# Patient Record
Sex: MALE | Race: WHITE | Employment: OTHER | URBAN - METROPOLITAN AREA
[De-identification: names, ages, dates, MRNs, and addresses within clinical notes are randomized per-mention and may not be internally consistent; named-entity substitution may affect disease eponyms.]

---

## 2019-04-19 ENCOUNTER — HOSPITAL ENCOUNTER (EMERGENCY)
Age: 79
Discharge: HOME | End: 2019-04-19
Attending: EMERGENCY MEDICINE
Payer: MEDICARE

## 2019-04-19 ENCOUNTER — APPOINTMENT (OUTPATIENT)
Dept: CT IMAGING | Age: 79
End: 2019-04-19
Attending: EMERGENCY MEDICINE
Payer: MEDICARE

## 2019-04-19 VITALS
HEART RATE: 80 BPM | RESPIRATION RATE: 18 BRPM | WEIGHT: 145 LBS | DIASTOLIC BLOOD PRESSURE: 63 MMHG | HEIGHT: 66 IN | BODY MASS INDEX: 23.3 KG/M2 | OXYGEN SATURATION: 99 % | SYSTOLIC BLOOD PRESSURE: 126 MMHG | TEMPERATURE: 97.3 F

## 2019-04-19 DIAGNOSIS — M48.061 SPINAL STENOSIS OF LUMBAR REGION WITHOUT NEUROGENIC CLAUDICATION: Primary | ICD-10-CM

## 2019-04-19 DIAGNOSIS — M47.816 SPONDYLOSIS OF LUMBAR REGION WITHOUT MYELOPATHY OR RADICULOPATHY: ICD-10-CM

## 2019-04-19 LAB
ANION GAP BLD CALC-SCNC: 17 MMOL/L (ref 10–20)
APPEARANCE UR: CLEAR
ATRIAL RATE: 52 BPM
BACTERIA URNS QL MICRO: NEGATIVE /HPF
BILIRUB UR QL: NEGATIVE
BUN BLD-MCNC: 18 MG/DL (ref 9–20)
CA-I BLD-MCNC: 1.18 MMOL/L (ref 1.12–1.32)
CALCULATED P AXIS, ECG09: 59 DEGREES
CALCULATED R AXIS, ECG10: 21 DEGREES
CALCULATED T AXIS, ECG11: 48 DEGREES
CHLORIDE BLD-SCNC: 101 MMOL/L (ref 98–107)
CO2 BLD-SCNC: 25 MMOL/L (ref 21–32)
COLOR UR: NORMAL
CREAT BLD-MCNC: 1 MG/DL (ref 0.6–1.3)
DIAGNOSIS, 93000: NORMAL
EPITH CASTS URNS QL MICRO: NORMAL /LPF
GLUCOSE BLD-MCNC: 167 MG/DL (ref 65–100)
GLUCOSE UR STRIP.AUTO-MCNC: NEGATIVE MG/DL
HCT VFR BLD CALC: 43 % (ref 36.6–50.3)
HGB UR QL STRIP: NEGATIVE
KETONES UR QL STRIP.AUTO: NEGATIVE MG/DL
LEUKOCYTE ESTERASE UR QL STRIP.AUTO: NEGATIVE
NITRITE UR QL STRIP.AUTO: NEGATIVE
P-R INTERVAL, ECG05: 182 MS
PH UR STRIP: 6 [PH] (ref 5–8)
POTASSIUM BLD-SCNC: 4 MMOL/L (ref 3.5–5.1)
PROT UR STRIP-MCNC: NEGATIVE MG/DL
Q-T INTERVAL, ECG07: 422 MS
QRS DURATION, ECG06: 102 MS
QTC CALCULATION (BEZET), ECG08: 392 MS
RBC #/AREA URNS HPF: NORMAL /HPF (ref 0–5)
SERVICE CMNT-IMP: ABNORMAL
SODIUM BLD-SCNC: 138 MMOL/L (ref 136–145)
SP GR UR REFRACTOMETRY: 1.02 (ref 1–1.03)
UR CULT HOLD, URHOLD: NORMAL
UROBILINOGEN UR QL STRIP.AUTO: 1 EU/DL (ref 0.2–1)
VENTRICULAR RATE, ECG03: 52 BPM
WBC URNS QL MICRO: NORMAL /HPF (ref 0–4)

## 2019-04-19 PROCEDURE — 74011250636 HC RX REV CODE- 250/636: Performed by: EMERGENCY MEDICINE

## 2019-04-19 PROCEDURE — 96372 THER/PROPH/DIAG INJ SC/IM: CPT

## 2019-04-19 PROCEDURE — 81001 URINALYSIS AUTO W/SCOPE: CPT

## 2019-04-19 PROCEDURE — 74011250637 HC RX REV CODE- 250/637: Performed by: EMERGENCY MEDICINE

## 2019-04-19 PROCEDURE — 80047 BASIC METABLC PNL IONIZED CA: CPT

## 2019-04-19 PROCEDURE — 99284 EMERGENCY DEPT VISIT MOD MDM: CPT

## 2019-04-19 PROCEDURE — 72131 CT LUMBAR SPINE W/O DYE: CPT

## 2019-04-19 PROCEDURE — 51798 US URINE CAPACITY MEASURE: CPT

## 2019-04-19 PROCEDURE — 93005 ELECTROCARDIOGRAM TRACING: CPT

## 2019-04-19 RX ORDER — HYDROCODONE BITARTRATE AND ACETAMINOPHEN 5; 325 MG/1; MG/1
1 TABLET ORAL
Status: COMPLETED | OUTPATIENT
Start: 2019-04-19 | End: 2019-04-19

## 2019-04-19 RX ORDER — DEXAMETHASONE SODIUM PHOSPHATE 4 MG/ML
10 INJECTION, SOLUTION INTRA-ARTICULAR; INTRALESIONAL; INTRAMUSCULAR; INTRAVENOUS; SOFT TISSUE
Status: COMPLETED | OUTPATIENT
Start: 2019-04-19 | End: 2019-04-19

## 2019-04-19 RX ORDER — METHYLPREDNISOLONE 4 MG/1
TABLET ORAL
Qty: 1 DOSE PACK | Refills: 0 | Status: SHIPPED | OUTPATIENT
Start: 2019-04-19 | End: 2019-10-08

## 2019-04-19 RX ORDER — HYDROCODONE BITARTRATE AND ACETAMINOPHEN 5; 325 MG/1; MG/1
1 TABLET ORAL
Qty: 6 TAB | Refills: 0 | Status: SHIPPED | OUTPATIENT
Start: 2019-04-19 | End: 2019-04-22

## 2019-04-19 RX ORDER — NALOXONE HYDROCHLORIDE 4 MG/.1ML
SPRAY NASAL
Qty: 2 EACH | Refills: 0 | Status: SHIPPED | OUTPATIENT
Start: 2019-04-19 | End: 2019-10-08

## 2019-04-19 RX ADMIN — HYDROCODONE BITARTRATE AND ACETAMINOPHEN 1 TABLET: 5; 325 TABLET ORAL at 07:47

## 2019-04-19 RX ADMIN — DEXAMETHASONE SODIUM PHOSPHATE 10 MG: 4 INJECTION, SOLUTION INTRAMUSCULAR; INTRAVENOUS at 07:46

## 2019-04-19 NOTE — ED PROVIDER NOTES
69 y.o. male with past medical history significant for GERD, TIA, Anxiety, and Hypertension who presents from home with chief complaint of back pain. Patient states onset 3-4 days ago of bilateral lower back pain (R>L) that is constant in nature. Patient prior to arrival the back pain was so intense that he became lightheaded and \"almost passed out. \" Patient presents to [facility] ED today with persisting lower back pain (R>L) rated as 10/10 in severity. Patient reports aggravation of back pain with positional movement and palpation. Patient reports accompanying increased urinary frequency recently. Patient endorses taking 5 mg Hydrocodone for back pain with no relief, last dose 0300. Patient reports history of lower back pain which required cortisone shots done by Dr. Russ Miller; however, patient states back pain presented today is different, noting the pain is more severe and \"higher and more to the right. \" Patient states he \"thinks\" he has history of spinal stenosis. Patient denies incontinence. Patient denies recent GLF, injury, or increased daily exercise. Patient denies history of diabetes. Pt denies fever, chills, cough, congestion, shortness of breath, chest pain, abdominal pain, nausea, vomiting, diarrhea, difficulty with urination or dysuria. There are no other acute medical concerns at this time. PCP: Edgar Sauceda MD 
 
Note written by Azam Nicole, as dictated by Abiola Romero MD 7:18 AM 
 
 
The history is provided by the patient and the spouse. Past Medical History:  
Diagnosis Date  Anxiety  GERD (gastroesophageal reflux disease)  HTN (hypertension), benign  TIA (transient ischemic attack) 2000 Past Surgical History:  
Procedure Laterality Date [address]  HX CORONARY ARTERY BYPASS GRAFT Family History:  
Problem Relation Age of Onset  Heart Disease Neg Hx  Stroke Neg Hx Social History Socioeconomic History  Marital status:  Spouse name: Not on file  Number of children: Not on file  Years of education: Not on file  Highest education level: Not on file Occupational History  Not on file Social Needs  Financial resource strain: Not on file  Food insecurity:  
  Worry: Not on file Inability: Not on file  Transportation needs:  
  Medical: Not on file Non-medical: Not on file Tobacco Use  Smoking status: Former Smoker Substance and Sexual Activity  Alcohol use: No  
 Drug use: No  
 Sexual activity: Not on file Lifestyle  Physical activity:  
  Days per week: Not on file Minutes per session: Not on file  Stress: Not on file Relationships  Social connections:  
  Talks on phone: Not on file Gets together: Not on file Attends [employer] service: Not on file Active member of club or organization: Not on file Attends meetings of clubs or organizations: Not on file Relationship status: Not on file  Intimate partner violence:  
  Fear of current or ex partner: Not on file Emotionally abused: Not on file Physically abused: Not on file Forced sexual activity: Not on file Other Topics Concern  Not on file Social History Narrative  Not on file ALLERGIES: Iodinated contrast- oral and iv dye Review of Systems Constitutional: Negative for chills and fever. HENT: Negative for congestion. Respiratory: Negative for cough and shortness of breath. Cardiovascular: Negative for chest pain. Gastrointestinal: Negative for abdominal pain, diarrhea, nausea and vomiting. Genitourinary: Positive for frequency. Negative for difficulty urinating and dysuria. Musculoskeletal: Positive for back pain and gait problem. Neurological: Positive for light-headedness. All other systems reviewed and are negative. Vitals:  
 04/19/19 6252 BP: 126/63 Pulse: 80 Resp: 18  
 Temp: 97.3 °F (36.3 °C) SpO2: 99% Weight: 65.8 kg (145 lb) Height: 5' 6\" (1.676 m) Physical Exam  
Constitutional: He is oriented to person, place, and time. He appears well-developed and well-nourished. HENT:  
Head: Normocephalic and atraumatic. Eyes: Conjunctivae are normal.  
Neck: Normal range of motion. Cardiovascular: Normal rate, regular rhythm, normal heart sounds and intact distal pulses. Pulmonary/Chest: Effort normal and breath sounds normal. No stridor. No respiratory distress. He has no wheezes. He has no rales. Abdominal: Soft. Bowel sounds are normal. He exhibits no distension and no mass. There is no tenderness. There is no rebound and no guarding. Musculoskeletal: Normal range of motion. + midline L spine ttp, no t spine ttp or step off; + ttp right SI joint; 5/5 strength b/l LE; negative slr bilaterally Neurological: He is alert and oriented to person, place, and time. Skin: Skin is warm and dry. Nursing note and vitals reviewed. MDM Number of Diagnoses or Management Options Spinal stenosis of lumbar region without neurogenic claudication:  
Spondylosis of lumbar region without myelopathy or radiculopathy:  
Diagnosis management comments: Sx sound like djd but give age eval for compression fx Doubt urinary in nature but will check urine Try steroids No sx AAA Amount and/or Complexity of Data Reviewed Clinical lab tests: ordered and reviewed Tests in the radiology section of CPT®: ordered and reviewed Obtain history from someone other than the patient: yes (wife) Review and summarize past medical records: yes Patient Progress Patient progress: stable Procedures PROGRESS NOTE: 
8:39 AM Patient road tested and is ambulating ok, but feels lightheaded like he could \"black out\", and is unsure if this is caused by pain. Will check EKG, hemoglobin, and electrolytes. ED EKG interpretation: Rhythm: sinus bradycardia; and regular . Rate (approx.): 50; Axis: normal; P wave: normal; QRS interval: normal ; ST/T wave: non-specific changes EKG documented by Lynda Key MD, scribe, as interpreted by Eduard Michelle MD, ED MD. 
 
PROGRESS NOTE: 
9:06 AM Patient states his back feels better and is no longer feeling dizzy after sitting in the bed. Labs and EKG normal, suspect dizziness was from pain. Provider discussed risks of narcotics and GLF with patient and wife, and reviewed signs of spinal cord compression as reason to return, will follow up with Dr. Dandre Cassidy outpatient. Ambulated with walker with no difficulty PROGRESS NOTE: 
9:11 AM Patient states he cannot stop taking his prescribed Xanax (3x daily). Provider told the patient to not take the pain medication with Xanax due to suppression and drive to breath and is not recommended. Provider suggested Tylenol during the day and taking pain medication at night if he gets sore after ambulating. Provider reviewed how to use Narcan with wife.

## 2019-04-19 NOTE — DISCHARGE INSTRUCTIONS
Patient Education        Lumbar Spinal Stenosis: Care Instructions  Your Care Instructions    Stenosis in the spine is a narrowing of the canal that is around the spinal cord and nerve roots in your back. It can happen as part of aging. Sometimes bone and other tissue grow into this canal and press on the nerves that branch out from the spinal cord. This can cause pain, numbness, and weakness. When it happens in the lower part of your back, it is called lumbar spinal stenosis. It can cause problems in the legs, feet, and rear end (buttocks). You may be able to get relief from the symptoms of spinal stenosis by taking pain medicine. Your doctor may suggest physical therapy and exercises to keep your spine strong and flexible. Some people try steroid shots to reduce swelling. If pain and numbness in your legs are still so bad that you cannot do your normal activities, you may need surgery. Follow-up care is a key part of your treatment and safety. Be sure to make and go to all appointments, and call your doctor if you are having problems. It's also a good idea to know your test results and keep a list of the medicines you take. How can you care for yourself at home? · Take an over-the-counter pain medicine. Nonsteroidal anti-inflammatory drugs (NSAIDs) such as ibuprofen or naproxen seem to work best. But if you can't take NSAIDs, you can try acetaminophen. Be safe with medicines. Read and follow all instructions on the label. · Do not take two or more pain medicines at the same time unless the doctor told you to. Many pain medicines have acetaminophen, which is Tylenol. Too much acetaminophen (Tylenol) can be harmful. · Stay at a healthy weight. Being overweight puts extra strain on your spine. · Change positions often when you sit or stand. This can ease pain. It may also reduce pressure on the spinal cord and its nerves. · Avoid doing things that make your symptoms worse.  Walking downhill and standing for a long time may cause pain. · Stretch and strengthen your back muscles as your doctor or physical therapist recommends. If your doctor says it is okay to do them, these exercises may help. ? Lie on your back with your knees bent. Gently pull one bent knee to your chest. Put that foot back on the floor, and then pull the other knee to your chest.  ? Do pelvic tilts. Lie on your back with your knees bent. Tighten your stomach muscles. Pull your belly button (navel) in and up toward your ribs. You should feel like your back is pressing to the floor and your hips and pelvis are slightly lifting off the floor. Hold for 6 seconds while breathing smoothly. ? Stand with your back flat against a wall. Slowly slide down until your knees are slightly bent. Hold for 10 seconds, then slide back up the wall. · Remove or change anything in your house that may cause you to fall. Keep walkways clear of clutter, electrical cords, and throw rugs. When should you call for help? Call 911 anytime you think you may need emergency care. For example, call if:    · You are unable to move a leg at all.   [facility] your doctor now or seek immediate medical care if:    · You have new or worse symptoms in your legs, belly, or buttocks. Symptoms may include:  ? Numbness or tingling. ? Weakness. ? Pain.     · You lose bladder or bowel control.    Watch closely for changes in your health, and be sure to contact your doctor if:    · You have a fever, lose weight, or don't feel well.     · You are not getting better as expected. Where can you learn more? Go to http://ana-rayne.info/. Lali Chávez in the search box to learn more about \"Lumbar Spinal Stenosis: Care Instructions. \"  Current as of: September 20, 2018  Content Version: 11.9  © 7252-9045 [employer], [employer]. Care instructions adapted under license by [employer] (which disclaims liability or warranty for this information).  If you have questions about a medical condition or this instruction, always ask your healthcare professional. [place] [address] any warranty or liability for your use of this information.

## 2019-04-19 NOTE — ED TRIAGE NOTES
Pt to room for triage, pt unable to get out car, assisted to wheelchair and escorted inside. Pt c/o R sided lower back pain that is radiating up spine, pain ongoing for 3-4 days. States pain is worse laying down night. Denies recent falls/ injurys, CP or SOB. Also c/o n and lightheadedness

## 2019-10-01 ENCOUNTER — HOSPITAL ENCOUNTER (OUTPATIENT)
Dept: MRI IMAGING | Age: 79
Discharge: HOME | End: 2019-10-01
Attending: ORTHOPAEDIC SURGERY
Payer: MEDICARE

## 2019-10-01 DIAGNOSIS — M75.41 IMPINGEMENT SYNDROME OF RIGHT SHOULDER: ICD-10-CM

## 2019-10-01 PROCEDURE — 73221 MRI JOINT UPR EXTREM W/O DYE: CPT

## 2019-10-08 ENCOUNTER — HOSPITAL ENCOUNTER (OUTPATIENT)
Dept: PREADMISSION TESTING | Age: 79
Discharge: HOME | End: 2019-10-08
Payer: MEDICARE

## 2019-10-08 VITALS
DIASTOLIC BLOOD PRESSURE: 72 MMHG | WEIGHT: 150 LBS | HEART RATE: 54 BPM | BODY MASS INDEX: 23.54 KG/M2 | HEIGHT: 67 IN | TEMPERATURE: 97.7 F | SYSTOLIC BLOOD PRESSURE: 125 MMHG

## 2019-10-08 LAB
ATRIAL RATE: 51 BPM
CALCULATED P AXIS, ECG09: 73 DEGREES
CALCULATED R AXIS, ECG10: 18 DEGREES
CALCULATED T AXIS, ECG11: 30 DEGREES
DIAGNOSIS, 93000: NORMAL
HGB BLD-MCNC: 14.8 G/DL (ref 12.1–17)
P-R INTERVAL, ECG05: 176 MS
Q-T INTERVAL, ECG07: 432 MS
QRS DURATION, ECG06: 90 MS
QTC CALCULATION (BEZET), ECG08: 398 MS
VENTRICULAR RATE, ECG03: 51 BPM

## 2019-10-08 PROCEDURE — 36415 COLL VENOUS BLD VENIPUNCTURE: CPT

## 2019-10-08 PROCEDURE — 93005 ELECTROCARDIOGRAM TRACING: CPT

## 2019-10-08 PROCEDURE — 85018 HEMOGLOBIN: CPT

## 2019-10-08 RX ORDER — TAMSULOSIN HYDROCHLORIDE 0.4 MG/1
0.4 CAPSULE ORAL
COMMUNITY

## 2019-10-08 RX ORDER — OMEPRAZOLE 40 MG/1
40 CAPSULE, DELAYED RELEASE ORAL
COMMUNITY

## 2019-10-08 RX ORDER — AMLODIPINE BESYLATE 2.5 MG/1
2.5 TABLET ORAL 2 TIMES DAILY
Status: ON HOLD | COMMUNITY
End: 2022-09-04 | Stop reason: SDUPTHER

## 2019-10-08 RX ORDER — TESTOSTERONE 25 MG/2.5G
25 GEL TRANSDERMAL
Status: ON HOLD | COMMUNITY
End: 2020-01-21

## 2019-10-08 RX ORDER — TRAZODONE HYDROCHLORIDE 100 MG/1
50 TABLET ORAL
COMMUNITY
End: 2020-08-05

## 2019-10-11 NOTE — H&P
Office Visit     10/3/2019  [facility]   Traci Padron MD   Orthopedic Surgery   Nontraumatic incomplete tear of right rotator cuff +2 more   Dx   Right Shoulder - Follow-up   Reason for Visit    Progress notes        PCP: Marcos Huff DO      Problem List      None        Subjective: There is no problem list on file for this patient.     Chief Complaint: Follow-up of the Right Shoulder     HPI: Hollie Cardenas is a 67 y.o. male who returns for follow-up of his right shoulder pain. During his last visit with our office on 9/26/19, he reported continued right shoulder pain. He had dramatic weakness with abduction on strength testing and significant pain. My suspicion was high for a rotator cuff tear. We ordered an MRI of his right shoulder to evaluate for a full-thickness rotator cuff tear. The MRI ordered of his right shoulder showed AC joint degenerative changes with subacromial spurring and a moderate sized high grade partial thickness tear of the supraspinatus. He returns today to discuss the results. He reports his right shoulder pain has increased since his last visit. He notes he is unable to sleep due to the pain. He states his right shoulder ROM is limited due to the pain. He reports trying tylenol for the night pain with very mild relief.       Objective:      There were no vitals filed for this visit. Wt Readings from Last 3 Encounters:   09/26/19 145 lb   05/07/19 153 lb   04/22/19 155 lb      There is no height or weight on file to calculate BMI.     Musculoskeletal : Right Shoulder: He has good range of motion of the neck. No obvious atrophy.  He has dramatically decreased abduction with with significant pain on abduction.  He has dramatic weakness with abduction on strength testing.  Normal IR and ER of the right shoulder with pain on motion.  Negative Spurling sign.  Skin healthy and in tact.  Neurovascularly in tact.            Radiographs:             Mri Shoulder Right Without Contrast ([id number])     Result Date: 10/1/2019  [facility] - [facility] - MRI SHOULDER RT WO CONT Patient: Blaine Hopkins : 1940 Date of Service: 10/1/2019 1:08:26 PM Reason For Exam: Impingement syndrome of right shoulder Ordering Provider: Niharika Juan Physician: Reyes Mobley Signing Date: 10/1/2019 3:21:45 PM EXAM: MRI SHOULDER RT WO CONT INDICATION: Right shoulder pain, impingement COMPARISON: None TECHNIQUE: Axial proton density fat-saturated; oblique coronal T1, T2 fat-saturated, and proton density fat-saturated; and oblique sagittal T2 fat-saturated MRI of the right shoulder . CONTRAST: None. FINDINGS: A.C. joint: There is mild to moderate degeneration of the acromioclavicular joint with subacromial spurring Anterior acromion process type: 2 Bone marrow: Within normal limits. No acute fracture, dislocation, or marrow replacing process. Joint fluid: There is fluid in the subacromial subdeltoid bursa and a small glenohumeral effusion Rotator cuff tendons: There is a moderate-sized high-grade partial-thickness bursal surface tear of the mid and anterior supraspinatus. There is tendinopathy of the remaining supraspinatus. Remaining cuff tendons are intact Biceps tendon: Intact and located within the bicipital groove. Muscles: No edema or atrophy. Glenoid labrum: Intact. Glenohumeral joint capsule: within normal limits. Glenohumeral articular cartilage: Intact. No focal osteochondral lesion. Soft tissue mass: None. IMPRESSION: 1. Moderate-sized high-grade partial-thickness bursal surface tear of the mid and anterior supraspinatus 2. Acromioclavicular degenerative change with subacromial spurring  Signing date/time: 10/1/2019  3:21 PM Signed by: Navin Elizabeth V        Assessment:      1. Nontraumatic incomplete tear of right rotator cuff    2. Impingement syndrome of right shoulder       Plan:      I reviewed the MRI ordered of the right shoulder with the patient.  He has AC joint degenerative changes with subacromial spurring and a moderate sized high grade partial thickness tear of the supraspinatus. I explained to the patient his tear is likely to become a full-thickness tear if left untreated. We discussed treatment options including conservative management vs rotator cuff repair surgery. The patient reports severe right shoulder pain that keeps him awake at night and limits his right shoulder ROM. I think it is reasonable to proceed with surgery. We discussed arthroscopic subacromial chondroplasty followed by mini open rotator cuff repair surgery at length with pt including expected outcomes and post-op recovery as well as risks and complications, specifically DVT, PE, infection, failure of repair, recurrent shoulder dislocations, and nerve injury. After much discussion, pt desires to proceed with surgery. We will schedule surgery at pt's convenience. Due to the severity of his pain today, we provided him with a sling shot shoulder immobilizer which he will wear PRN until surgery. He will bring this to surgery and use this sling post-operatively. Follow-up for scheduled surgery.     No orders of the defined types were placed in this encounter. Return for Scheduled surgery.      Medical documentation was entered by me, [address] [place] [address] for Dr. Poppy Ceballos. 10/3/2019     I, Audrey Paez MD, personally, performed the services described in this documentation, as scribed in my presence, and it is both accurate and complete. Electronically signed by Audrey Paez MD at 10/6/2019 11:47 AM    Revision History                                 Instructions         Return for Scheduled surgery.     After Visit Summary (Automatic SnapShot taken 10/6/2019)   Additional Documentation     SmartForms:     OV REVIEW OF SYSTEMS       Encounter Info:    Billing Info,    History,    Allergies,    Detailed Report       Communications         Letter sent to Tyson Sharma DO   Sent 10/6/2019 Communication Routing History     Recipient Method Sent by Date Sent   Yan Owens DO Fax Damian Petersen MD 10/6/2019   Fax: [phone]  Phone: [phone] Letter from Damian Petersen MD created on 10/6/2019  Reason: Letter to PCP    Recipient     Recipient Method Sent by Date Sent   Yan Owens DO Fax Damian Petersen MD 10/6/2019   Fax: [phone]  Phone: [phone]    Orders Placed      Sling with or without Abduction Pillow ([id number])   Daily Orders   Comment  Expand  Hide   (720h ago, onward)      Last edited by  on  at    Start   Ordered   10/03/19 0000  Sling with or without Abduction Pillow ([id number])      10/03/19 1520      Medication Changes           (Therapy completed)      Medication List    Visit Diagnoses         Nontraumatic incomplete tear of right rotator cuff      Impingement syndrome of right shoulder      Pain management      Problem List    Generated on 10/11/19  3:38 PM Page      Date of Surgery Update:  Rafa Kramer was seen and examined. Past Medical History:   Diagnosis Date    Anxiety     Arthritis     BPH (benign prostatic hyperplasia)     Chronic pain     GERD (gastroesophageal reflux disease)     HTN (hypertension), benign     Rotator cuff tear     RIGHT     TIA (transient ischemic attack) 2000     Prior to Admission Medications   Prescriptions Last Dose Informant Patient Reported? Taking? ALPRAZolam (XANAX) 0.25 mg tablet 10/15/2019 at 0500  Yes Yes   Sig: Take 0.25 mg by mouth two (2) times a day. amLODIPine (NORVASC) 5 mg tablet 10/15/2019 at 0500  Yes Yes   Sig: Take 5 mg by mouth daily (after breakfast). aspirin 81 mg chewable tablet 10/12/2019  Yes No   Sig: Take 81 mg by mouth daily. atorvastatin (LIPITOR) 40 mg tablet 10/14/2019 at Unknown time  Yes Yes   Sig: Take 40 mg by mouth nightly. naproxen sodium (ALEVE PO) 10/11/2019  Yes No   Sig: Take  by mouth two (2) times daily as needed.    omeprazole (PRILOSEC) 40 mg capsule 10/14/2019 at Unknown time  Yes Yes   Sig: Take 40 mg by mouth daily (after breakfast). tamsulosin (FLOMAX) 0.4 mg capsule 10/14/2019 at Unknown time  Yes Yes   Sig: Take 0.4 mg by mouth nightly. testosterone (ANDROGEL) 1 % (25 mg/2.5gram) glpk 10/5/2019  Yes No   Si mg by TransDERmal route. Indications: EVERY 10-15 DAYS   traZODone (DESYREL) 100 mg tablet 10/14/2019 at Unknown time  Yes Yes   Sig: Take 100 mg by mouth nightly. Facility-Administered Medications: None      Allergy to: Iodinated contrast media  Physical Examination: General appearance - alert, well appearing, and in no distress  Mental status - alert, oriented to person, place, and time  Chest - clear to auscultation, no wheezes, rales or rhonchi, symmetric air entry  Heart - normal rate and regular rhythm  Neurological - no focal findings or movement disorder noted  Extremities - intact peripheral pulses, painful/limited ROM right shoulder  Skin - normal coloration and turgor, no rashes, no suspicious skin lesions noted  History and physical has been reviewed. The patient has been examined.  There have been no significant clinical changes since the completion of the originally dated History and Physical.    Signed By: VALARIE Martinez     October 15, 2019 9:13 AM

## 2019-10-14 ENCOUNTER — ANESTHESIA EVENT (OUTPATIENT)
Dept: MEDSURG UNIT | Age: 79
End: 2019-10-14
Payer: MEDICARE

## 2019-10-15 ENCOUNTER — ANESTHESIA (OUTPATIENT)
Dept: MEDSURG UNIT | Age: 79
End: 2019-10-15
Payer: MEDICARE

## 2019-10-15 ENCOUNTER — HOSPITAL ENCOUNTER (OUTPATIENT)
Age: 79
Setting detail: OUTPATIENT SURGERY
Discharge: HOME | End: 2019-10-15
Attending: ORTHOPAEDIC SURGERY | Admitting: ORTHOPAEDIC SURGERY
Payer: MEDICARE

## 2019-10-15 VITALS
OXYGEN SATURATION: 95 % | DIASTOLIC BLOOD PRESSURE: 45 MMHG | SYSTOLIC BLOOD PRESSURE: 104 MMHG | TEMPERATURE: 97.5 F | HEART RATE: 66 BPM | RESPIRATION RATE: 16 BRPM

## 2019-10-15 DIAGNOSIS — M75.121 NONTRAUMATIC COMPLETE TEAR OF RIGHT ROTATOR CUFF: Primary | ICD-10-CM

## 2019-10-15 PROCEDURE — 74011250637 HC RX REV CODE- 250/637: Performed by: ANESTHESIOLOGY

## 2019-10-15 PROCEDURE — 77030016678 HC BUR SHV4 S&N -B: Performed by: ORTHOPAEDIC SURGERY

## 2019-10-15 PROCEDURE — 77030006884 HC BLD SHV INCIS S&N -B: Performed by: ORTHOPAEDIC SURGERY

## 2019-10-15 PROCEDURE — 74011000250 HC RX REV CODE- 250: Performed by: ANESTHESIOLOGY

## 2019-10-15 PROCEDURE — 74011250636 HC RX REV CODE- 250/636: Performed by: NURSE ANESTHETIST, CERTIFIED REGISTERED

## 2019-10-15 PROCEDURE — 77030026438 HC STYL ET INTUB CARD -A: Performed by: NURSE ANESTHETIST, CERTIFIED REGISTERED

## 2019-10-15 PROCEDURE — 74011000250 HC RX REV CODE- 250: Performed by: NURSE ANESTHETIST, CERTIFIED REGISTERED

## 2019-10-15 PROCEDURE — 77030002933 HC SUT MCRYL J&J -A: Performed by: ORTHOPAEDIC SURGERY

## 2019-10-15 PROCEDURE — 77030040361 HC SLV COMPR DVT MDII -B: Performed by: ORTHOPAEDIC SURGERY

## 2019-10-15 PROCEDURE — 77030003601 HC NDL NRV BLK BBMI -A

## 2019-10-15 PROCEDURE — 74011250636 HC RX REV CODE- 250/636: Performed by: ORTHOPAEDIC SURGERY

## 2019-10-15 PROCEDURE — 77030008684 HC TU ET CUF COVD -B: Performed by: NURSE ANESTHETIST, CERTIFIED REGISTERED

## 2019-10-15 PROCEDURE — 77030039266 HC ADH SKN EXOFIN S2SG -A: Performed by: ORTHOPAEDIC SURGERY

## 2019-10-15 PROCEDURE — 76030000003 HC AMB SURG OR TIME 1.5 TO 2: Performed by: ORTHOPAEDIC SURGERY

## 2019-10-15 PROCEDURE — 77030031139 HC SUT VCRL2 J&J -A: Performed by: ORTHOPAEDIC SURGERY

## 2019-10-15 PROCEDURE — C1713 ANCHOR/SCREW BN/BN,TIS/BN: HCPCS | Performed by: ORTHOPAEDIC SURGERY

## 2019-10-15 PROCEDURE — 77030020346 HC PRB ARTHSC CHSL S&N -C: Performed by: ORTHOPAEDIC SURGERY

## 2019-10-15 PROCEDURE — 76060000063 HC AMB SURG ANES 1.5 TO 2 HR: Performed by: ORTHOPAEDIC SURGERY

## 2019-10-15 PROCEDURE — 76210000037 HC AMBSU PH I REC 2 TO 2.5 HR: Performed by: ORTHOPAEDIC SURGERY

## 2019-10-15 PROCEDURE — 76210000046 HC AMBSU PH II REC FIRST 0.5 HR: Performed by: ORTHOPAEDIC SURGERY

## 2019-10-15 PROCEDURE — 74011250636 HC RX REV CODE- 250/636: Performed by: ANESTHESIOLOGY

## 2019-10-15 PROCEDURE — 77030008753 HC TU IRR IN/OUT FLO S&N -B: Performed by: ORTHOPAEDIC SURGERY

## 2019-10-15 PROCEDURE — 77030002919 HC SUT FBRTAPE ARTH -B: Performed by: ORTHOPAEDIC SURGERY

## 2019-10-15 PROCEDURE — 77030002916 HC SUT ETHLN J&J -A: Performed by: ORTHOPAEDIC SURGERY

## 2019-10-15 PROCEDURE — 77030040922 HC BLNKT HYPOTHRM STRY -A

## 2019-10-15 PROCEDURE — 77030018835 HC SOL IRR LR ICUM -A: Performed by: ORTHOPAEDIC SURGERY

## 2019-10-15 DEVICE — ANCHOR SUTURE BIOCOMP 4.75X19.1 MM SWIVELOCK C: Type: IMPLANTABLE DEVICE | Site: SHOULDER | Status: FUNCTIONAL

## 2019-10-15 RX ORDER — PROPOFOL 10 MG/ML
INJECTION, EMULSION INTRAVENOUS AS NEEDED
Status: DISCONTINUED | OUTPATIENT
Start: 2019-10-15 | End: 2019-10-15 | Stop reason: HOSPADM

## 2019-10-15 RX ORDER — CEFAZOLIN SODIUM 1 G/3ML
INJECTION, POWDER, FOR SOLUTION INTRAMUSCULAR; INTRAVENOUS AS NEEDED
Status: DISCONTINUED | OUTPATIENT
Start: 2019-10-15 | End: 2019-10-15 | Stop reason: HOSPADM

## 2019-10-15 RX ORDER — ROPIVACAINE HYDROCHLORIDE 5 MG/ML
30 INJECTION, SOLUTION EPIDURAL; INFILTRATION; PERINEURAL AS NEEDED
Status: DISCONTINUED | OUTPATIENT
Start: 2019-10-15 | End: 2019-10-15 | Stop reason: HOSPADM

## 2019-10-15 RX ORDER — MORPHINE SULFATE 10 MG/ML
2 INJECTION, SOLUTION INTRAMUSCULAR; INTRAVENOUS
Status: DISCONTINUED | OUTPATIENT
Start: 2019-10-15 | End: 2019-10-15 | Stop reason: HOSPADM

## 2019-10-15 RX ORDER — ONDANSETRON 2 MG/ML
4 INJECTION INTRAMUSCULAR; INTRAVENOUS AS NEEDED
Status: DISCONTINUED | OUTPATIENT
Start: 2019-10-15 | End: 2019-10-15 | Stop reason: HOSPADM

## 2019-10-15 RX ORDER — NEOSTIGMINE METHYLSULFATE 1 MG/ML
INJECTION INTRAVENOUS AS NEEDED
Status: DISCONTINUED | OUTPATIENT
Start: 2019-10-15 | End: 2019-10-15 | Stop reason: HOSPADM

## 2019-10-15 RX ORDER — SODIUM CHLORIDE, SODIUM LACTATE, POTASSIUM CHLORIDE, CALCIUM CHLORIDE 600; 310; 30; 20 MG/100ML; MG/100ML; MG/100ML; MG/100ML
1000 INJECTION, SOLUTION INTRAVENOUS CONTINUOUS
Status: DISCONTINUED | OUTPATIENT
Start: 2019-10-15 | End: 2019-10-15 | Stop reason: HOSPADM

## 2019-10-15 RX ORDER — FENTANYL CITRATE 50 UG/ML
50 INJECTION, SOLUTION INTRAMUSCULAR; INTRAVENOUS AS NEEDED
Status: DISCONTINUED | OUTPATIENT
Start: 2019-10-15 | End: 2019-10-15 | Stop reason: HOSPADM

## 2019-10-15 RX ORDER — GLYCOPYRROLATE 0.2 MG/ML
INJECTION INTRAMUSCULAR; INTRAVENOUS AS NEEDED
Status: DISCONTINUED | OUTPATIENT
Start: 2019-10-15 | End: 2019-10-15 | Stop reason: HOSPADM

## 2019-10-15 RX ORDER — ONDANSETRON 2 MG/ML
INJECTION INTRAMUSCULAR; INTRAVENOUS AS NEEDED
Status: DISCONTINUED | OUTPATIENT
Start: 2019-10-15 | End: 2019-10-15 | Stop reason: HOSPADM

## 2019-10-15 RX ORDER — PHENYLEPHRINE HCL IN 0.9% NACL 0.4MG/10ML
SYRINGE (ML) INTRAVENOUS AS NEEDED
Status: DISCONTINUED | OUTPATIENT
Start: 2019-10-15 | End: 2019-10-15 | Stop reason: HOSPADM

## 2019-10-15 RX ORDER — LIDOCAINE HYDROCHLORIDE 10 MG/ML
0.1 INJECTION, SOLUTION EPIDURAL; INFILTRATION; INTRACAUDAL; PERINEURAL AS NEEDED
Status: DISCONTINUED | OUTPATIENT
Start: 2019-10-15 | End: 2019-10-15 | Stop reason: HOSPADM

## 2019-10-15 RX ORDER — SUCCINYLCHOLINE CHLORIDE 20 MG/ML
INJECTION INTRAMUSCULAR; INTRAVENOUS AS NEEDED
Status: DISCONTINUED | OUTPATIENT
Start: 2019-10-15 | End: 2019-10-15 | Stop reason: HOSPADM

## 2019-10-15 RX ORDER — SODIUM CHLORIDE 9 MG/ML
25 INJECTION, SOLUTION INTRAVENOUS CONTINUOUS
Status: DISCONTINUED | OUTPATIENT
Start: 2019-10-15 | End: 2019-10-15 | Stop reason: HOSPADM

## 2019-10-15 RX ORDER — EPHEDRINE SULFATE/0.9% NACL/PF 50 MG/5 ML
SYRINGE (ML) INTRAVENOUS AS NEEDED
Status: DISCONTINUED | OUTPATIENT
Start: 2019-10-15 | End: 2019-10-15 | Stop reason: HOSPADM

## 2019-10-15 RX ORDER — MIDAZOLAM HYDROCHLORIDE 1 MG/ML
0.5 INJECTION, SOLUTION INTRAMUSCULAR; INTRAVENOUS
Status: DISCONTINUED | OUTPATIENT
Start: 2019-10-15 | End: 2019-10-15 | Stop reason: HOSPADM

## 2019-10-15 RX ORDER — DIPHENHYDRAMINE HYDROCHLORIDE 50 MG/ML
12.5 INJECTION, SOLUTION INTRAMUSCULAR; INTRAVENOUS AS NEEDED
Status: DISCONTINUED | OUTPATIENT
Start: 2019-10-15 | End: 2019-10-15 | Stop reason: HOSPADM

## 2019-10-15 RX ORDER — FENTANYL CITRATE 50 UG/ML
INJECTION, SOLUTION INTRAMUSCULAR; INTRAVENOUS AS NEEDED
Status: DISCONTINUED | OUTPATIENT
Start: 2019-10-15 | End: 2019-10-15 | Stop reason: HOSPADM

## 2019-10-15 RX ORDER — HYDROCODONE BITARTRATE AND ACETAMINOPHEN 5; 325 MG/1; MG/1
1 TABLET ORAL AS NEEDED
Status: DISCONTINUED | OUTPATIENT
Start: 2019-10-15 | End: 2019-10-15 | Stop reason: HOSPADM

## 2019-10-15 RX ORDER — HYDROMORPHONE HYDROCHLORIDE 1 MG/ML
0.2 INJECTION, SOLUTION INTRAMUSCULAR; INTRAVENOUS; SUBCUTANEOUS
Status: DISCONTINUED | OUTPATIENT
Start: 2019-10-15 | End: 2019-10-15 | Stop reason: HOSPADM

## 2019-10-15 RX ORDER — FENTANYL CITRATE 50 UG/ML
25 INJECTION, SOLUTION INTRAMUSCULAR; INTRAVENOUS
Status: DISCONTINUED | OUTPATIENT
Start: 2019-10-15 | End: 2019-10-15 | Stop reason: HOSPADM

## 2019-10-15 RX ORDER — DEXAMETHASONE SODIUM PHOSPHATE 4 MG/ML
INJECTION, SOLUTION INTRA-ARTICULAR; INTRALESIONAL; INTRAMUSCULAR; INTRAVENOUS; SOFT TISSUE AS NEEDED
Status: DISCONTINUED | OUTPATIENT
Start: 2019-10-15 | End: 2019-10-15 | Stop reason: HOSPADM

## 2019-10-15 RX ORDER — ACETAMINOPHEN 325 MG/1
650 TABLET ORAL ONCE
Status: COMPLETED | OUTPATIENT
Start: 2019-10-15 | End: 2019-10-15

## 2019-10-15 RX ORDER — MIDAZOLAM HYDROCHLORIDE 1 MG/ML
1 INJECTION, SOLUTION INTRAMUSCULAR; INTRAVENOUS AS NEEDED
Status: COMPLETED | OUTPATIENT
Start: 2019-10-15 | End: 2019-10-15

## 2019-10-15 RX ORDER — ROPIVACAINE HYDROCHLORIDE 5 MG/ML
INJECTION, SOLUTION EPIDURAL; INFILTRATION; PERINEURAL
Status: COMPLETED | OUTPATIENT
Start: 2019-10-15 | End: 2019-10-15

## 2019-10-15 RX ORDER — LIDOCAINE HYDROCHLORIDE 20 MG/ML
INJECTION, SOLUTION EPIDURAL; INFILTRATION; INTRACAUDAL; PERINEURAL AS NEEDED
Status: DISCONTINUED | OUTPATIENT
Start: 2019-10-15 | End: 2019-10-15 | Stop reason: HOSPADM

## 2019-10-15 RX ORDER — ROCURONIUM BROMIDE 10 MG/ML
INJECTION, SOLUTION INTRAVENOUS AS NEEDED
Status: DISCONTINUED | OUTPATIENT
Start: 2019-10-15 | End: 2019-10-15 | Stop reason: HOSPADM

## 2019-10-15 RX ORDER — MIDAZOLAM HYDROCHLORIDE 1 MG/ML
1 INJECTION, SOLUTION INTRAMUSCULAR; INTRAVENOUS AS NEEDED
Status: DISCONTINUED | OUTPATIENT
Start: 2019-10-15 | End: 2019-10-15 | Stop reason: HOSPADM

## 2019-10-15 RX ORDER — GLYCOPYRROLATE 0.2 MG/ML
0.2 INJECTION INTRAMUSCULAR; INTRAVENOUS
Status: DISCONTINUED | OUTPATIENT
Start: 2019-10-15 | End: 2019-10-15 | Stop reason: HOSPADM

## 2019-10-15 RX ORDER — ALBUTEROL SULFATE 0.83 MG/ML
2.5 SOLUTION RESPIRATORY (INHALATION) AS NEEDED
Status: DISCONTINUED | OUTPATIENT
Start: 2019-10-15 | End: 2019-10-15 | Stop reason: HOSPADM

## 2019-10-15 RX ORDER — OXYCODONE AND ACETAMINOPHEN 5; 325 MG/1; MG/1
1 TABLET ORAL
Qty: 40 TAB | Refills: 0 | Status: SHIPPED | OUTPATIENT
Start: 2019-10-15 | End: 2019-10-29

## 2019-10-15 RX ADMIN — LIDOCAINE HYDROCHLORIDE 80 MG: 20 INJECTION, SOLUTION EPIDURAL; INFILTRATION; INTRACAUDAL; PERINEURAL at 09:44

## 2019-10-15 RX ADMIN — Medication 10 MG: at 10:37

## 2019-10-15 RX ADMIN — ROCURONIUM BROMIDE 20 MG: 10 SOLUTION INTRAVENOUS at 09:56

## 2019-10-15 RX ADMIN — MIDAZOLAM 1 MG: 1 INJECTION INTRAMUSCULAR; INTRAVENOUS at 08:51

## 2019-10-15 RX ADMIN — SODIUM CHLORIDE 2.5 MG: 9 INJECTION INTRAMUSCULAR; INTRAVENOUS; SUBCUTANEOUS at 12:29

## 2019-10-15 RX ADMIN — MIDAZOLAM 1 MG: 1 INJECTION INTRAMUSCULAR; INTRAVENOUS at 08:55

## 2019-10-15 RX ADMIN — Medication 120 MCG: at 09:51

## 2019-10-15 RX ADMIN — Medication 20 MG: at 09:54

## 2019-10-15 RX ADMIN — Medication 80 MCG: at 09:57

## 2019-10-15 RX ADMIN — GLYCOPYRROLATE 0.4 MG: 0.2 INJECTION INTRAMUSCULAR; INTRAVENOUS at 10:49

## 2019-10-15 RX ADMIN — ROPIVACAINE HYDROCHLORIDE 30 ML: 5 INJECTION, SOLUTION EPIDURAL; INFILTRATION; PERINEURAL at 08:57

## 2019-10-15 RX ADMIN — SUCCINYLCHOLINE CHLORIDE 120 MG: 20 INJECTION, SOLUTION INTRAMUSCULAR; INTRAVENOUS at 09:44

## 2019-10-15 RX ADMIN — FENTANYL CITRATE 50 MCG: 50 INJECTION INTRAMUSCULAR; INTRAVENOUS at 08:51

## 2019-10-15 RX ADMIN — FENTANYL CITRATE 25 MCG: 50 INJECTION INTRAMUSCULAR; INTRAVENOUS at 11:56

## 2019-10-15 RX ADMIN — DEXAMETHASONE SODIUM PHOSPHATE 4 MG: 4 INJECTION, SOLUTION INTRAMUSCULAR; INTRAVENOUS at 10:00

## 2019-10-15 RX ADMIN — CEFAZOLIN 2 G: 330 INJECTION, POWDER, FOR SOLUTION INTRAMUSCULAR; INTRAVENOUS at 09:53

## 2019-10-15 RX ADMIN — Medication 10 MG: at 09:57

## 2019-10-15 RX ADMIN — MIDAZOLAM 1 MG: 1 INJECTION INTRAMUSCULAR; INTRAVENOUS at 08:52

## 2019-10-15 RX ADMIN — SODIUM CHLORIDE, SODIUM LACTATE, POTASSIUM CHLORIDE, AND CALCIUM CHLORIDE 1000 ML: 600; 310; 30; 20 INJECTION, SOLUTION INTRAVENOUS at 08:35

## 2019-10-15 RX ADMIN — PROPOFOL 150 MG: 10 INJECTION, EMULSION INTRAVENOUS at 09:44

## 2019-10-15 RX ADMIN — Medication 80 MCG: at 09:52

## 2019-10-15 RX ADMIN — MIDAZOLAM 1 MG: 1 INJECTION INTRAMUSCULAR; INTRAVENOUS at 08:54

## 2019-10-15 RX ADMIN — FENTANYL CITRATE 50 MCG: 50 INJECTION, SOLUTION INTRAMUSCULAR; INTRAVENOUS at 09:44

## 2019-10-15 RX ADMIN — FENTANYL CITRATE 25 MCG: 50 INJECTION INTRAMUSCULAR; INTRAVENOUS at 12:11

## 2019-10-15 RX ADMIN — ONDANSETRON 4 MG: 2 INJECTION INTRAMUSCULAR; INTRAVENOUS at 11:58

## 2019-10-15 RX ADMIN — MIDAZOLAM 1 MG: 1 INJECTION INTRAMUSCULAR; INTRAVENOUS at 08:53

## 2019-10-15 RX ADMIN — NEOSTIGMINE METHYLSULFATE 2 MG: 1 INJECTION, SOLUTION INTRAVENOUS at 10:49

## 2019-10-15 RX ADMIN — ONDANSETRON HYDROCHLORIDE 4 MG: 2 INJECTION, SOLUTION INTRAVENOUS at 10:00

## 2019-10-15 RX ADMIN — ACETAMINOPHEN 650 MG: 325 TABLET, FILM COATED ORAL at 08:27

## 2019-10-15 RX ADMIN — ROCURONIUM BROMIDE 10 MG: 10 SOLUTION INTRAVENOUS at 09:44

## 2019-10-15 NOTE — ANESTHESIA PREPROCEDURE EVALUATION
Relevant Problems   No relevant active problems       Anesthetic History   No history of anesthetic complications            Review of Systems / Medical History  Patient summary reviewed, nursing notes reviewed and pertinent labs reviewed    Pulmonary  Within defined limits                 Neuro/Psych       CVA: no residual symptoms       Cardiovascular    Hypertension          CAD and CABG    Exercise tolerance: >4 METS     GI/Hepatic/Renal     GERD: well controlled           Endo/Other        Arthritis     Other Findings              Physical Exam    Airway  Mallampati: II  TM Distance: > 6 cm  Neck ROM: normal range of motion   Mouth opening: Normal     Cardiovascular  Regular rate and rhythm,  S1 and S2 normal,  no murmur, click, rub, or gallop             Dental    Dentition: Implants     Pulmonary  Breath sounds clear to auscultation               Abdominal  GI exam deferred       Other Findings            Anesthetic Plan    ASA: 3  Anesthesia type: general      Post-op pain plan if not by surgeon: peripheral nerve block single    Induction: Intravenous  Anesthetic plan and risks discussed with: Patient

## 2019-10-15 NOTE — ANESTHESIA POSTPROCEDURE EVALUATION
Post-Anesthesia Evaluation and Assessment    Patient: Viviana Casillas MRN: [id number]  SSN: [id number]    YOB: 1940  Age: 67 y.o. Sex: male      I have evaluated the patient and they are stable and ready for discharge from the PACU. Cardiovascular Function/Vital Signs  Visit Vitals  /53   Pulse 81   Temp 36.4 °C (97.5 °F)   Resp 20   SpO2 100%       Patient is status post General anesthesia for Procedure(s):  SHOULDER LEFT ARTHROSCOPYACROMIOPLASTY, MINI OPEN ROTATOR CUFF TEAR REPAIR (CHOICE). Nausea/Vomiting: None    Postoperative hydration reviewed and adequate. Pain:  Pain Scale 1: Numeric (0 - 10) (10/15/19 0758)  Pain Intensity 1: 3 (10/15/19 0758)   Managed    Neurological Status:   Neuro (WDL): Within Defined Limits (10/15/19 0806)   At baseline    Mental Status, Level of Consciousness: Alert and  oriented to person, place, and time    Pulmonary Status:   O2 Device: CO2 nasal cannula (10/15/19 1122)   Adequate oxygenation and airway patent    Complications related to anesthesia: None    Post-anesthesia assessment completed. No concerns    Signed By: Kan Valle MD     October 15, 2019              Procedure(s):  SHOULDER LEFT ARTHROSCOPYACROMIOPLASTY, MINI OPEN ROTATOR CUFF TEAR REPAIR (CHOICE). general    <BSHSIANPOST>    Vitals Value Taken Time   /53 10/15/2019 12:00 PM   Temp 36.4 °C (97.5 °F) 10/15/2019 11:22 AM   Pulse 70 10/15/2019 12:11 PM   Resp 12 10/15/2019 12:11 PM   SpO2 100 % 10/15/2019 12:11 PM   Vitals shown include unvalidated device data.

## 2019-10-15 NOTE — ROUTINE PROCESS
Patient: Dorine Essex MRN: [id number]  SSN: [id number] YOB: 1940  Age: 67 y.o. Sex: male Patient is status post Procedure(s): SHOULDER LEFT ARTHROSCOPYACROMIOPLASTY, MINI OPEN ROTATOR CUFF TEAR REPAIR (CHOICE). Surgeon(s) and Role: Vasiliy Ballesteros MD - Primary Local/Dose/Irrigation:  SEE MAR Peripheral IV 10/15/19 Left Hand (Active) Site Assessment Clean, dry, & intact 10/15/2019  8:39 AM  
Phlebitis Assessment 0 10/15/2019  8:39 AM  
Infiltration Assessment 0 10/15/2019  8:39 AM  
Dressing Status Clean, dry, & intact 10/15/2019  8:39 AM  
Dressing Type Tape;Transparent 10/15/2019  8:39 AM  
Hub Color/Line Status Blue; Infusing 10/15/2019  8:39 AM  
Alcohol Cap Used Yes 10/15/2019  8:39 AM  
        
Airway - Endotracheal Tube 10/15/19 Oral (Active) Dressing/Packing:  Wound Shoulder Right-Dressing Type: 4 x 4;ABD pad; Topical skin adhesive/glue;Special tape (comment); Other (Comment)(exofin, hypafix tape) (10/15/19 0900) Splint/Cast: Wound Shoulder Right-Splint Type/Material: Shoulder Immobilizer] Other:

## 2019-10-15 NOTE — OP NOTES
[address]  OPERATIVE REPORT    Name:  Jose Martínez  MR#:  [id number]  :  1940  ACCOUNT #:  [de-identified]  DATE OF SERVICE:  10/15/2019    PREOPERATIVE DIAGNOSIS:  Right shoulder rotator cuff tear. POSTOPERATIVE DIAGNOSIS:  Right shoulder rotator cuff tear. PROCEDURES PERFORMED:  Right shoulder arthroscopic acromioplasty, mini-open rotator cuff tear repair. SURGEON:  Vee Calderón MD    ASSISTANT:  VALARIE Hodges    ANESTHESIA:  Regional plus general.    COMPLICATIONS:  None. SPECIMENS REMOVED:  None. IMPLANTS:  Arthrex anchors x2. ESTIMATED BLOOD LOSS:  Minimal.    INDICATIONS:  This is a 78-year-old gentleman with chronic progressive severe right shoulder pain not responsive to conservative management with an MRI scan showing near complete tear of his supraspinatus with only approximately 10% remaining fibers attached, who was felt to be a candidate for surgical intervention due to the severity of his pain and the near complete tear. OPERATION:  The patient was seen in the preoperative area and underwent placement of regional anesthesia. He was taken to the operating room and underwent general inhalational anesthesia. He was placed in the lateral position with the right shoulder up. The right shoulder and arm were prepped and draped in the usual fashion. Ten pounds of longitudinal traction was applied. Posterior portal was established in the glenohumeral joint. The joint was thoroughly evaluated and the biceps tendon was intact. There was some mild fraying proximally but nothing warranting intervention. Labrum was intact. No significant chondromalacia was noted. Severe fraying on the undersurface of the rotator cuff was noted. I was unable to identify a full-thickness tear. I removed all portals and saline solution. Direct lateral portal was established in the subacromial space.   Severe wear on the undersurface of the acromion and coracoacromial ligament was noted. Essentially a full-thickness tear was noted of the attachment of the supraspinatus. The incisor blade was used to debride the avulsion site down to bleeding subchondral bone. There did appear to be a few fibers remaining attached and these were debrided. There was mild retraction. The electrocautery device was used to clear the soft tissue from the undersurface of the acromion and release the coracoacromial ligament. The helical cutter was used to proceed with a stepwise acromioplasty. After an adequate acromioplasty, I then removed all portals and saline solution. Lateral portal was extended for a mini-open approach. Deltoid fibers were split longitudinally. Self-retaining retractor was placed. As I said, there were a few remaining fibers and these were transected using a 15 blade. The rotator cuff tendon was easily mobilized out laterally. A medial anchor was placed with FiberWire tape and FiberWire suture attached. The FiberWire tape and sutures were passed in a sequential fashion through the medial edge of the supraspinatus tendon. FiberWire tape was used to advance the cuff out laterally to a lateral anchor with a nice solid repair. FiberWire sutures were then tied down thereby reestablishing the footprint. All remaining suture was removed. The wounds were extensively irrigated with saline solution. The deltoid fascia was repaired using 0 Vicryl in interrupted figure-of-eight fashion. Skin edges were approximated using 3-0 Monocryl in a running subcuticular fashion followed by Dermabond. Sterile dressing was applied. The patient was awakened, extubated, and transferred to the recovery room in stable condition. The physician assistant was critical throughout the procedure in assisting with retraction, manipulation of the arm, and suture management as well as wound closure.       Floyd Gallo MD      [id number]  D:  10/15/2019 11:01  T: 10/15/2019 15:32  JOB #:  [id number]

## 2019-10-15 NOTE — ANESTHESIA PROCEDURE NOTES
Peripheral Block    Start time: 10/15/2019 8:49 AM  End time: 10/15/2019 8:57 AM  Performed by: Shannan Anguiano MD  Authorized by: Shannan Anguiano MD       Pre-procedure: Indications: procedure for pain    Preanesthetic Checklist: patient identified, risks and benefits discussed, site marked, timeout performed, anesthesia consent given and patient being monitored      Block Type:   Block Type:   Interscalene  Laterality:  Right  Monitoring:  Heart rate, frequent vital sign checks, continuous pulse ox, oxygen, responsive to questions and standard ASA monitoring  Injection Technique:  Single shot  Procedures: nerve stimulator    Patient Position: supine  Location:  Interscalene  Needle Type:  Stimuplex  Needle Gauge:  22 G  Needle Localization:  Nerve stimulator    Assessment:  Number of attempts:  1  Injection Assessment:  Incremental injection every 5 mL, negative aspiration for CSF, no paresthesia, no intravascular symptoms and negative aspiration for blood  Patient tolerance:  Patient tolerated the procedure well with no immediate complications

## 2019-10-15 NOTE — BRIEF OP NOTE
BRIEF OPERATIVE NOTE    Date of Procedure: 10/15/2019   Preoperative Diagnosis: ROTATOR CUFF TEAR OF THE RIGHT SHOULDER  Postoperative Diagnosis: ROTATOR CUFF TEAR OF THE RIGHT SHOULDER    Procedure(s):  SHOULDER LEFT ARTHROSCOPYACROMIOPLASTY, MINI OPEN ROTATOR CUFF TEAR REPAIR (CHOICE)  Surgeon(s) and Role:     * Joe Lopez MD - Primary         Surgical Assistant: Carlene Juares, [facility]    Surgical Staff:  Circ-1: Dez Parada RN  Circ-2: Germania Caldwell RN  Physician Assistant: VALARIE Cortez  Scrub RN-1: [place], [place], RN  Event Time In Time Out   Incision Start 1010    Incision Close       Anesthesia: General   Estimated Blood Loss: minimal  Specimens: * No specimens in log *   Findings: full thickness tear supraspinatous with mild retraction  Complications: none  Implants:   Implant Name Type Inv.  Item Serial No.  Lot No. LRB No. Used Action   ANCHOR BIOCOMP 4.75X19.1MM -- [facility] C-VENT - SN/A [place] ANCHOR BIOCOMP 4.75X19.1MM -- [employer] C-VENT N/A [employer] [id number] Right 2 Implanted

## 2019-10-15 NOTE — DISCHARGE INSTRUCTIONS
[address]    Upper Extremity Surgery  Discharge Instructions  EARLENE Jimenez MD    Please take the time to review the following instructions before you leave the hospital/surgery center and use them as guidelines during your recovery from surgery. If you have any questions, you may contact my office at [phone]. Wound Care/Dressing Changes    You may change your dressing as needed beginning on the 3rd day after surgery. When the dressing is removed, you may wash the area, including the sutures or staples, with soap and water. If the wound is still draining at that point, you may cover the incision with gauze and an ACE wrap. It is not necessary to apply antibiotic ointment to the incision. Sutures or staples will be removed at Dr. Milo Feliz office during your post-operative visit. Showering/Bathing    You may shower only. Your dressing may be removed for showering. You may get your incisions wet in the shower. Do not vigorously scrub the area where your incisions are. Apply a clean, dry dressing after gently drying the area of your incisions. Don't take a tub bath, get into a swimming pool or Jacuzzi until the incisions are completely healed. That is, don't soak your incisions under water. Exercises:    Keep your arm in the sling at all times except when showering and changing your clothes. Keep your arm at your side. Don't move it away from your body. Ice/Elevation:    Continue ice consistently for the next 48 hours as needed for pain and swelling. You should ice your shoulder at least three times each day for one week in cycles of ice on for twenty minutes, ice off for twenty minutes. Repeat for a total of two hours each time. Diet:    You may advance to your regular diet as tolerated. Increase your clear liquid intake for the next 2 to 3 days.     Medication:    You will be given a prescription pain medicine when you are discharged from the hospital/surgery center. Take the medication on an as-needed basis according to the directions on the prescription bottle. Possible side effects of this medication include dizziness, headache, nausea, vomitting, constipation, and urinary retention. If you experience any of these side effects, call the office so that we may assist you in relieving them. Stop the use of pain medications if you develop excessive itching, a rash, shortness of breath, or difficulty swallowing. If these symptoms are severe or are not relieved by stopping the medication, you should seek immediate medical attention. Refills of pain medication are authorized during office hours ONLY (8am-5pm, M-F). You may resume the medications you were taking prior to surgery. Pain medication may potentiate the effects of any anti-depressant medication you are taking. If you have any questions about possible interactions between your regular medications and the pain medication, you should consult your medical doctor who prescribes your regular medications. You may use Ibuprofen 600mg three to four times a day for 2-3 days after surgery. This can be used along with your pain medication. Begin taking one 81mg Aspirin each day for 2 weeks, begin on the day of surgery. Follow-up with Dr. Lennox Cousins in 2 weeks. ______________________________________________________________________    Anesthesia Discharge Instructions    After general anesthesia or intervenous sedation, for 24 hours or while taking prescription Narcotics:  · Limit your activities  · Do not drive or operate hazardous machinery  · If you have not urinated within 8 hours after discharge, please contact your surgeon on call.   · Do not make important personal or business decisions  · Do not drink alcoholic beverages    Report the following to your surgeon:  · Excessive pain, swelling, redness or odor of or around the surgical area  · Temperature over 100.5 degrees  · Nausea and vomiting lasting longer than 4 hours or if unable to take medication  · Any signs of decreased circulation or nerve impairment to extremity:  Change in color, persistent numbness, tingling, coldness or increased pain.   · Any questions

## 2020-01-21 ENCOUNTER — APPOINTMENT (OUTPATIENT)
Dept: VASCULAR SURGERY | Age: 80
DRG: 312 | End: 2020-01-21
Attending: INTERNAL MEDICINE
Payer: MEDICARE

## 2020-01-21 ENCOUNTER — HOSPITAL ENCOUNTER (INPATIENT)
Age: 80
LOS: 3 days | Discharge: HOME | DRG: 312 | End: 2020-01-24
Attending: EMERGENCY MEDICINE | Admitting: INTERNAL MEDICINE
Payer: MEDICARE

## 2020-01-21 ENCOUNTER — APPOINTMENT (OUTPATIENT)
Dept: CT IMAGING | Age: 80
DRG: 312 | End: 2020-01-21
Attending: EMERGENCY MEDICINE
Payer: MEDICARE

## 2020-01-21 ENCOUNTER — APPOINTMENT (OUTPATIENT)
Dept: NON INVASIVE DIAGNOSTICS | Age: 80
DRG: 312 | End: 2020-01-21
Attending: INTERNAL MEDICINE
Payer: MEDICARE

## 2020-01-21 ENCOUNTER — APPOINTMENT (OUTPATIENT)
Dept: GENERAL RADIOLOGY | Age: 80
DRG: 312 | End: 2020-01-21
Attending: EMERGENCY MEDICINE
Payer: MEDICARE

## 2020-01-21 DIAGNOSIS — R56.9 OBSERVED SEIZURE-LIKE ACTIVITY (HCC): ICD-10-CM

## 2020-01-21 DIAGNOSIS — R56.9 FIRST TIME SEIZURE (HCC): ICD-10-CM

## 2020-01-21 DIAGNOSIS — R55 SYNCOPE AND COLLAPSE: Primary | ICD-10-CM

## 2020-01-21 DIAGNOSIS — S09.90XA INJURY OF HEAD, INITIAL ENCOUNTER: ICD-10-CM

## 2020-01-21 DIAGNOSIS — J18.9 PNEUMONIA OF RIGHT UPPER LOBE DUE TO INFECTIOUS ORGANISM: ICD-10-CM

## 2020-01-21 PROBLEM — R79.89 POSITIVE D DIMER: Status: ACTIVE | Noted: 2020-01-21

## 2020-01-21 PROBLEM — N40.0 BPH (BENIGN PROSTATIC HYPERPLASIA): Status: ACTIVE | Noted: 2020-01-21

## 2020-01-21 PROBLEM — F41.9 ANXIETY: Status: ACTIVE | Noted: 2020-01-21

## 2020-01-21 LAB
ALBUMIN SERPL-MCNC: 3.7 G/DL (ref 3.5–5)
ALBUMIN/GLOB SERPL: 1.5 {RATIO} (ref 1.1–2.2)
ALP SERPL-CCNC: 73 U/L (ref 45–117)
ALT SERPL-CCNC: 23 U/L (ref 12–78)
ANION GAP SERPL CALC-SCNC: 6 MMOL/L (ref 5–15)
APTT PPP: 27.3 SEC (ref 22.1–32)
AST SERPL-CCNC: 15 U/L (ref 15–37)
ATRIAL RATE: 80 BPM
AV VELOCITY RATIO: 0.7
B PERT DNA SPEC QL NAA+PROBE: NOT DETECTED
BASOPHILS # BLD: 0 K/UL (ref 0–0.1)
BASOPHILS NFR BLD: 0 % (ref 0–1)
BILIRUB SERPL-MCNC: 0.4 MG/DL (ref 0.2–1)
BNP SERPL-MCNC: 70 PG/ML
BORDETELLA PARAPERTUSSIS PCR, BORPAR: NOT DETECTED
BUN SERPL-MCNC: 18 MG/DL (ref 6–20)
BUN/CREAT SERPL: 18 (ref 12–20)
C PNEUM DNA SPEC QL NAA+PROBE: NOT DETECTED
CALCIUM SERPL-MCNC: 8.4 MG/DL (ref 8.5–10.1)
CALCULATED P AXIS, ECG09: 37 DEGREES
CALCULATED R AXIS, ECG10: 27 DEGREES
CALCULATED T AXIS, ECG11: 31 DEGREES
CHLORIDE SERPL-SCNC: 108 MMOL/L (ref 97–108)
CO2 SERPL-SCNC: 26 MMOL/L (ref 21–32)
COMMENT, HOLDF: NORMAL
CREAT SERPL-MCNC: 0.99 MG/DL (ref 0.7–1.3)
D DIMER PPP FEU-MCNC: 2.34 MG/L FEU (ref 0–0.65)
DIAGNOSIS, 93000: NORMAL
DIFFERENTIAL METHOD BLD: ABNORMAL
ECHO AO ROOT DIAM: 3.64 CM
ECHO AV AREA PEAK VELOCITY: 2.3 CM2
ECHO AV PEAK GRADIENT: 16 MMHG
ECHO AV PEAK VELOCITY: 199.76 CM/S
ECHO LA MAJOR AXIS: 3.67 CM
ECHO LA TO AORTIC ROOT RATIO: 1.01
ECHO LA VOL 2C: 46.43 ML (ref 18–58)
ECHO LA VOL 4C: 41.2 ML (ref 18–58)
ECHO LA VOL BP: 56.51 ML (ref 18–58)
ECHO LA VOL/BSA BIPLANE: 32.04 ML/M2 (ref 16–28)
ECHO LA VOLUME INDEX A2C: 26.32 ML/M2 (ref 16–28)
ECHO LA VOLUME INDEX A4C: 23.36 ML/M2 (ref 16–28)
ECHO LV INTERNAL DIMENSION DIASTOLIC: 5.35 CM (ref 4.2–5.9)
ECHO LV INTERNAL DIMENSION SYSTOLIC: 3.49 CM
ECHO LV IVSD: 0.84 CM (ref 0.6–1)
ECHO LV MASS 2D: 177.1 G (ref 88–224)
ECHO LV MASS INDEX 2D: 100.4 G/M2 (ref 49–115)
ECHO LV POSTERIOR WALL DIASTOLIC: 0.76 CM (ref 0.6–1)
ECHO LVOT DIAM: 2.03 CM
ECHO LVOT PEAK GRADIENT: 7.9 MMHG
ECHO LVOT PEAK VELOCITY: 140.28 CM/S
ECHO MV A VELOCITY: 115.42 CM/S
ECHO MV E DECELERATION TIME (DT): 209 MS
ECHO MV E VELOCITY: 90.5 CM/S
ECHO MV E/A RATIO: 0.78
ECHO MV REGURGITANT PEAK GRADIENT: 28.7 MMHG
ECHO MV REGURGITANT PEAK VELOCITY: 267.65 CM/S
ECHO PV MAX VELOCITY: 100.53 CM/S
ECHO PV PEAK GRADIENT: 4 MMHG
ECHO RV INTERNAL DIMENSION: 3.99 CM
ECHO TV REGURGITANT MAX VELOCITY: 254.06 CM/S
ECHO TV REGURGITANT PEAK GRADIENT: 25.8 MMHG
EOSINOPHIL # BLD: 0.1 K/UL (ref 0–0.4)
EOSINOPHIL NFR BLD: 1 % (ref 0–7)
ERYTHROCYTE [DISTWIDTH] IN BLOOD BY AUTOMATED COUNT: 13.6 % (ref 11.5–14.5)
FLUAV H1 2009 PAND RNA SPEC QL NAA+PROBE: NOT DETECTED
FLUAV H1 RNA SPEC QL NAA+PROBE: NOT DETECTED
FLUAV H3 RNA SPEC QL NAA+PROBE: NOT DETECTED
FLUAV SUBTYP SPEC NAA+PROBE: NOT DETECTED
FLUBV RNA SPEC QL NAA+PROBE: DETECTED
GLOBULIN SER CALC-MCNC: 2.5 G/DL (ref 2–4)
GLUCOSE SERPL-MCNC: 129 MG/DL (ref 65–100)
HADV DNA SPEC QL NAA+PROBE: NOT DETECTED
HCOV 229E RNA SPEC QL NAA+PROBE: NOT DETECTED
HCOV HKU1 RNA SPEC QL NAA+PROBE: NOT DETECTED
HCOV NL63 RNA SPEC QL NAA+PROBE: NOT DETECTED
HCOV OC43 RNA SPEC QL NAA+PROBE: NOT DETECTED
HCT VFR BLD AUTO: 41.1 % (ref 36.6–50.3)
HGB BLD-MCNC: 14 G/DL (ref 12.1–17)
HMPV RNA SPEC QL NAA+PROBE: NOT DETECTED
HPIV1 RNA SPEC QL NAA+PROBE: NOT DETECTED
HPIV2 RNA SPEC QL NAA+PROBE: NOT DETECTED
HPIV3 RNA SPEC QL NAA+PROBE: NOT DETECTED
HPIV4 RNA SPEC QL NAA+PROBE: NOT DETECTED
IMM GRANULOCYTES # BLD AUTO: 0 K/UL (ref 0–0.04)
IMM GRANULOCYTES NFR BLD AUTO: 0 % (ref 0–0.5)
INR PPP: 1.1 (ref 0.9–1.1)
LVFS 2D: 34.78 %
LYMPHOCYTES # BLD: 0.6 K/UL (ref 0.8–3.5)
LYMPHOCYTES NFR BLD: 8 % (ref 12–49)
M PNEUMO DNA SPEC QL NAA+PROBE: NOT DETECTED
MAGNESIUM SERPL-MCNC: 1.7 MG/DL (ref 1.6–2.4)
MCH RBC QN AUTO: 30.8 PG (ref 26–34)
MCHC RBC AUTO-ENTMCNC: 34.1 G/DL (ref 30–36.5)
MCV RBC AUTO: 90.5 FL (ref 80–99)
MONOCYTES # BLD: 0.6 K/UL (ref 0–1)
MONOCYTES NFR BLD: 8 % (ref 5–13)
MV DEC SLOPE: 4.33
NEUTS SEG # BLD: 6.1 K/UL (ref 1.8–8)
NEUTS SEG NFR BLD: 83 % (ref 32–75)
NRBC # BLD: 0 K/UL (ref 0–0.01)
NRBC BLD-RTO: 0 PER 100 WBC
P-R INTERVAL, ECG05: 170 MS
PHOSPHATE SERPL-MCNC: 1.9 MG/DL (ref 2.6–4.7)
PLATELET # BLD AUTO: 153 K/UL (ref 150–400)
PMV BLD AUTO: 9.2 FL (ref 8.9–12.9)
POTASSIUM SERPL-SCNC: 3.6 MMOL/L (ref 3.5–5.1)
PROT SERPL-MCNC: 6.2 G/DL (ref 6.4–8.2)
PROTHROMBIN TIME: 10.8 SEC (ref 9–11.1)
PV END DIASTOLIC VELOCITY: 0.8 MMHG
Q-T INTERVAL, ECG07: 368 MS
QRS DURATION, ECG06: 90 MS
QTC CALCULATION (BEZET), ECG08: 424 MS
RBC # BLD AUTO: 4.54 M/UL (ref 4.1–5.7)
RBC MORPH BLD: ABNORMAL
RSV RNA SPEC QL NAA+PROBE: NOT DETECTED
RV+EV RNA SPEC QL NAA+PROBE: NOT DETECTED
SAMPLES BEING HELD,HOLD: NORMAL
SODIUM SERPL-SCNC: 140 MMOL/L (ref 136–145)
THERAPEUTIC RANGE,PTTT: NORMAL SECS (ref 58–77)
TROPONIN I SERPL-MCNC: <0.05 NG/ML
VENTRICULAR RATE, ECG03: 80 BPM
WBC # BLD AUTO: 7.4 K/UL (ref 4.1–11.1)

## 2020-01-21 PROCEDURE — 84100 ASSAY OF PHOSPHORUS: CPT

## 2020-01-21 PROCEDURE — 74011250637 HC RX REV CODE- 250/637: Performed by: INTERNAL MEDICINE

## 2020-01-21 PROCEDURE — 97116 GAIT TRAINING THERAPY: CPT

## 2020-01-21 PROCEDURE — 74011250636 HC RX REV CODE- 250/636: Performed by: EMERGENCY MEDICINE

## 2020-01-21 PROCEDURE — 85379 FIBRIN DEGRADATION QUANT: CPT

## 2020-01-21 PROCEDURE — C8929 TTE W OR WO FOL WCON,DOPPLER: HCPCS

## 2020-01-21 PROCEDURE — 87449 NOS EACH ORGANISM AG IA: CPT

## 2020-01-21 PROCEDURE — 70450 CT HEAD/BRAIN W/O DYE: CPT

## 2020-01-21 PROCEDURE — 74011000250 HC RX REV CODE- 250: Performed by: INTERNAL MEDICINE

## 2020-01-21 PROCEDURE — 80053 COMPREHEN METABOLIC PANEL: CPT

## 2020-01-21 PROCEDURE — 85025 COMPLETE CBC W/AUTO DIFF WBC: CPT

## 2020-01-21 PROCEDURE — 83880 ASSAY OF NATRIURETIC PEPTIDE: CPT

## 2020-01-21 PROCEDURE — 84484 ASSAY OF TROPONIN QUANT: CPT

## 2020-01-21 PROCEDURE — 94762 N-INVAS EAR/PLS OXIMTRY CONT: CPT

## 2020-01-21 PROCEDURE — 0100U RESPIRATORY PANEL,PCR,NASOPHARYNGEAL: CPT

## 2020-01-21 PROCEDURE — 95816 EEG AWAKE AND DROWSY: CPT | Performed by: INTERNAL MEDICINE

## 2020-01-21 PROCEDURE — 85610 PROTHROMBIN TIME: CPT

## 2020-01-21 PROCEDURE — 74011000258 HC RX REV CODE- 258: Performed by: INTERNAL MEDICINE

## 2020-01-21 PROCEDURE — 74011636320 HC RX REV CODE- 636/320: Performed by: RADIOLOGY

## 2020-01-21 PROCEDURE — 71275 CT ANGIOGRAPHY CHEST: CPT

## 2020-01-21 PROCEDURE — 87899 AGENT NOS ASSAY W/OPTIC: CPT

## 2020-01-21 PROCEDURE — 93005 ELECTROCARDIOGRAM TRACING: CPT

## 2020-01-21 PROCEDURE — 93970 EXTREMITY STUDY: CPT

## 2020-01-21 PROCEDURE — 71045 X-RAY EXAM CHEST 1 VIEW: CPT

## 2020-01-21 PROCEDURE — 96375 TX/PRO/DX INJ NEW DRUG ADDON: CPT

## 2020-01-21 PROCEDURE — 83735 ASSAY OF MAGNESIUM: CPT

## 2020-01-21 PROCEDURE — 74011250636 HC RX REV CODE- 250/636: Performed by: INTERNAL MEDICINE

## 2020-01-21 PROCEDURE — 36415 COLL VENOUS BLD VENIPUNCTURE: CPT

## 2020-01-21 PROCEDURE — 65660000000 HC RM CCU STEPDOWN

## 2020-01-21 PROCEDURE — 99285 EMERGENCY DEPT VISIT HI MDM: CPT

## 2020-01-21 PROCEDURE — 74011250637 HC RX REV CODE- 250/637: Performed by: NURSE PRACTITIONER

## 2020-01-21 PROCEDURE — 86738 MYCOPLASMA ANTIBODY: CPT

## 2020-01-21 PROCEDURE — 85730 THROMBOPLASTIN TIME PARTIAL: CPT

## 2020-01-21 PROCEDURE — 96374 THER/PROPH/DIAG INJ IV PUSH: CPT

## 2020-01-21 PROCEDURE — 97161 PT EVAL LOW COMPLEX 20 MIN: CPT

## 2020-01-21 RX ORDER — ALPRAZOLAM 0.25 MG/1
0.25 TABLET ORAL 3 TIMES DAILY
Status: DISCONTINUED | OUTPATIENT
Start: 2020-01-21 | End: 2020-01-24 | Stop reason: HOSPADM

## 2020-01-21 RX ORDER — TESTOSTERONE CYPIONATE 200 MG/ML
200 INJECTION INTRAMUSCULAR
COMMUNITY

## 2020-01-21 RX ORDER — DIPHENHYDRAMINE HYDROCHLORIDE 50 MG/ML
25 INJECTION, SOLUTION INTRAMUSCULAR; INTRAVENOUS
Status: COMPLETED | OUTPATIENT
Start: 2020-01-21 | End: 2020-01-21

## 2020-01-21 RX ORDER — GUAIFENESIN 100 MG/5ML
81 LIQUID (ML) ORAL DAILY
Status: DISCONTINUED | OUTPATIENT
Start: 2020-01-22 | End: 2020-01-24 | Stop reason: HOSPADM

## 2020-01-21 RX ORDER — OSELTAMIVIR PHOSPHATE 75 MG/1
75 CAPSULE ORAL EVERY 12 HOURS
Status: DISCONTINUED | OUTPATIENT
Start: 2020-01-21 | End: 2020-01-24 | Stop reason: HOSPADM

## 2020-01-21 RX ORDER — TRAZODONE HYDROCHLORIDE 50 MG/1
50 TABLET ORAL
Status: DISCONTINUED | OUTPATIENT
Start: 2020-01-21 | End: 2020-01-24 | Stop reason: HOSPADM

## 2020-01-21 RX ORDER — ATORVASTATIN CALCIUM 20 MG/1
40 TABLET, FILM COATED ORAL
Status: DISCONTINUED | OUTPATIENT
Start: 2020-01-21 | End: 2020-01-24 | Stop reason: HOSPADM

## 2020-01-21 RX ORDER — METRONIDAZOLE 500 MG/100ML
500 INJECTION, SOLUTION INTRAVENOUS EVERY 12 HOURS
Status: DISCONTINUED | OUTPATIENT
Start: 2020-01-21 | End: 2020-01-24 | Stop reason: HOSPADM

## 2020-01-21 RX ORDER — SODIUM CHLORIDE 9 MG/ML
75 INJECTION, SOLUTION INTRAVENOUS CONTINUOUS
Status: DISCONTINUED | OUTPATIENT
Start: 2020-01-21 | End: 2020-01-24 | Stop reason: HOSPADM

## 2020-01-21 RX ORDER — ENOXAPARIN SODIUM 100 MG/ML
40 INJECTION SUBCUTANEOUS EVERY 24 HOURS
Status: DISCONTINUED | OUTPATIENT
Start: 2020-01-21 | End: 2020-01-24 | Stop reason: HOSPADM

## 2020-01-21 RX ORDER — PANTOPRAZOLE SODIUM 40 MG/1
40 TABLET, DELAYED RELEASE ORAL
Status: DISCONTINUED | OUTPATIENT
Start: 2020-01-21 | End: 2020-01-24 | Stop reason: HOSPADM

## 2020-01-21 RX ORDER — AMLODIPINE BESYLATE 5 MG/1
5 TABLET ORAL
Status: DISCONTINUED | OUTPATIENT
Start: 2020-01-22 | End: 2020-01-24 | Stop reason: HOSPADM

## 2020-01-21 RX ORDER — SODIUM CHLORIDE 0.9 % (FLUSH) 0.9 %
5-40 SYRINGE (ML) INJECTION EVERY 8 HOURS
Status: DISCONTINUED | OUTPATIENT
Start: 2020-01-21 | End: 2020-01-24 | Stop reason: HOSPADM

## 2020-01-21 RX ORDER — PANTOPRAZOLE SODIUM 40 MG/1
40 TABLET, DELAYED RELEASE ORAL
Status: DISCONTINUED | OUTPATIENT
Start: 2020-01-22 | End: 2020-01-21

## 2020-01-21 RX ORDER — TAMSULOSIN HYDROCHLORIDE 0.4 MG/1
0.4 CAPSULE ORAL
Status: DISCONTINUED | OUTPATIENT
Start: 2020-01-21 | End: 2020-01-24 | Stop reason: HOSPADM

## 2020-01-21 RX ORDER — SODIUM CHLORIDE 0.9 % (FLUSH) 0.9 %
5-40 SYRINGE (ML) INJECTION AS NEEDED
Status: DISCONTINUED | OUTPATIENT
Start: 2020-01-21 | End: 2020-01-24 | Stop reason: HOSPADM

## 2020-01-21 RX ADMIN — DIPHENHYDRAMINE HYDROCHLORIDE 25 MG: 50 INJECTION, SOLUTION INTRAMUSCULAR; INTRAVENOUS at 06:41

## 2020-01-21 RX ADMIN — TRAZODONE HYDROCHLORIDE 50 MG: 50 TABLET ORAL at 21:51

## 2020-01-21 RX ADMIN — SODIUM CHLORIDE 75 ML/HR: 900 INJECTION, SOLUTION INTRAVENOUS at 10:58

## 2020-01-21 RX ADMIN — METRONIDAZOLE 500 MG: 500 INJECTION, SOLUTION INTRAVENOUS at 14:07

## 2020-01-21 RX ADMIN — ATORVASTATIN CALCIUM 40 MG: 20 TABLET, FILM COATED ORAL at 21:51

## 2020-01-21 RX ADMIN — TAMSULOSIN HYDROCHLORIDE 0.4 MG: 0.4 CAPSULE ORAL at 21:51

## 2020-01-21 RX ADMIN — METHYLPREDNISOLONE SODIUM SUCCINATE 125 MG: 125 INJECTION, POWDER, FOR SOLUTION INTRAMUSCULAR; INTRAVENOUS at 06:41

## 2020-01-21 RX ADMIN — CEFTRIAXONE SODIUM 1 G: 1 INJECTION, POWDER, FOR SOLUTION INTRAVENOUS at 11:14

## 2020-01-21 RX ADMIN — IOPAMIDOL 80 ML: 755 INJECTION, SOLUTION INTRAVENOUS at 07:58

## 2020-01-21 RX ADMIN — AZITHROMYCIN MONOHYDRATE 500 MG: 500 INJECTION, POWDER, LYOPHILIZED, FOR SOLUTION INTRAVENOUS at 12:06

## 2020-01-21 RX ADMIN — Medication 10 ML: at 11:19

## 2020-01-21 RX ADMIN — SODIUM CHLORIDE 1000 ML: 9 INJECTION, SOLUTION INTRAVENOUS at 06:41

## 2020-01-21 RX ADMIN — PANTOPRAZOLE SODIUM 40 MG: 40 TABLET, DELAYED RELEASE ORAL at 17:34

## 2020-01-21 RX ADMIN — SODIUM PHOSPHATE, MONOBASIC, MONOHYDRATE AND SODIUM PHOSPHATE, DIBASIC, ANHYDROUS: 276; 142 INJECTION, SOLUTION INTRAVENOUS at 10:59

## 2020-01-21 RX ADMIN — Medication 10 ML: at 14:00

## 2020-01-21 RX ADMIN — PERFLUTREN 2 ML: 6.52 INJECTION, SUSPENSION INTRAVENOUS at 10:04

## 2020-01-21 RX ADMIN — ENOXAPARIN SODIUM 40 MG: 40 INJECTION SUBCUTANEOUS at 11:18

## 2020-01-21 RX ADMIN — Medication 10 ML: at 21:52

## 2020-01-21 RX ADMIN — ALPRAZOLAM 0.25 MG: 0.25 TABLET ORAL at 21:51

## 2020-01-21 RX ADMIN — ALPRAZOLAM 0.25 MG: 0.25 TABLET ORAL at 16:15

## 2020-01-21 RX ADMIN — OSELTAMIVIR PHOSPHATE 75 MG: 75 CAPSULE ORAL at 14:06

## 2020-01-21 RX ADMIN — METRONIDAZOLE 500 MG: 500 INJECTION, SOLUTION INTRAVENOUS at 21:51

## 2020-01-21 RX ADMIN — OSELTAMIVIR PHOSPHATE 75 MG: 75 CAPSULE ORAL at 21:51

## 2020-01-21 NOTE — PROGRESS NOTES
Automatic dose adjustment per [facility] P&T protocol for Metronidazole (Flagyl) for this 67 y.o. for indication of: Pneumonia, concern for aspiration. Current regimen: Metronidazole 500 mg Q8 hrs  Recommended regimen: Metronidazole 500 mg Q12 hours    Intervention:  1.  Pharmacy will automatically change the dose of metronidazole to 500 mg every 12 hours for all indications except for the following:   a. C. difficile infection   b. CNS infections   c. Non-anaerobic infections (giardiasis, trichomonas, H pylori, etc)     Thank you,  Stefani Lyn, [address] , ZIYADD Contact information: [phone]

## 2020-01-21 NOTE — PROGRESS NOTES
Admission Medication Reconciliation:     Information obtained from:    Patient  RxQuery data available¹:  YES    Comments/Recommendations:   Updated PTA medication list  Verified preferred pharmacy  Reviewed patient's allergies     ¹RxQuery pharmacy benefit data reflects medications filled and processed through the patient's insurance, however   this data does NOT capture whether the medication was picked up or is currently being taken by the patient. Prior to Admission Medications   Prescriptions Last Dose Informant Taking? ALPRAZolam (XANAX) 0.25 mg tablet 2020 at Unknown time Self Yes   Sig: Take 0.25 mg by mouth three (3) times daily. amLODIPine (NORVASC) 5 mg tablet 2020 at Unknown time Self Yes   Sig: Take 5 mg by mouth daily (after breakfast). aspirin 81 mg chewable tablet 2020 at Unknown time Self Yes   Sig: Take 81 mg by mouth daily. atorvastatin (LIPITOR) 40 mg tablet 2020 at Unknown time Self Yes   Sig: Take 40 mg by mouth nightly. naproxen sodium (ALEVE PO) 2020 at Unknown time Self Yes   Sig: Take 1 Tab by mouth two (2) times a day. Indications: back pain, recent rotator cuff surgery   omeprazole (PRILOSEC) 40 mg capsule 2020 at Unknown time Self Yes   Sig: Take 40 mg by mouth daily (after breakfast). tamsulosin (FLOMAX) 0.4 mg capsule 2020 at Unknown time Self Yes   Sig: Take 0.4 mg by mouth nightly. testosterone cypionate (DEPOTESTOTERONE CYPIONATE) 200 mg/mL injection 1/15/2020 Self Yes   Si mg by IntraMUSCular route See Admin Instructions. Every 10 days   traZODone (DESYREL) 100 mg tablet 2020 at Unknown time Self Yes   Sig: Take 50 mg by mouth nightly. Facility-Administered Medications: None         Please contact the main inpatient pharmacy with any questions or concerns at [phone] and we will direct you to the clinical pharmacist covering this patient's care while in-house.    Shalini Norris, PharmD, BCPS

## 2020-01-21 NOTE — H&P
[facility]  [address] [facility] [address]  [phone]    Admission History and Physical      NAME:  Warrick Phalen   :   1940   MRN:  [id number]     PCP:  Holger Salas MD     Date/Time:  2020         Subjective:     CHIEF COMPLAINT: passed out, flulike symptoms    HISTORY OF PRESENT ILLNESS:     Mr. Flakito Grey is a 67 y.o.  male who is admitted with syncope. Mr. Flakito Grey past medical history CAD status post CABG, hypertension, GERD, BPH, anxiety presented to ER he had a syncope this morning. Patient has not feeling well the last couple of days with flulike symptoms and yesterday she spent most of his time in bed. He felt feverish. yesterday yesterday night patient woke up with feeling of cold. Around 4 AM, he woke up to go to the bathroom and felt dizzy and passed out. According to his wife, he lost consciousness for a very short period of time. Later on he managed to go to bed by himself. He has been coughing recently and has been feverish. Past Medical History:   Diagnosis Date    Anxiety     Arthritis     BPH (benign prostatic hyperplasia)     Chronic pain     GERD (gastroesophageal reflux disease)     HTN (hypertension), benign     Rotator cuff tear     RIGHT     TIA (transient ischemic attack)         Past Surgical History:   Procedure Laterality Date    CARDIAC SURG PROCEDURE UNLIST      HX ADENOIDECTOMY      HX APPENDECTOMY      HX CORONARY ARTERY BYPASS GRAFT      HX GI      COLONOSCOPY    HX POLYPECTOMY      HX TONSILLECTOMY      HX VASECTOMY         Social History     Tobacco Use    Smoking status: Former Smoker     Packs/day: 1.00     Years: 20.00     Pack years: 20.00     Last attempt to quit:      Years since quittin.0    Smokeless tobacco: Never Used   Substance Use Topics    Alcohol use:  Yes     Alcohol/week: 7.0 standard drinks     Types: 7 Glasses of wine per week        Family History   Problem Relation Age of Onset    Cancer Mother         COLON    Cancer Father         BRAIN    Heart Disease Brother     Stroke Neg Hx     Anesth Problems Neg Hx         Allergies   Allergen Reactions    Iodinated Contrast Media Not Reported This Time        Prior to Admission medications    Medication Sig Start Date End Date Taking? Authorizing Provider   amLODIPine (NORVASC) 5 mg tablet Take 5 mg by mouth daily (after breakfast). Provider, Historical   tamsulosin (FLOMAX) 0.4 mg capsule Take 0.4 mg by mouth nightly. Provider, Historical   traZODone (DESYREL) 100 mg tablet Take 100 mg by mouth nightly. Provider, Historical   naproxen sodium (ALEVE PO) Take  by mouth two (2) times daily as needed. Provider, Historical   testosterone (ANDROGEL) 1 % (25 mg/2.5gram) glpk 25 mg by TransDERmal route. Indications: EVERY 10-15 DAYS    Provider, Historical   omeprazole (PRILOSEC) 40 mg capsule Take 40 mg by mouth daily (after breakfast). Provider, Historical   ALPRAZolam (XANAX) 0.25 mg tablet Take 0.25 mg by mouth two (2) times a day. Other, MD Stefani   aspirin 81 mg chewable tablet Take 81 mg by mouth daily. Provider, Historical   atorvastatin (LIPITOR) 40 mg tablet Take 40 mg by mouth nightly.     Provider, Historical         Review of Systems:  (bold if positive, if negative)    Gen:  Eyes:  ENT:  CVS:  Pulm:  GI:    :    MS:  Skin:  Psych:  Endo:    Hem:  Renal:    Neuro:            Objective:      VITALS:    Vital signs reviewed; most recent are:    Visit Vitals  /42   Pulse 68   Temp 98.7 °F (37.1 °C)   Resp 20   Ht 5' 7\" (1.702 m)   Wt 65.8 kg (145 lb)   SpO2 93%   BMI 22.71 kg/m²     SpO2 Readings from Last 6 Encounters:   01/21/20 93%   10/15/19 95%   04/19/19 99%   02/17/14 93%        No intake or output data in the 24 hours ending 01/21/20 0820         Exam:     Physical Exam:    Gen:  Well-developed, well-nourished, in no acute distress  HEENT: Pink conjunctivae, PERRL, hearing intact to voice, moist mucous membranes  Neck:  Supple, without masses, thyroid non-tender  Resp:  No accessory muscle use, clear breath sounds without wheezes rales or rhonchi  Card:  No murmurs, normal S1, S2 without thrills, bruits or peripheral edema  Abd:  Soft, non-tender, non-distended, normoactive bowel sounds are present, no palpable organomegaly and no detectable hernias  Lymph:  No cervical or inguinal adenopathy  Musc:  No cyanosis or clubbing  Skin:  No rashes or ulcers, skin turgor is good  Neuro:  Cranial nerves are grossly intact, no focal motor weakness, follows commands appropriately  Psych:  Good insight, oriented to person, place and time, alert       Labs:    Recent Labs     01/21/20  0601   WBC 7.4   HGB 14.0   HCT 41.1        Recent Labs     01/21/20  0601      K 3.6      CO2 26   *   BUN 18   CREA 0.99   CA 8.4*   MG 1.7   PHOS 1.9*   ALB 3.7   TBILI 0.4   SGOT 15   ALT 23     Lab Results   Component Value Date/Time    Glucose (POC) 167 (H) 04/19/2019 08:54 AM    Glucose (POC) 125 (H) 02/15/2014 07:36 PM     No results for input(s): PH, PCO2, PO2, HCO3, FIO2 in the last 72 hours. Recent Labs     01/21/20  0601   INR 1.1       Telemetry reviewed:   normal sinus rhythm       Assessment/Plan:    1. Syncope (1/21/2020) vs SZD. Patient felt dizzy before his syncopal episode. admit to telemetry. According to his wife his eyes rolled and had foaming in his mouth. when he gained consciousness, he became confused. Will check orthostatic vital signs, echocardiogram IV fluids. Monitor clinically. Consult neurology. CT of the head was unremarkable. EKG is unremarkable. Check respiratory viral panel as patient has been having flulike symptoms recently     2. HTN (hypertension), benign (2/15/2014) continue home medications. 3.  Esophageal reflux (2/15/2014) continue PPI. 4.  CAD s/p CABG/left hyperlipidemia (2/15/2014).   Continue aspirin and statin    5. BPH (benign prostatic hyperplasia) (1/21/2020). Continue Flomax    6. Anxiety (1/21/2020). Continue home medications    7. Positive D dimer (1/21/2020). Checking CTA of the chest.     addendum:   CTA of the chest: shows RT side pneumonia vs alveolitis. Possible mild thickening of the upper thoracic esophagus. ? Aspiration.  Started on ABx and consulted GI    Previous medical records reviewed     Risk of deterioration: high      Total time spent with patient: 79 [address] [place] [address] discussed with: Patient, Family, Nursing Staff and >50% of time spent in counseling and coordination of care    Discussed:  Care Plan    Prophylaxis:  Lovenox    Probable Disposition:  Home w/Family           ___________________________________________________    Attending Physician: Shekhar Rabago MD

## 2020-01-21 NOTE — ED NOTES
Bedside and Verbal shift change report given to Saadia Ayala (oncoming nurse) by Mehdi Soler (offgoing nurse). Report included the following information SBAR, ED Summary and MAR.

## 2020-01-21 NOTE — PROGRESS NOTES
Reason for Admission:   Syncope, elevated D Dimer. Hx CAD with CABG, HTN, GERD, BPH. RRAT Score:    5                 Plan for utilizing home health:  None at this time, no prior use of home health services, No DME. Current Advanced Directive/Advance Care Plan: not on file, next of kin is wife Myke Price  [phone]. Transition of Care Plan:         Chart reviewed, demographics verified. CM role and follow up discussed. Patient lives with his wife. He is independent and drives. Patient lives in a one/ story home with 4 steps to enter home. Patient has prescription drug coverage, uses [facility]  pharmacy in [place]. PLAN:  1. Monitor patient status and recommendations. 2. Await further evaluation and response to treatment. 3. Patient is likely home with family assist.  4. CM to monitor for discharge needs.     Care Management Interventions  PCP Verified by CM: Yes(Dr. Lili Conner)  Palliative Care Criteria Met (RRAT>21 & CHF Dx)?: No  Transition of Care Consult (CM Consult): Discharge Planning  Current Support Network: Lives with Spouse  Confirm Follow Up Transport: Family  Discharge Location  Discharge Placement: Home with family assistance      Brandon Hodges, RN, MSN, Care manager

## 2020-01-21 NOTE — PROGRESS NOTES
Problem: Mobility Impaired (Adult and Pediatric)  Goal: *Acute Goals and Plan of Care (Insert Text)  Description  FUNCTIONAL STATUS PRIOR TO ADMISSION: Patient was independent and active without use of DME.    HOME SUPPORT PRIOR TO ADMISSION: The patient lived with wife but did not require assist.    Physical Therapy Goals  Initiated 1/21/2020     1. Patient will transfer from bed to chair and chair to bed with independence using the least restrictive device within 7 day(s). 2.  Patient will perform sit to stand with independence within 7 day(s). 3.  Patient will ambulate with independence for 250 feet with the least restrictive device within 7 day(s). 4.  Patient will ascend/descend 2 stairs with 1 handrail(s) with supervision/set-up within 7 day(s). Outcome: Progressing Towards Goal     PHYSICAL THERAPY EVALUATION  Patient: Laurence Gonzales (24 y.o. male)  Date: 1/21/2020  Primary Diagnosis: Syncope [R55]  Syncope [R55]        Precautions: Fall         ASSESSMENT  Based on the objective data described below, the patient presents with generalized fatigue and dizziness related to illness. He is mobilizing near his functional baseline but has decreased activity tolerance. He reports that he was independent and very active prior to admission, running on the treadmill several times a week for 30 minutes. He lives with his wife in a single level home with 2 steps to enter and plans to return there at discharge. Overall, pt is mobilizing well and PT should not hold up discharge, however, should pt remain hospitalized, acute PT will continue to follow for 1-2 additional visits to fully assess functional tolerance as distance ambulation was limited today but droplet/contact precautions. Current Level of Function Impacting Discharge (mobility/balance): Supervision    Functional Outcome Measure:   The patient scored 80/100 on the Barthel Index outcome measure which is indicative of slightly decreased independence with ADLs. Other factors to consider for discharge: None     Patient will benefit from skilled therapy intervention to address the above noted impairments. PLAN :  Recommendations and Planned Interventions: transfer training, gait training, and therapeutic activities      Frequency/Duration: Patient will be followed by physical therapy:  5 times a week to address goals. Recommendation for discharge: (in order for the patient to meet his/her long term goals)  No skilled physical therapy/ follow up rehabilitation needs identified at this time. This discharge recommendation:  Has been made in collaboration with the attending provider and/or case management    IF patient discharges home will need the following DME: none         SUBJECTIVE:   Patient stated I'm just dizzy and tired but I'm like 40% better than when I got here.     OBJECTIVE DATA SUMMARY:   HISTORY:    Past Medical History:   Diagnosis Date    Anxiety     Arthritis     BPH (benign prostatic hyperplasia)     Chronic pain     GERD (gastroesophageal reflux disease)     HTN (hypertension), benign     Rotator cuff tear     RIGHT     TIA (transient ischemic attack) 2000     Past Surgical History:   Procedure Laterality Date    CARDIAC SURG PROCEDURE UNLIST  1988    HX ADENOIDECTOMY  1955    HX APPENDECTOMY  1955    HX CORONARY ARTERY BYPASS GRAFT  1986    HX GI      COLONOSCOPY    HX POLYPECTOMY      HX TONSILLECTOMY  1955    HX VASECTOMY  1990       Personal factors and/or comorbidities impacting plan of care:     Home Situation  Home Environment: Private residence  # Steps to Enter: 2  Rails to Enter: Yes  One/Two Story Residence: One story  Living Alone: No  Support Systems: Spouse/Significant Other/Partner  Patient Expects to be Discharged to[de-identified] Private residence  Current DME Used/Available at Home: None    EXAMINATION/PRESENTATION/DECISION MAKING:   Critical Behavior:  Neurologic State: Alert  Orientation Level: Oriented X4 Hearing: Auditory  Auditory Impairment: Hard of hearing, bilateral  Skin:  Defer to RN notes  Edema: Defer to RN notes  Range Of Motion:   WNL                       Strength: WNL                   Functional Mobility:  Bed Mobility:  Rolling: Independent  Supine to Sit: Independent  Sit to Supine: Independent  Scooting: Independent  Transfers:  Sit to Stand: Supervision  Stand to Sit: Supervision  Stand Pivot Transfers: Supervision     Bed to Chair: Supervision              Balance:   Sitting: Intact  Standing: Intact  Ambulation/Gait Training:              Gait Description (WDL): Within defined limits- ambulated 50ft within room. No gait deviations or LOB                Functional Measure:  Barthel Index:    Bathin  Bladder: 10  Bowels: 10  Groomin  Dressing: 10  Feeding: 10  Mobility: 10  Stairs: 5  Toilet Use: 5  Transfer (Bed to Chair and Back): 10  Total: 80/100       The Barthel ADL Index: Guidelines  1. The index should be used as a record of what a patient does, not as a record of what a patient could do. 2. The main aim is to establish degree of independence from any help, physical or verbal, however minor and for whatever reason. 3. The need for supervision renders the patient not independent. 4. A patient's performance should be established using the best available evidence. Asking the patient, friends/relatives and nurses are the usual sources, but direct observation and common sense are also important. However direct testing is not needed. 5. Usually the patient's performance over the preceding 24-48 hours is important, but occasionally longer periods will be relevant. 6. Middle categories imply that the patient supplies over 50 per cent of the effort. 7. Use of aids to be independent is allowed. Yaakov Tompkins., Barthel, D.W. (3435). Functional evaluation: the Barthel Index. [facility] (14)2. Antoinette Sheets maci JOSE Eller, Anita Bueno., Epifanio Avery., Danni, [address] [place] [address] (). Measuring the change indisability after inpatient rehabilitation; comparison of the responsiveness of the Barthel Index and Functional Caspian Measure. Journal of Neurology, Neurosurgery, and Psychiatry, 66(4), 141-282. ROLY Herrera, NATALIIA Tran, & Gurinder Swan M.A. (2004.) Assessment of post-stroke quality of life in cost-effectiveness studies: The usefulness of the Barthel Index and the EuroQoL-5D. Quality of Life Research, 15, 115-99           Physical Therapy Evaluation Charge Determination   History Examination Presentation Decision-Making   LOW Complexity : Zero comorbidities / personal factors that will impact the outcome / POC LOW Complexity : 1-2 Standardized tests and measures addressing body structure, function, activity limitation and / or participation in recreation  LOW Complexity : Stable, uncomplicated  LOW Complexity : FOTO score of       Based on the above components, the patient evaluation is determined to be of the following complexity level: LOW     Pain Rating:  No pain reported    Activity Tolerance:   Good  Please refer to the flowsheet for vital signs taken during this treatment. _ orthostatic BP taken. All WNL    After treatment patient left in no apparent distress:   Sitting in chair and Call bell within reach    COMMUNICATION/EDUCATION:   The patients plan of care was discussed with: Registered Nurse. Fall prevention education was provided and the patient/caregiver indicated understanding., Patient/family have participated as able in goal setting and plan of care. , and Patient/family agree to work toward stated goals and plan of care.     Thank you for this referral.  Eloy Darby PT, DPT   Time Calculation: 35 mins

## 2020-01-21 NOTE — ED NOTES
TRANSFER - OUT REPORT:    Verbal report given to Ramirez(name) on Mere Sweet  being transferred to [facility](unit) for routine progression of care       Report consisted of patients Situation, Background, Assessment and   Recommendations(SBAR). Information from the following report(s) SBAR, MAR, Recent Results and Cardiac Rhythm NSR was reviewed with the receiving nurse. Lines:   Peripheral IV 01/21/20 Left Hand (Active)   Site Assessment Clean, dry, & intact 1/21/2020  6:01 AM   Phlebitis Assessment 0 1/21/2020  6:01 AM   Dressing Status Clean, dry, & intact 1/21/2020  6:01 AM   Dressing Type Transparent 1/21/2020  6:01 AM   Hub Color/Line Status Blue 1/21/2020  6:01 AM       Peripheral IV 01/21/20 Left Antecubital (Active)   Site Assessment Clean, dry, & intact 1/21/2020  6:03 AM   Phlebitis Assessment 0 1/21/2020  6:03 AM   Infiltration Assessment 0 1/21/2020  6:03 AM   Dressing Status Clean, dry, & intact 1/21/2020  6:03 AM   Dressing Type Transparent 1/21/2020  6:03 AM   Hub Color/Line Status Pink 1/21/2020  6:03 AM   Action Taken Catheter retaped 1/21/2020  6:03 AM        Opportunity for questions and clarification was provided.       Patient transported with:   Registered Nurse

## 2020-01-21 NOTE — ED PROVIDER NOTES
The patient is a 78-year-old male with a past medical history significant for anxiety, arthritis, BPH, chronic pain, GERD, hypertension, rotator cuff surgery, TIA, status post MI with CABG, T&A, appendectomy, vasectomy who presents to the ED by EMS for evaluation after having a syncopal episode. The patient states that on the way to the bathroom tonight he apparently passed out and was found on the floor where he laid for several hours per his wife with and called 911. Upon arrival, the patient was not awake alert and responsive with a GCS of 15. He was brought to the ER for further evaluation. The patient is complaining of headache and right shoulder pain which is chronic for him in view of recent rotator cuff surgery. He denies any neck pain, back pain, chest pain or shortness of breath, nausea, vomiting, abdominal pain, diarrhea, constipation, dysuria, dizziness, extremity weakness or numbness, prior history of syncope.             Past Medical History:   Diagnosis Date    Anxiety     Arthritis     BPH (benign prostatic hyperplasia)     Chronic pain     GERD (gastroesophageal reflux disease)     HTN (hypertension), benign     Rotator cuff tear     RIGHT     TIA (transient ischemic attack) 2000       Past Surgical History:   Procedure Laterality Date    CARDIAC SURG PROCEDURE UNLIST  1988    HX ADENOIDECTOMY  1955    HX APPENDECTOMY  1955    HX CORONARY ARTERY BYPASS GRAFT  1986    HX GI      COLONOSCOPY    HX POLYPECTOMY      HX TONSILLECTOMY  1955    HX VASECTOMY  1990         Family History:   Problem Relation Age of Onset    Cancer Mother         COLON    Cancer Father         BRAIN    Heart Disease Brother     Stroke Neg Hx     Anesth Problems Neg Hx        Social History     Socioeconomic History    Marital status:      Spouse name: Not on file    Number of children: Not on file    Years of education: Not on file    Highest education level: Not on file   Occupational History    Not on file   Social Needs    Financial resource strain: Not on file    Food insecurity:     Worry: Not on file     Inability: Not on file    Transportation needs:     Medical: Not on file     Non-medical: Not on file   Tobacco Use    Smoking status: Former Smoker     Packs/day: 1.00     Years: 20.00     Pack years: 20.00     Last attempt to quit:      Years since quittin.0    Smokeless tobacco: Never Used   Substance and Sexual Activity    Alcohol use: Yes     Alcohol/week: 7.0 standard drinks     Types: 7 Glasses of wine per week    Drug use: No    Sexual activity: Not on file   Lifestyle    Physical activity:     Days per week: Not on file     Minutes per session: Not on file    Stress: Not on file   Relationships    Social connections:     Talks on phone: Not on file     Gets together: Not on file     Attends Oriental orthodox service: Not on file     Active member of club or organization: Not on file     Attends meetings of clubs or organizations: Not on file     Relationship status: Not on file    Intimate partner violence:     Fear of current or ex partner: Not on file     Emotionally abused: Not on file     Physically abused: Not on file     Forced sexual activity: Not on file   Other Topics Concern    Not on file   Social History Narrative    Not on file         ALLERGIES: Iodinated contrast media    Review of Systems   All other systems reviewed and are negative. Vitals:    20 0557   BP: 158/67   Pulse: 81   Resp: 16   Temp: 98.7 °F (37.1 °C)   SpO2: 95%   Weight: 65.8 kg (145 lb)   Height: 5' 7\" (1.702 m)            Physical Exam  Vitals signs and nursing note reviewed. CONSTITUTIONAL: Well-appearing; well-nourished; in no apparent distress  HEAD: Normocephalic; atraumatic  EYES: PERRL; EOM intact; conjunctiva and sclera are clear bilaterally.   ENT: No rhinorrhea; normal pharynx with no tonsillar hypertrophy; mucous membranes pink/moist, no erythema, no exudate. NECK: Supple; non-tender; no cervical lymphadenopathy  CARD: Normal S1, S2; no murmurs, rubs, or gallops. Regular rate and rhythm. RESP: Normal respiratory effort; breath sounds clear and equal bilaterally; no wheezes, rhonchi, or rales. ABD: Normal bowel sounds; non-distended; non-tender; no palpable organomegaly, no masses, no bruits. Back Exam: Normal inspection; no vertebral point tenderness, no CVA tenderness. Normal range of motion. EXT: Normal ROM in all four extremities; non-tender to palpation; no swelling or deformity; distal pulses are normal, no edema. SKIN: Warm; dry; no rash. NEURO:Alert and oriented x 3, coherent, LILA-XII grossly intact, sensory and motor are non-focal.        MDM  Number of Diagnoses or Management Options  Diagnosis management comments: Assessment: 15-year-old male who presents to the ED for evaluation for syncope and head injury rule out cardiac dysrhythmia, ICH, CVA, electrolyte abnormality, VTE and orthostasis. The patient appears hemodynamically stable at this time. Plan: Lab/EKG/chest x-ray/CT scan of the head/IV fluid/orthostatic/serial exam/ Monitor and Reevaluate.          Amount and/or Complexity of Data Reviewed  Clinical lab tests: ordered and reviewed  Tests in the radiology section of CPT®: ordered and reviewed  Tests in the medicine section of CPT®: reviewed and ordered  Discussion of test results with the performing providers: yes  Decide to obtain previous medical records or to obtain history from someone other than the patient: yes  Obtain history from someone other than the patient: yes  Review and summarize past medical records: yes  Discuss the patient with other providers: yes  Independent visualization of images, tracings, or specimens: yes    Risk of Complications, Morbidity, and/or Mortality  Presenting problems: moderate  Diagnostic procedures: moderate  Management options: moderate    Patient Progress  Patient progress: stable Procedures      ED EKG interpretation:  Rhythm: normal sinus rhythm; and regular . Rate (approx.): 80; Axis: normal; P wave: normal; QRS interval: normal ; ST/T wave: normal; in  Lead: Diffusely; Other findings: borderline ekg. This EKG was interpreted by Dano Clements MD,ED Provider. 06:00 AM    XRAY INTERPRETATION (ED MD)  Chest Xray  No acute process seen. Normal heart size. No bony abnormalities. No infiltrate. Aramis Alvarado MD 6:08 AM    PROGRESS NOTE:  Pt has been reexamined by Aramis Alvarado MD all available results have been reviewed with pt and any available family. Pt understands sx, dx, and tx in ED. Care plan has been outlined and questions have been answered. Pt and any available family understands and agrees to need for admission to hospital for further tx not available in ED. Pt is ready for admission. Written by Dano Clements MD,  7:06 AM    Hospitalist Perfect Serve for Admission  7:06 AM    ED Room Number: ED42/96  Patient Name and age:  Bobby Patel 67 y.o.  male  Working Diagnosis:   1. Syncope and collapse    2. Injury of head, initial encounter      Readmission: no  Isolation Requirements:  no  Recommended Level of Care:  telemetry  Code Status:  Full Code  Department:[facility] ED - [phone]  Other:      CONSULT NOTE:  Aramis Alvarado MD spoke with Dr. Jeff Gutierrez of the adult hospitalist team. Discussed patient's presentation, history, physical assessment, and available diagnostic results. He will evaluate, write orders and admit the patient to the hospital. 7:07 AM    .     .

## 2020-01-21 NOTE — CONSULTS
[address] [place] [address]. [address] [place] [address] NP  [phone]                    GASTROENTEROLOGY CONSULTATION NOTE              NAME:  Sameera Sears   :   1940   MRN:   [id number]       Referring Physician:   Dr. Yolanda Lopez Date:   2020 4:21 PM    Chief Complaint:    Esophageal thickening     History of Present Illness:    Patient is a 67 y.o. who we have been asked to see in consultation for the above complaint. The patient presented from home after a syncopal episode last night. His wife reports she found him on the floor and noticed he had vomited. He had a chest ct scan done which shows esophageal thickening. He endorses history of GERD and has been taking Ranitidine at home along with Tums PRN. He denies melena, dysphagia. Denies any nausea and vomiting prior to his syncopal event and has had none since. He endorses occasional diarrhea and weight loss. He reports he is due for a screening colonoscopy. PMH:  Past Medical History:   Diagnosis Date    Anxiety     Arthritis     BPH (benign prostatic hyperplasia)     Chronic pain     GERD (gastroesophageal reflux disease)     HTN (hypertension), benign     Rotator cuff tear     RIGHT     TIA (transient ischemic attack)        PSH:  Past Surgical History:   Procedure Laterality Date    CARDIAC SURG PROCEDURE UNLIST      HX ADENOIDECTOMY      HX APPENDECTOMY      HX CORONARY ARTERY BYPASS GRAFT      HX GI      COLONOSCOPY    HX POLYPECTOMY      HX TONSILLECTOMY      HX VASECTOMY         Allergies: Allergies   Allergen Reactions    Iodinated Contrast Media Shortness of Breath     Lung aspiration  Short of breath       Home Medications:  Prior to Admission Medications   Prescriptions Last Dose Informant Patient Reported? Taking? ALPRAZolam (XANAX) 0.25 mg tablet 2020 at Unknown time Self Yes Yes   Sig: Take 0.25 mg by mouth three (3) times daily.    amLODIPine (NORVASC) 5 mg tablet 2020 at Unknown time Self Yes Yes   Sig: Take 5 mg by mouth daily (after breakfast). aspirin 81 mg chewable tablet 2020 at Unknown time Self Yes Yes   Sig: Take 81 mg by mouth daily. atorvastatin (LIPITOR) 40 mg tablet 2020 at Unknown time Self Yes Yes   Sig: Take 40 mg by mouth nightly. naproxen sodium (ALEVE PO) 2020 at Unknown time Self Yes Yes   Sig: Take 1 Tab by mouth two (2) times a day. Indications: back pain, recent rotator cuff surgery   omeprazole (PRILOSEC) 40 mg capsule 2020 at Unknown time Self Yes Yes   Sig: Take 40 mg by mouth daily (after breakfast). tamsulosin (FLOMAX) 0.4 mg capsule 2020 at Unknown time Self Yes Yes   Sig: Take 0.4 mg by mouth nightly. testosterone cypionate (DEPOTESTOTERONE CYPIONATE) 200 mg/mL injection 1/15/2020 Self Yes Yes   Si mg by IntraMUSCular route See Admin Instructions. Every 10 days   traZODone (DESYREL) 100 mg tablet 2020 at Unknown time Self Yes Yes   Sig: Take 50 mg by mouth nightly.       Facility-Administered Medications: None       Hospital Medications:  Current Facility-Administered Medications   Medication Dose Route Frequency    sodium chloride (NS) flush 5-40 mL  5-40 mL IntraVENous Q8H    sodium chloride (NS) flush 5-40 mL  5-40 mL IntraVENous PRN    0.9% sodium chloride infusion  75 mL/hr IntraVENous CONTINUOUS    enoxaparin (LOVENOX) injection 40 mg  40 mg SubCUTAneous Q24H    cefTRIAXone (ROCEPHIN) 1 g in 0.9% sodium chloride (MBP/ADV) 50 mL ADV  1 g IntraVENous Q24H    azithromycin (ZITHROMAX) 500 mg in 0.9% sodium chloride (MBP/ADV) 250 mL  500 mg IntraVENous Q24H    metroNIDAZOLE (FLAGYL) IVPB premix 500 mg  500 mg IntraVENous Q12H    ALPRAZolam (XANAX) tablet 0.25 mg  0.25 mg Oral TID    [START ON 2020] amLODIPine (NORVASC) tablet 5 mg  5 mg Oral DAILY AFTER BREAKFAST    [START ON 2020] aspirin chewable tablet 81 mg  81 mg Oral DAILY    atorvastatin (LIPITOR) tablet 40 mg 40 mg Oral QHS    [START ON 2020] pantoprazole (PROTONIX) tablet 40 mg  40 mg Oral ACB    tamsulosin (FLOMAX) capsule 0.4 mg  0.4 mg Oral QHS    traZODone (DESYREL) tablet 50 mg  50 mg Oral QHS    oseltamivir (TAMIFLU) capsule 75 mg  75 mg Oral Q12H       Social History:  Social History     Tobacco Use    Smoking status: Former Smoker     Packs/day: 1.00     Years: 20.00     Pack years: 20.00     Last attempt to quit:      Years since quittin.0    Smokeless tobacco: Never Used   Substance Use Topics    Alcohol use:  Yes     Alcohol/week: 7.0 standard drinks     Types: 7 Glasses of wine per week       Family History:  Family History   Problem Relation Age of Onset    Cancer Mother         COLON    Cancer Father         BRAIN    Heart Disease Brother     Stroke Neg Hx     Anesth Problems Neg Hx        Review of Systems:  Constitutional: negative fever, negative chills, negative weight loss  Eyes:   negative visual changes  ENT:   negative sore throat, tongue or lip swelling  Respiratory:  negative cough, negative dyspnea  Cards:  negative for chest pain, palpitations, lower extremity edema  GI:   See HPI  :  negative for frequency, dysuria  Integument:  negative for rash and pruritus  Heme:  negative for easy bruising and gum/nose bleeding  Musculoskel: negative for myalgias,  back pain and muscle weakness  Neuro: negative for headaches, dizziness, vertigo  Psych:  negative for feelings of anxiety, depression     Objective:     Patient Vitals for the past 8 hrs:   BP Temp Pulse Resp SpO2 Height Weight   20 1600 131/71 97.9 °F (36.6 °C) 66 16 95 %     20 1235 115/67         20 1232 116/66         20 1230 117/67         20 1054 102/67         20 1053 123/71         20 1052 128/70         20 1044 146/64 98 °F (36.7 °C) 66 18 96 %     20 1004 146/64     5' 7\" (1.702 m) 65.8 kg (145 lb)   20 Liv 12     01/21 0701 - 01/21 1900  In: -   Out: 960 [Urine:960]  No intake/output data recorded. EXAM:     NEURO-alert, oriented x3, affect appropriate   HEENT-Head: Normocephalic, no lesions, without obvious abnormality. LUNGS-clear to auscultation bilaterally    COR-regular rate and rhythym     ABD- soft, non-tender. Bowel sounds normal. No masses,  no organomegaly     EXT-no edema    Skin - No rash     Data Review     Recent Labs     01/21/20  0601   WBC 7.4   HGB 14.0   HCT 41.1        Recent Labs     01/21/20 0601      K 3.6      CO2 26   BUN 18   CREA 0.99   *   PHOS 1.9*   CA 8.4*     Recent Labs     01/21/20 0601   SGOT 15   AP 73   TP 6.2*   ALB 3.7   GLOB 2.5     Recent Labs     01/21/20  0601   INR 1.1   PTP 10.8   APTT 27.3       Patient Active Problem List   Diagnosis Code    Chest pain, unspecified R07.9    Dizziness R42    Generalized weakness R53.1    HTN (hypertension), benign I10    Esophageal reflux K21.9    Hyperlipidemia E78.5    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 M75.121    Syncope R55    BPH (benign prostatic hyperplasia) N40.0    Anxiety F41.9    Positive D dimer R79.89       Assessment and Plan:  Esophageal thickening:  Concerning for esophagitis. He is currently having no alarm symptoms  - BID PPI  - Avoid NSAIDs  - Supportive care  - Diet as tolerated. - Monitor Labs    Will see again on request.  Please have him follow up in the office after his discharge for EGD and screening colonoscopy. Please call with any questions or concerns. Thanks for allowing me to participate in the care of this patient.   Signed By: Maty Andre NP     1/21/2020  4:21 PM

## 2020-01-21 NOTE — PROGRESS NOTES
TRANSFER - IN REPORT:    Verbal report received from Adriane(name) on [employer]  being received from ED(unit) for routine progression of care      Report consisted of patients Situation, Background, Assessment and   Recommendations(SBAR). Information from the following report(s) SBAR, Kardex, ED Summary, Intake/Output, MAR, Recent Results and Cardiac Rhythm NSR was reviewed with the receiving nurse. Opportunity for questions and clarification was provided. Assessment completed upon patients arrival to unit and care assumed. 0850 Pt arrived to the unit. 4161 Placed on droplet and conttact precautions. 1257 Positive for Infulenca B. Discontinue contact precautions. 1640 Pt was taken to Vascular studies. As patient got up off the bed with staff at his side, patient stated that he felt little dizzy. Patient took 10 steps towards the wheel chair with staff assisting him. Patient then became unresponsive for approx. 4-6 seconds. Pt did not loose balance as staff was standing next to him, holding. Pt stated that he did not know what happened. Pt walked back to the bed with staff assistance. Pt was then taken to Vascular studies on a strecher. Dr Gina Peters notified. 46 Pt returned from Vascular studies. Bedside shift change report given to Brandy Yu (oncoming nurse) by Antonio Guo (offgoing nurse). Report included the following information SBAR, Kardex, ED Summary, Intake/Output, MAR, Recent Results, Med Rec Status and Cardiac Rhythm NSR.

## 2020-01-21 NOTE — ED TRIAGE NOTES
As per EMS, pt sick since yesterday. Woke up at 0330 to go to the bathroom and passed out. Reports LOC and falling on the right side, primarily shoulder where he had recent surgery. Does not recall hitting his head. Nausea and vomiting.

## 2020-01-22 ENCOUNTER — APPOINTMENT (OUTPATIENT)
Dept: VASCULAR SURGERY | Age: 80
DRG: 312 | End: 2020-01-22
Attending: NURSE PRACTITIONER
Payer: MEDICARE

## 2020-01-22 ENCOUNTER — HOSPITAL ENCOUNTER (OUTPATIENT)
Dept: MRI IMAGING | Age: 80
Discharge: HOME | DRG: 312 | End: 2020-01-22
Attending: NURSE PRACTITIONER
Payer: MEDICARE

## 2020-01-22 LAB
ANION GAP SERPL CALC-SCNC: 5 MMOL/L (ref 5–15)
BUN SERPL-MCNC: 18 MG/DL (ref 6–20)
BUN/CREAT SERPL: 21 (ref 12–20)
CALCIUM SERPL-MCNC: 8.5 MG/DL (ref 8.5–10.1)
CHLORIDE SERPL-SCNC: 108 MMOL/L (ref 97–108)
CO2 SERPL-SCNC: 26 MMOL/L (ref 21–32)
CREAT SERPL-MCNC: 0.86 MG/DL (ref 0.7–1.3)
ERYTHROCYTE [DISTWIDTH] IN BLOOD BY AUTOMATED COUNT: 13.5 % (ref 11.5–14.5)
EST. AVERAGE GLUCOSE BLD GHB EST-MCNC: 114 MG/DL
GLUCOSE SERPL-MCNC: 126 MG/DL (ref 65–100)
HBA1C MFR BLD: 5.6 % (ref 4–5.6)
HCT VFR BLD AUTO: 39.8 % (ref 36.6–50.3)
HGB BLD-MCNC: 13.5 G/DL (ref 12.1–17)
M PNEUMO IGG SER IA-ACNC: 109 U/ML (ref 0–99)
M PNEUMO IGM SER IA-ACNC: <770 U/ML (ref 0–769)
MCH RBC QN AUTO: 30.4 PG (ref 26–34)
MCHC RBC AUTO-ENTMCNC: 33.9 G/DL (ref 30–36.5)
MCV RBC AUTO: 89.6 FL (ref 80–99)
NRBC # BLD: 0 K/UL (ref 0–0.01)
NRBC BLD-RTO: 0 PER 100 WBC
PHOSPHATE SERPL-MCNC: 3.4 MG/DL (ref 2.6–4.7)
PLATELET # BLD AUTO: 177 K/UL (ref 150–400)
PMV BLD AUTO: 9.6 FL (ref 8.9–12.9)
POTASSIUM SERPL-SCNC: 3.9 MMOL/L (ref 3.5–5.1)
RBC # BLD AUTO: 4.44 M/UL (ref 4.1–5.7)
SODIUM SERPL-SCNC: 139 MMOL/L (ref 136–145)
WBC # BLD AUTO: 7.1 K/UL (ref 4.1–11.1)

## 2020-01-22 PROCEDURE — 74011250636 HC RX REV CODE- 250/636: Performed by: INTERNAL MEDICINE

## 2020-01-22 PROCEDURE — 84100 ASSAY OF PHOSPHORUS: CPT

## 2020-01-22 PROCEDURE — 97116 GAIT TRAINING THERAPY: CPT

## 2020-01-22 PROCEDURE — 70553 MRI BRAIN STEM W/O & W/DYE: CPT

## 2020-01-22 PROCEDURE — 74011250637 HC RX REV CODE- 250/637: Performed by: NURSE PRACTITIONER

## 2020-01-22 PROCEDURE — A9575 INJ GADOTERATE MEGLUMI 0.1ML: HCPCS | Performed by: RADIOLOGY

## 2020-01-22 PROCEDURE — 65660000000 HC RM CCU STEPDOWN

## 2020-01-22 PROCEDURE — 36415 COLL VENOUS BLD VENIPUNCTURE: CPT

## 2020-01-22 PROCEDURE — 80048 BASIC METABOLIC PNL TOTAL CA: CPT

## 2020-01-22 PROCEDURE — 74011250636 HC RX REV CODE- 250/636: Performed by: RADIOLOGY

## 2020-01-22 PROCEDURE — 74011250637 HC RX REV CODE- 250/637: Performed by: INTERNAL MEDICINE

## 2020-01-22 PROCEDURE — 85027 COMPLETE CBC AUTOMATED: CPT

## 2020-01-22 PROCEDURE — 83036 HEMOGLOBIN GLYCOSYLATED A1C: CPT

## 2020-01-22 PROCEDURE — 74011000258 HC RX REV CODE- 258: Performed by: INTERNAL MEDICINE

## 2020-01-22 PROCEDURE — 93880 EXTRACRANIAL BILAT STUDY: CPT

## 2020-01-22 RX ORDER — GADOTERATE MEGLUMINE 376.9 MG/ML
13 INJECTION INTRAVENOUS
Status: COMPLETED | OUTPATIENT
Start: 2020-01-22 | End: 2020-01-22

## 2020-01-22 RX ORDER — CODEINE PHOSPHATE AND GUAIFENESIN 10; 100 MG/5ML; MG/5ML
10 SOLUTION ORAL
Status: DISCONTINUED | OUTPATIENT
Start: 2020-01-22 | End: 2020-01-24 | Stop reason: HOSPADM

## 2020-01-22 RX ADMIN — GADOTERATE MEGLUMINE 13 ML: 376.9 INJECTION INTRAVENOUS at 19:24

## 2020-01-22 RX ADMIN — METRONIDAZOLE 500 MG: 500 INJECTION, SOLUTION INTRAVENOUS at 09:03

## 2020-01-22 RX ADMIN — CEFTRIAXONE SODIUM 1 G: 1 INJECTION, POWDER, FOR SOLUTION INTRAVENOUS at 10:48

## 2020-01-22 RX ADMIN — AZITHROMYCIN MONOHYDRATE 500 MG: 500 INJECTION, POWDER, LYOPHILIZED, FOR SOLUTION INTRAVENOUS at 13:27

## 2020-01-22 RX ADMIN — GUAIFENESIN AND CODEINE PHOSPHATE 10 ML: 100; 10 SOLUTION ORAL at 21:58

## 2020-01-22 RX ADMIN — ALPRAZOLAM 0.25 MG: 0.25 TABLET ORAL at 16:37

## 2020-01-22 RX ADMIN — ALPRAZOLAM 0.25 MG: 0.25 TABLET ORAL at 21:53

## 2020-01-22 RX ADMIN — METRONIDAZOLE 500 MG: 500 INJECTION, SOLUTION INTRAVENOUS at 21:53

## 2020-01-22 RX ADMIN — PANTOPRAZOLE SODIUM 40 MG: 40 TABLET, DELAYED RELEASE ORAL at 16:37

## 2020-01-22 RX ADMIN — ALPRAZOLAM 0.25 MG: 0.25 TABLET ORAL at 09:02

## 2020-01-22 RX ADMIN — OSELTAMIVIR PHOSPHATE 75 MG: 75 CAPSULE ORAL at 21:53

## 2020-01-22 RX ADMIN — ENOXAPARIN SODIUM 40 MG: 40 INJECTION SUBCUTANEOUS at 10:48

## 2020-01-22 RX ADMIN — SODIUM CHLORIDE 75 ML/HR: 900 INJECTION, SOLUTION INTRAVENOUS at 09:43

## 2020-01-22 RX ADMIN — Medication 10 ML: at 06:35

## 2020-01-22 RX ADMIN — Medication 10 ML: at 13:27

## 2020-01-22 RX ADMIN — OSELTAMIVIR PHOSPHATE 75 MG: 75 CAPSULE ORAL at 09:02

## 2020-01-22 RX ADMIN — ASPIRIN 81 MG 81 MG: 81 TABLET ORAL at 09:02

## 2020-01-22 RX ADMIN — TRAZODONE HYDROCHLORIDE 50 MG: 50 TABLET ORAL at 21:53

## 2020-01-22 RX ADMIN — PANTOPRAZOLE SODIUM 40 MG: 40 TABLET, DELAYED RELEASE ORAL at 06:30

## 2020-01-22 RX ADMIN — TAMSULOSIN HYDROCHLORIDE 0.4 MG: 0.4 CAPSULE ORAL at 21:53

## 2020-01-22 RX ADMIN — ATORVASTATIN CALCIUM 40 MG: 20 TABLET, FILM COATED ORAL at 21:53

## 2020-01-22 RX ADMIN — GUAIFENESIN AND CODEINE PHOSPHATE 10 ML: 100; 10 SOLUTION ORAL at 16:37

## 2020-01-22 RX ADMIN — AMLODIPINE BESYLATE 5 MG: 5 TABLET ORAL at 09:02

## 2020-01-22 NOTE — CONSULTS
MICHA SECOURS: [id number] [address] Neurology  [address]  [phone]      Name:   Mariella Zoroastrian record #: [id number]  Admission Date: 1/21/2020     Who Consulted: Manny Maxwell    Reason for Consult:  Questionable seizure    HISTORY OF PRESENT ILLNESS:     This is a 67 y.o. male who is admitted for sycope. Mr. Luiz Rojo presented to the ED after an episode in which he was on the way to the bathroom he and passed out. He was found on the floor by his wife several hours later. Upon arrival, the patient was awake alert and responsive with a GCS of 15. The Neurology Service is asked to evaluate for seizure versus stroke. Neuro-imaging:     CT Head: No acute intracranial process    Carotid Doppler:     EKG: normal sinus rhythm. Care Plan discussed with:  Patient x   Family    RN    Care Manager    Consultant/Specialist:         Thank you for allowing the Neurology Service the pleasure of participating in the care of your patient. This patient will be discussed with my collaborating care team physician,  Dr. Carol Cesar, and he may have further recommendations regarding this patient's care      [facility], ACNP-BC  ====================      Attending Attestation:       NEUROLOGY ATTENDING ADDENDUM:    January 22, 2020    Pt personally seen and examined. Chart reviewed. Agree with advanced NP's history, exam and  A/P with changes/additions. Discussed with patient. He says he was feeling sick for a few days, with cough. He had gotten up around 4 AM to get something to eat. Was walking to kitchen, suddenly started feeling very dizzy. Turned around and went back to bedroom. Turned on lights to bedroom, then doesn't remember what happened next. Wife awoke when he turned on lights, and told him later that he passed out, falling backwards, and she saw him have copious foaming at mouth, eyes rolled back in head, and body stiffening/ trembling.   He says she estimated he was unresponsive for 1 minute. He was lethargic for about 15 minutes and then back to himself by the time he was in the ER here. No prior hx of seizure and no FHx seizure that he's aware of. Has been found to have right sided pneumonia vs alveolitis while here, and then found to have CommAcqPneumonia and Influenza B. Pt says during his admission he was going for a test and stood up quickly from bed and was going to a wheelchair and he passed out close to the wheelchair, someone caught him and put him in chair. No observed convulsion. Orthostatics checked early this AM, around 3 AM     Supine: HR 54 /60    Sitting: HR 53 / 73  Standing: HR 63, / 68    Carotid dopplers: no significant stenosis. MRI Brain (PRELIM report): no emergent process. To my view, no evidence of recent stroke, mild T2 white matter abnormalities consistent with small vessel ischemic changes. EEG: essentially normal awake EEG (mild excess beta activity suggestive of medication effect: xanax or desirel). HR generally running 49-57 over the past 24 hrs       Patient Vitals for the past 4 hrs:   Temp Pulse Resp BP SpO2   01/22/20 1947 97.9 °F (36.6 °C) (!) 59 16 127/71 95 %         Exam   General: awake, alert, conversant. Oriented to person,place, situation. CN: EOMI, Face symmetric, Facial sensation intact bilaterally, Hearing grossly normal, Language normal (no aphasia, no dysarthria), Tongue Midline, Shrug symmetric  Motor: 5/5 right upper and lower extremity, 5/5 left lower ext, 4/5 left upper ext (prior stroke)  Sensory: intact LT in all exts  Cerebellar: no resting tremors  DTRs: 1+ biceps and patellars  Gait: normal    Impression/ Plan    67 y.o. male with Community Acquired Pneumonia / + for Infuenza B  Episode of syncope a/w foaming at mouth, eyes rolled back in head and body stiffening. Had second episode of syncope while here. HR running low over last 24 hours.   EEG doesn't show any seizure discharges and MRI Brain doesn't show any evidence of recent stroke. D/w patient that the description of the first event is consistent with seizure (possibly symptomatic from the Pneumonia), while the second event doesn't (possibly due to bradycardia). Since no abnormality on MRI Brain and EEG, and no prior lifetime seizure, not going to be starting patient on anti-seizure medication at this time. D/w him that if her were to have another seizure-like event, he should be started on anti-seizure medication at that time. Defer to Hospitalist on whether Cardiology Consult is indicated for these episodes of syncope. D/w patient no driving x 6 months after last episode of syncope or seizure, per DMV regulation. He expressed understanding. He can f/u with me in Neurology Clinic 2 weeks after discharge and if no further episodes, I will complete DMV forms to send in and they may decide to let him return to driving earlier. Will sign off. Call back if any questions. Thank you for the consultation. Signed By: Seymour Alvarez MD     January 22, 2020             Assessment/Plan:     1. Syncope r/o Stroke:    · Continue home ASA 81 mg  · Neurochecks:  Every 4 hours  · Blood Sugar Goal:  140-180  · BP Goal: Less than 180/105 for 24 hours  · Telemetry for at least 24 hours  · Routine EEG    Stroke work up  · A1C:  5.6  · LDL:    · TTE:  Estimated left ventricular ejection fraction is 45 - 50%. · MRI:  · Carotid vascular imaging:  Findings are consistent with 0-49% stenosis of the right internal carotid and  0-49% stenosis of the left internal carotid    Risk factors for stroke include:  Hx of stroke, HTN, CAD, HLD,  physical inactivity  · Discussed with patient    · Discussed signs/symptoms of stroke and when to call 911    2. Mobility:   · Has not been OOB. · PT/OT to eval for rehab   ·          Review of Systems: 10 point ROS was performed. Pertinent positives listed in HPI. Negative ROS is as follows. Pt denies: angina, palpitations, paresthesias, weakness, vision loss, slurred speech, aphasia, confusion, fever, chills,  headache, diplopia, back pain, neck pain, prior episodes of vertigo, hallucinations, new medications or dosage changes. Allergies: Allergies   Allergen Reactions    Iodinated Contrast Media Shortness of Breath     Lung aspiration  Short of breath       Outpatient Meds  No current facility-administered medications on file prior to encounter. Current Outpatient Medications on File Prior to Encounter   Medication Sig Dispense Refill    testosterone cypionate (DEPOTESTOTERONE CYPIONATE) 200 mg/mL injection 200 mg by IntraMUSCular route See Admin Instructions. Every 10 days      amLODIPine (NORVASC) 5 mg tablet Take 5 mg by mouth daily (after breakfast).  tamsulosin (FLOMAX) 0.4 mg capsule Take 0.4 mg by mouth nightly.  traZODone (DESYREL) 100 mg tablet Take 50 mg by mouth nightly.  naproxen sodium (ALEVE PO) Take 1 Tab by mouth two (2) times a day. Indications: back pain, recent rotator cuff surgery      omeprazole (PRILOSEC) 40 mg capsule Take 40 mg by mouth daily (after breakfast).  ALPRAZolam (XANAX) 0.25 mg tablet Take 0.25 mg by mouth three (3) times daily.  aspirin 81 mg chewable tablet Take 81 mg by mouth daily.  atorvastatin (LIPITOR) 40 mg tablet Take 40 mg by mouth nightly.          Inpatient Meds    Current Facility-Administered Medications   Medication Dose Route Frequency Provider Last Rate Last Dose    guaiFENesin-codeine (ROBITUSSIN AC) 100-10 mg/5 mL solution 10 mL  10 mL Oral Q4H PRN Aguila Ruiz MD   10 mL at 01/22/20 1637    sodium chloride (NS) flush 5-40 mL  5-40 mL IntraVENous Q8H Aguila Ruiz MD   10 mL at 01/22/20 1327    sodium chloride (NS) flush 5-40 mL  5-40 mL IntraVENous PRN Aguila Ruiz MD        0.9% sodium chloride infusion  75 mL/hr IntraVENous CONTINUOUS Eduardo Reyes MD 75 mL/hr at 01/22/20 0943 75 mL/hr at 01/22/20 0943    enoxaparin (LOVENOX) injection 40 mg  40 mg SubCUTAneous Q24H Aguila Ruiz MD   40 mg at 01/22/20 1048    cefTRIAXone (ROCEPHIN) 1 g in 0.9% sodium chloride (MBP/ADV) 50 mL ADV  1 g IntraVENous Q24H Aguila Ruiz  mL/hr at 01/22/20 1048 1 g at 01/22/20 1048    azithromycin (ZITHROMAX) 500 mg in 0.9% sodium chloride (MBP/ADV) 250 mL  500 mg IntraVENous Q24H Aguila Ruiz  mL/hr at 01/22/20 1327 500 mg at 01/22/20 1327    metroNIDAZOLE (FLAGYL) IVPB premix 500 mg  500 mg IntraVENous Q12H Aguila Ruiz  mL/hr at 01/22/20 0903 500 mg at 01/22/20 6267    ALPRAZolam (XANAX) tablet 0.25 mg  0.25 mg Oral TID Aguila Dolan MD   0.25 mg at 01/22/20 1637    amLODIPine (NORVASC) tablet 5 mg  5 mg Oral DAILY AFTER Aguila Alicea MD   5 mg at 01/22/20 0902    aspirin chewable tablet 81 mg  81 mg Oral DAILY Aguila Ruiz MD   81 mg at 01/22/20 0902    atorvastatin (LIPITOR) tablet 40 mg  40 mg Oral QHS Aguila Ruiz MD   40 mg at 01/21/20 2151    tamsulosin (FLOMAX) capsule 0.4 mg  0.4 mg Oral QHS Aguila Ruiz MD   0.4 mg at 01/21/20 2151    traZODone (DESYREL) tablet 50 mg  50 mg Oral QHS Aguila Ruiz MD   50 mg at 01/21/20 2151    oseltamivir (TAMIFLU) capsule 75 mg  75 mg Oral Q12H Rhoda Ruiz MD   75 mg at 01/22/20 0902    pantoprazole (PROTONIX) tablet 40 mg  40 mg Oral ACB&D Audra Keenan NP   40 mg at 01/22/20 1637           Past Medical History:   Diagnosis Date    Anxiety     Arthritis     BPH (benign prostatic hyperplasia)     Chronic pain     GERD (gastroesophageal reflux disease)     HTN (hypertension), benign     Rotator cuff tear     RIGHT     TIA (transient ischemic attack) 2000       Past Surgical History:   Procedure Laterality Date    CARDIAC SURG PROCEDURE UNLIST  1988    HX ADENOIDECTOMY  1955    HX [address]    HX GI COLONOSCOPY    HX POLYPECTOMY      HX TONSILLECTOMY  1955    HX VASECTOMY  1990       family history includes Cancer in his father and mother; Heart Disease in his brother. reports that he quit smoking about 31 years ago. He has a 20.00 pack-year smoking history. He has never used smokeless tobacco. He reports current alcohol use of about 7.0 standard drinks of alcohol per week. He reports that he does not use drugs.            Lab Results (last 24 hrs)  Recent Results (from the past 24 hour(s))   METABOLIC PANEL, BASIC    Collection Time: 01/22/20  3:11 AM   Result Value Ref Range    Sodium 139 136 - 145 mmol/L    Potassium 3.9 3.5 - 5.1 mmol/L    Chloride 108 97 - 108 mmol/L    CO2 26 21 - 32 mmol/L    Anion gap 5 5 - 15 mmol/L    Glucose 126 (H) 65 - 100 mg/dL    BUN 18 6 - 20 MG/DL    Creatinine 0.86 0.70 - 1.30 MG/DL    BUN/Creatinine ratio 21 (H) 12 - 20      GFR est AA >60 >60 ml/min/1.73m2    GFR est non-AA >60 >60 ml/min/1.73m2    Calcium 8.5 8.5 - 10.1 MG/DL   CBC W/O DIFF    Collection Time: 01/22/20  3:11 AM   Result Value Ref Range    WBC 7.1 4.1 - 11.1 K/uL    RBC 4.44 4.10 - 5.70 M/uL    HGB 13.5 12.1 - 17.0 g/dL    HCT 39.8 36.6 - 50.3 %    MCV 89.6 80.0 - 99.0 FL    MCH 30.4 26.0 - 34.0 PG    MCHC 33.9 30.0 - 36.5 g/dL    RDW 13.5 11.5 - 14.5 %    PLATELET 796 726 - 760 K/uL    MPV 9.6 8.9 - 12.9 FL    NRBC 0.0 0  WBC    ABSOLUTE NRBC 0.00 0.00 - 0.01 K/uL   PHOSPHORUS    Collection Time: 01/22/20  3:11 AM   Result Value Ref Range    Phosphorus 3.4 2.6 - 4.7 MG/DL   HEMOGLOBIN A1C WITH EAG    Collection Time: 01/22/20  3:11 AM   Result Value Ref Range    Hemoglobin A1c 5.6 4.0 - 5.6 %    Est. average glucose 114 mg/dL   DUPLEX CAROTID BILATERAL    Collection Time: 01/22/20 12:14 PM   Result Value Ref Range    Right subclavian sys 186.4 cm/s    RIGHT SUBCLAVIAN ARTERY D 10.86 cm/s    Right cca dist sys 104.3 cm/s    Right CCA dist ulloa 10.7 cm/s    Right CCA prox sys 194.3 cm/s    Right CCA prox ulloa 6.0 cm/s    Right eca sys 80.1 cm/s    RIGHT EXTERNAL CAROTID ARTERY D 5.81 cm/s    Right ICA dist sys 47.6 cm/s    Right ICA dist ulloa 10.7 cm/s    Right ICA mid sys 78.2 cm/s    Right ICA mid ulloa 18.0 cm/s    Right ICA prox sys 47.5 cm/s    Right ICA prox ulloa 12.4 cm/s    Right vertebral sys 36.5 cm/s    RIGHT VERTEBRAL ARTERY D 5.26 cm/s    Right ICA/CCA sys 0.8     Left subclavian sys 146.3 cm/s    LEFT SUBCLAVIAN ARTERY D 0.00 cm/s    Left CCA dist sys 59.8 cm/s    Left CCA dist ulloa 11.0 cm/s    Left CCA prox sys 80.8 cm/s    Left CCA prox ulloa 11.9 cm/s    Left ECA sys 86.4 cm/s    LEFT EXTERNAL CAROTID ARTERY D 7.25 cm/s    Left ICA dist sys 41.9 cm/s    Left ICA dist ulloa 12.1 cm/s    Left ICA mid sys 54.3 cm/s    Left ICA mid ulloa 15.0 cm/s    Left ICA prox sys 52.4 cm/s    Left ICA prox ulloa 10.4 cm/s    Left vertebral sys 33.1 cm/s    LEFT VERTEBRAL ARTERY D 6.39 cm/s    Left ICA/CCA sys 0.91

## 2020-01-22 NOTE — PROCEDURES
Name:   Laurence Gonzales  YOB: 1940  MRN:   [id number]           Procedure:   EEG     Location:   [place] [address]  Date of Recordin20  Date of Interpretation:    20    Interpreting physician: Ramu Noyola MD  Requesting provider:   Hernandez Marquez MD    Indication: 67 y.o. male with complaints of syncope    Current medications: has a current medication list which includes the following prescription(s): testosterone cypionate, amlodipine, tamsulosin, trazodone, naproxen sodium, omeprazole, alprazolam, aspirin, and atorvastatin, and the following Facility-Administered Medications: guaifenesin-codeine, sodium chloride, sodium chloride, 0.9% sodium chloride, enoxaparin, cefTRIAXone (ROCEPHIN) 1 g in 0.9% sodium chloride (MBP/ADV) 50 mL ADV, azithromycin (ZITHROMAX) 500 mg in 0.9% sodium chloride (MBP/ADV) 250 mL, metronidazole, alprazolam, amlodipine, aspirin, atorvastatin, tamsulosin, trazodone, oseltamivir, and pantoprazole. Technical:   Digital EEG recorded in 10-20 international placement system, multiple montages    Interpretation:   The patient is described as awake from the outset of the recording. There is the background is symmetric low amplitude, with excessive beta activity suggestive of medication effect (potentially due to Xanax or Desyrel). Photic stimulation resulted in a symmetric driving response. Posterior dominant rhythm is hard to discern as there is significant overriding beta activity and there is no clear. Where the patient is resting eyes closed. The PDR is estimated to be 10 to 12 Hz bilaterally. There is no evidence of sleep or drowsiness during the recording. There is no focal area of slowing or epileptiform discharge. Single lead EKG is unremarkable. Impression: This is a essentially normal awake EEG recording. There is excessive beta activity which is suggestive of medication effect (see above interpretation).   There were no epileptiform discharges or focal areas of slowing. Clinical correlation is necessary.         Hamida Richardson MD  [facility]

## 2020-01-22 NOTE — PROGRESS NOTES
Bedside and Verbal shift change report given to Janessa (oncoming nurse) by Dana Potter (offgoing nurse). Report included the following information SBAR, Kardex and Cardiac Rhythm sinus leonidas. 5120 orthostatics completed on patient, 2 minutes left between each reading. Patient did not indicate any light headedness or dizziness this AM.     1140 patient off floor for carotid duplex, will give antibiotics when patient returns. 1300 patient ambulating in room with steady gait. [phone] patient off floor for MRI, tele notified. Bedside and Verbal shift change report given to Dana Potter (oncoming nurse) by Марина Martinez (offgoing nurse). Report included the following information SBAR, Kardex and Cardiac Rhythm sinus leonidas.

## 2020-01-22 NOTE — PROGRESS NOTES
Care Management follow up    Patient admitted for pneumonia, FluB, syncope. Hx CAD with CABG, HTN, GERD, BPH. RRAT score 7    Current status  Patient remains on IV Rocephin and Zithromax. Carotid duplex study today. CM will follow. Transition of Care Plan  1. Continue to monitor patient response to treatment and recommendations. 2. Likely home with family assist.  3. CM to follow.     Barbara Rodriguez RN, MSN/Care manager

## 2020-01-22 NOTE — ROUTINE PROCESS
1938 
Patient and wife stated the patient is seeing floaters. Stated he seen them before and went to the eye doctor. They dilated his eyes and had not had a issue with them until his fall. Their daughter in law is a neurologist and suggested he have a CTA and EEG. Advised those test have already been ordered. 9962 Bedside and Verbal shift change report given to Janessa (oncoming nurse) by Debbi Concepcion (offgoing nurse). Report included the following information SBAR, Kardex, ED Summary, Procedure Summary, Intake/Output, MAR, Recent Results and Cardiac Rhythm sinus leonidas.

## 2020-01-22 NOTE — PROGRESS NOTES
Problem: Mobility Impaired (Adult and Pediatric)  Goal: *Acute Goals and Plan of Care (Insert Text)  Description  FUNCTIONAL STATUS PRIOR TO ADMISSION: Patient was independent and active without use of DME.    HOME SUPPORT PRIOR TO ADMISSION: The patient lived with wife but did not require assist.    Physical Therapy Goals  Initiated 1/21/2020     1. Patient will transfer from bed to chair and chair to bed with independence using the least restrictive device within 7 day(s). 2.  Patient will perform sit to stand with independence within 7 day(s). 3.  Patient will ambulate with independence for 250 feet with the least restrictive device within 7 day(s). 4.  Patient will ascend/descend 2 stairs with 1 handrail(s) with supervision/set-up within 7 day(s). Outcome: Progressing Towards Goal  Note:   PHYSICAL THERAPY TREATMENT  Patient: Hussain Pride (69 y.o. male)  Date: 1/22/2020  Diagnosis: Syncope [R55]  Syncope [R55]   <principal problem not specified>       Precautions:    Chart, physical therapy assessment, plan of care and goals were reviewed. ASSESSMENT  Patient continues with skilled PT services and is progressing towards goals. Pt received up ad ena in BR washing hair in sink. Denies dizziness with change of head position. Gait training, within room, with supervision, no LOB with turns    Current Level of Function Impacting Discharge (mobility/balance): SBA/Supervision    Other factors to consider for discharge:          PLAN :  Patient continues to benefit from skilled intervention to address the above impairments. Continue treatment per established plan of care. to address goals. Recommendation for discharge: (in order for the patient to meet his/her long term goals)  No skilled physical therapy/ follow up rehabilitation needs identified at this time.     This discharge recommendation:  Has been made in collaboration with the attending provider and/or case management    IF patient discharges home will need the following DME: none       SUBJECTIVE:   Patient stated i am much better, I don't think I need you.     OBJECTIVE DATA SUMMARY:   Critical Behavior:  Neurologic State: Alert  Orientation Level: Oriented X4        Functional Mobility Training:  Bed Mobility:     Supine to Sit: Independent  Sit to Supine: Independent  Scooting: Independent        Transfers:  Sit to Stand: Modified independent  Stand to Sit: Modified independent                             Balance:  Sitting: Intact  Standing: Intact; Without support  Ambulation/Gait Training:  Distance (ft): 40 Feet (ft)     Ambulation - Level of Assistance: Supervision                                               Stairs: Therapeutic Exercises:     Pain Rating:  Denies pain    Activity Tolerance:   Good  Please refer to the flowsheet for vital signs taken during this treatment.     After treatment patient left in no apparent distress:   Supine in bed and Call bell within reach    COMMUNICATION/COLLABORATION:   The patients plan of care was discussed with: Registered Nurse    Broderick Bray   Time Calculation: 20 mins

## 2020-01-22 NOTE — PROGRESS NOTES
Nutrition Assessment:    RECOMMENDATIONS/INTERVENTION(S):   1. Continue with Regular diet order to encourage PO intakes. 2. Will order chocolate Ensure Enlive daily for pt to try and increase kcal intakes. Will continue to monitor PO intakes of meals/ONS, weight for trends, labs, GI.    ASSESSMENT:   1/22: 66 yo male admitted for syncope. RD assessment for low BMI. PMhx: CAD, CABG, HTN, GERD. Underweight per BMI per advanced age. Pt off floor at time of visit. Wife in room, states pt has lost 5lbs recently  (unsure of time frame) - this indicates 3% loss which is not significant. Unable to visually assess pt. Regular diet ordered. Pt's wife states pt has been eating about his normal intakes as at home. She indicates his appetite has been fair at home and since admission. Intakes documented as 50% meals. Pt has never tried ONS before, wife thinks pt would be willing to try Ensure Enlive. Noted to have esophageal thickening - GI consulted. Wife denies pt having any difficulty swallowing at home. Labs reviewed. Meds: statin. NaCl ordered at 75ml/hr. Skin intact. Diet Order: Regular  % Eaten:    Patient Vitals for the past 72 hrs:   % Diet Eaten   01/21/20 1700 50 %   01/21/20 1300 50 %   01/21/20 1004 (P) 50 %       Pertinent Medications: [x] Reviewed    Labs: [x] Reviewed    Anthropometrics: Height: 5' 7\" (170.2 cm) Weight: 65.8 kg (145 lb)    IBW (%IBW):   ( ) UBW (%UBW):   (  %)      BMI: Body mass index is 22.71 kg/m². This BMI is indicative of:   [x] Underweight  - per age  [] Normal    [] Overweight    []  Obesity    []  Extreme Obesity (BMI>40)  Estimated Nutrition Needs (Based on): 1986 Kcals/day(REE 1336 x AF 1.3 + 250) , 66 g(66-79gm (1-1.2gm/kg/d)) Protein  Carbohydrate:  At Least 130 g/day  Fluids: 1986mL/day (1 ml/kcal)    Last BM: 1/21   [x]Active     []Hyperactive  []Hypoactive       [] Absent   BS  Skin:    [x] Intact   [] Incision  [] Breakdown   [] DTI   [] Tears/Excoriation/Abrasion  []Edema [] Other:    Wt Readings from Last 30 Encounters:   01/21/20 65.8 kg (145 lb)   10/08/19 68 kg (150 lb)   04/19/19 65.8 kg (145 lb)   02/15/14 72.6 kg (160 lb)      NUTRITION DIAGNOSES:   Problem:  Unintended weight loss     Etiology: related to decreased ability to consume sufficient energy to maintain weight     Signs/Symptoms: as evidenced by recent 5lb weight loss per pt's wife      NUTRITION INTERVENTIONS:  Meals/Snacks: General/healthful diet   Supplements: Commercial supplement              GOAL:   Consume > 50% all meals + ONS to promote weight stability/gain of 0.5lb within next 3-5 days    Cultural, Orthodox, or Ethnic Dietary Needs: None     EDUCATION & DISCHARGE NEEDS:    [x] None Identified   [] Identified and Education Provided/Documented   [] Identified and Pt declined/was not appropriate      [x] Interdisciplinary Care Plan Reviewed/Documented    [x] Discharge Needs:    General healthy diet with ONS daily   [] No Nutrition Related Discharge Needs    NUTRITION RISK:   Pt Is At Nutrition Risk  [x]     No Nutrition Risk Identified  []       PT SEEN FOR:    []  MD Consult: []Calorie Count      []Diabetic Diet Education        []Diet Education     []Electrolyte Management     []General Nutrition Management and Supplements     []Management of Tube Feeding     []TPN Recommendations    []  RN Referral:  []MST score >=2     []Enteral/Parenteral Nutrition PTA     []Pregnant: Gestational DM or Multigestation                 [] Pressure Ulcer    [x]  Low BMI      []  Length of Stay       [] Dysphagia Diet         [] Ventilator  []  Follow-up     Previous Recommendations:   [] Implemented          [] Not Implemented          [x] Not Applicable    Previous Goal:   [] Met              [] Progressing Towards Goal              [] Not Progressing Towards Goal   [x] Not Applicable            Gardner Riedel, [address]  Pager [phone]  Phone [phone]

## 2020-01-22 NOTE — PROGRESS NOTES
Physical Therapy    Pt off floor for carotid duplex will attempt later this afternoon    Liane Childs

## 2020-01-22 NOTE — PROGRESS NOTES
Rick Chavira [facility]  [address] [facility], [address] [place] [address]  [phone]      Medical Progress Note      NAME: Warrick Phalen   :  1940  MRM:  [id number]    Date/Time: 2020  8:31 AM       Assessment and Plan:   1. Syncope (2020) vs SZD. Patient felt dizzy before his syncopal episode. According to his wife his eyes rolled and had foaming in his mouth. when he gained consciousness, he became confused. Had another episode her yesterday when he was about to transfer to wheelchair. Will check orthostatic vital signs. Echocardiogram reviewed. Monitor clinically. check EEG and consult neurology. CT of the head was unremarkable. EKG is unremarkable. 2.  CAP/ influenzae B. CTA of the chest: Right lower lobe pneumonia versus alveolitis. Continue ABx, pneumonia workup, check sputum for gram stain. On tamiflu. Check BC     3. HTN (hypertension), benign (2/15/2014) continue home medications.      4.  Esophageal reflux (2/15/2014) continue PPI.      5. CAD s/p CABG/left hyperlipidemia (2/15/2014). Continue aspirin and statin     6. BPH (benign prostatic hyperplasia) (2020). Continue Flomax     7. Anxiety (2020). Continue home medications     8. Positive D dimer (2020). Checking CTA of the chest is without PE     9.  Esophageal thickening on CT scan. Evaluated by GI and recommended outpatient FU. Subjective:     Chief Complaint[de-identified] Patient was seen and examined as a follow up for syncope. Chart was reviewed. c/o severe cough     ROS:  (bold if positive, if negative)    Tolerating PT  Tolerating Diet        Objective:     Last 24hrs VS reviewed since prior progress note.  Most recent are:    Visit Vitals  /69 (BP 1 Location: Left arm, BP Patient Position: At rest)   Pulse (!) 51   Temp 97.8 °F (36.6 °C)   Resp 16   Ht 5' 7\" (1.702 m)   Wt 65.8 kg (145 lb)   SpO2 94%   BMI 22.71 kg/m²     SpO2 Readings from Last 6 Encounters: 01/22/20 94%   10/15/19 95%   04/19/19 99%   02/17/14 93%            Intake/Output Summary (Last 24 hours) at 1/22/2020 0831  Last data filed at 1/22/2020 0631  Gross per 24 hour   Intake 1558.75 ml   Output 1360 ml   Net 198.75 ml        Physical Exam:    Gen:  Well-developed, well-nourished, in no acute distress  HEENT:  Pink conjunctivae, PERRL, hearing intact to voice, moist mucous membranes  Neck:  Supple, without masses, thyroid non-tender  Resp:  No accessory muscle use, clear breath sounds without wheezes rales or rhonchi  Card:  No murmurs, normal S1, S2 without thrills, bruits or peripheral edema  Abd:  Soft, non-tender, non-distended, normoactive bowel sounds are present, no palpable organomegaly and no detectable hernias  Lymph:  No cervical or inguinal adenopathy  Musc:  No cyanosis or clubbing  Skin:  No rashes or ulcers, skin turgor is good  Neuro:  Cranial nerves are grossly intact, no focal motor weakness, follows commands appropriately  Psych:  Good insight, oriented to person, place and time, alert  __________________________________________________________________  Medications Reviewed: (see below)  Medications:     Current Facility-Administered Medications   Medication Dose Route Frequency    sodium chloride (NS) flush 5-40 mL  5-40 mL IntraVENous Q8H    sodium chloride (NS) flush 5-40 mL  5-40 mL IntraVENous PRN    0.9% sodium chloride infusion  75 mL/hr IntraVENous CONTINUOUS    enoxaparin (LOVENOX) injection 40 mg  40 mg SubCUTAneous Q24H    cefTRIAXone (ROCEPHIN) 1 g in 0.9% sodium chloride (MBP/ADV) 50 mL ADV  1 g IntraVENous Q24H    azithromycin (ZITHROMAX) 500 mg in 0.9% sodium chloride (MBP/ADV) 250 mL  500 mg IntraVENous Q24H    metroNIDAZOLE (FLAGYL) IVPB premix 500 mg  500 mg IntraVENous Q12H    ALPRAZolam (XANAX) tablet 0.25 mg  0.25 mg Oral TID    amLODIPine (NORVASC) tablet 5 mg  5 mg Oral DAILY AFTER BREAKFAST    aspirin chewable tablet 81 mg  81 mg Oral DAILY    atorvastatin (LIPITOR) tablet 40 mg  40 mg Oral QHS    tamsulosin (FLOMAX) capsule 0.4 mg  0.4 mg Oral QHS    traZODone (DESYREL) tablet 50 mg  50 mg Oral QHS    oseltamivir (TAMIFLU) capsule 75 mg  75 mg Oral Q12H    pantoprazole (PROTONIX) tablet 40 mg  40 mg Oral ACB&D        Lab Data Reviewed: (see below)  Lab Review:     Recent Labs     01/22/20  0311 01/21/20  0601   WBC 7.1 7.4   HGB 13.5 14.0   HCT 39.8 41.1    153     Recent Labs     01/22/20  0311 01/21/20  0601    140   K 3.9 3.6    108   CO2 26 26   * 129*   BUN 18 18   CREA 0.86 0.99   CA 8.5 8.4*   MG  --  1.7   PHOS 3.4 1.9*   ALB  --  3.7   TBILI  --  0.4   SGOT  --  15   ALT  --  23   INR  --  1.1     Lab Results   Component Value Date/Time    Glucose (POC) 167 (H) 04/19/2019 08:54 AM    Glucose (POC) 125 (H) 02/15/2014 07:36 PM     No results for input(s): PH, PCO2, PO2, HCO3, FIO2 in the last 72 hours.   Recent Labs     01/21/20  0601   INR 1.1     All Micro Results     Procedure Component Value Units Date/Time    RESPIRATORY PANEL,PCR,NASOPHARYNGEAL [[id number]]  (Abnormal) Collected:  01/21/20 0829    Order Status:  Completed Specimen:  Nasopharyngeal Updated:  01/21/20 1241     Adenovirus NOT DETECTED        Coronavirus 229E NOT DETECTED        Coronavirus HKU1 NOT DETECTED        Coronavirus CVNL63 NOT DETECTED        Coronavirus OC43 NOT DETECTED        Metapneumovirus NOT DETECTED        Rhinovirus and Enterovirus NOT DETECTED        Influenza A NOT DETECTED        Influenza A, subtype H1 NOT DETECTED        Influenza A, subtype H3 NOT DETECTED        INFLUENZA A H1N1 PCR NOT DETECTED        Influenza B DETECTED        Parainfluenza 1 NOT DETECTED        Parainfluenza 2 NOT DETECTED        Parainfluenza 3 NOT DETECTED        Parainfluenza virus 4 NOT DETECTED        RSV by PCR NOT DETECTED        B. parapertussis, PCR NOT DETECTED        Bordetella pertussis - PCR NOT DETECTED        Chlamydophila pneumoniae DNA, QL, PCR NOT DETECTED        Mycoplasma pneumoniae DNA, QL, PCR NOT DETECTED       LEGIONELLA PNEUMOPHILA AG, URINE [[id number]] Collected:  01/21/20 1213    Order Status:  Completed Specimen:  Urine Updated:  01/21/20 1225    KENNETH Richardson, UR/CSF [[id number]] Collected:  01/21/20 1213    Order Status:  Completed Updated:  01/21/20 1225    INFLUENZA A & B AG (RAPID TEST) [[id number]]     Order Status:  Canceled Specimen:  Nasopharyngeal           I have reviewed notes of prior 24hr. Other pertinent lab:      Total time spent with patient: [place] [address] discussed with: Patient, Nursing Staff and >50% of time spent in counseling and coordination of care    Discussed:  Care Plan    Prophylaxis:  Lovenox    Disposition:  Home w/Family           ___________________________________________________    Attending Physician: Rajeev Lofton MD

## 2020-01-23 LAB
CHOLEST SERPL-MCNC: 112 MG/DL
COMMENT, HOLDF: NORMAL
FLUID CULTURE, SPNG2: NORMAL
HDLC SERPL-MCNC: 64 MG/DL
HDLC SERPL: 1.8 {RATIO} (ref 0–5)
L PNEUMO1 AG UR QL IA: NEGATIVE
LDLC SERPL CALC-MCNC: 37.8 MG/DL (ref 0–100)
LIPID PROFILE,FLP: NORMAL
ORGANISM ID, SPNG3: NORMAL
PLEASE NOTE, SPNG4: NORMAL
S PNEUM AG SPEC QL LA: NEGATIVE
SAMPLES BEING HELD,HOLD: NORMAL
SPECIMEN SOURCE: NORMAL
SPECIMEN SOURCE: NORMAL
SPECIMEN, SPNG1: NORMAL
TRIGL SERPL-MCNC: 51 MG/DL (ref ?–150)
VLDLC SERPL CALC-MCNC: 10.2 MG/DL

## 2020-01-23 PROCEDURE — 36415 COLL VENOUS BLD VENIPUNCTURE: CPT

## 2020-01-23 PROCEDURE — 90935 HEMODIALYSIS ONE EVALUATION: CPT

## 2020-01-23 PROCEDURE — 74011000258 HC RX REV CODE- 258: Performed by: INTERNAL MEDICINE

## 2020-01-23 PROCEDURE — 74011250637 HC RX REV CODE- 250/637: Performed by: INTERNAL MEDICINE

## 2020-01-23 PROCEDURE — 74011250636 HC RX REV CODE- 250/636: Performed by: INTERNAL MEDICINE

## 2020-01-23 PROCEDURE — 80061 LIPID PANEL: CPT

## 2020-01-23 PROCEDURE — 74011250637 HC RX REV CODE- 250/637: Performed by: NURSE PRACTITIONER

## 2020-01-23 PROCEDURE — 97116 GAIT TRAINING THERAPY: CPT

## 2020-01-23 PROCEDURE — 65660000000 HC RM CCU STEPDOWN

## 2020-01-23 RX ORDER — BENZONATATE 100 MG/1
100 CAPSULE ORAL
Status: DISCONTINUED | OUTPATIENT
Start: 2020-01-23 | End: 2020-01-24 | Stop reason: HOSPADM

## 2020-01-23 RX ADMIN — SODIUM CHLORIDE 75 ML/HR: 900 INJECTION, SOLUTION INTRAVENOUS at 13:16

## 2020-01-23 RX ADMIN — GUAIFENESIN AND CODEINE PHOSPHATE 10 ML: 100; 10 SOLUTION ORAL at 19:39

## 2020-01-23 RX ADMIN — ENOXAPARIN SODIUM 40 MG: 40 INJECTION SUBCUTANEOUS at 09:28

## 2020-01-23 RX ADMIN — Medication 10 ML: at 05:28

## 2020-01-23 RX ADMIN — ALPRAZOLAM 0.25 MG: 0.25 TABLET ORAL at 08:08

## 2020-01-23 RX ADMIN — Medication 10 ML: at 14:00

## 2020-01-23 RX ADMIN — OSELTAMIVIR PHOSPHATE 75 MG: 75 CAPSULE ORAL at 08:08

## 2020-01-23 RX ADMIN — GUAIFENESIN AND CODEINE PHOSPHATE 10 ML: 100; 10 SOLUTION ORAL at 06:04

## 2020-01-23 RX ADMIN — GUAIFENESIN AND CODEINE PHOSPHATE 10 ML: 100; 10 SOLUTION ORAL at 11:35

## 2020-01-23 RX ADMIN — CEFTRIAXONE SODIUM 1 G: 1 INJECTION, POWDER, FOR SOLUTION INTRAVENOUS at 11:35

## 2020-01-23 RX ADMIN — GUAIFENESIN AND CODEINE PHOSPHATE 10 ML: 100; 10 SOLUTION ORAL at 14:47

## 2020-01-23 RX ADMIN — ALPRAZOLAM 0.25 MG: 0.25 TABLET ORAL at 21:00

## 2020-01-23 RX ADMIN — OSELTAMIVIR PHOSPHATE 75 MG: 75 CAPSULE ORAL at 20:24

## 2020-01-23 RX ADMIN — BENZONATATE 100 MG: 100 CAPSULE ORAL at 09:28

## 2020-01-23 RX ADMIN — AMLODIPINE BESYLATE 5 MG: 5 TABLET ORAL at 08:08

## 2020-01-23 RX ADMIN — ATORVASTATIN CALCIUM 40 MG: 20 TABLET, FILM COATED ORAL at 21:00

## 2020-01-23 RX ADMIN — TAMSULOSIN HYDROCHLORIDE 0.4 MG: 0.4 CAPSULE ORAL at 20:24

## 2020-01-23 RX ADMIN — Medication 10 ML: at 21:01

## 2020-01-23 RX ADMIN — METRONIDAZOLE 500 MG: 500 INJECTION, SOLUTION INTRAVENOUS at 20:25

## 2020-01-23 RX ADMIN — TRAZODONE HYDROCHLORIDE 50 MG: 50 TABLET ORAL at 21:00

## 2020-01-23 RX ADMIN — AZITHROMYCIN MONOHYDRATE 500 MG: 500 INJECTION, POWDER, LYOPHILIZED, FOR SOLUTION INTRAVENOUS at 13:16

## 2020-01-23 RX ADMIN — PANTOPRAZOLE SODIUM 40 MG: 40 TABLET, DELAYED RELEASE ORAL at 17:39

## 2020-01-23 RX ADMIN — ALPRAZOLAM 0.25 MG: 0.25 TABLET ORAL at 17:39

## 2020-01-23 RX ADMIN — BENZONATATE 100 MG: 100 CAPSULE ORAL at 17:52

## 2020-01-23 RX ADMIN — PANTOPRAZOLE SODIUM 40 MG: 40 TABLET, DELAYED RELEASE ORAL at 05:30

## 2020-01-23 RX ADMIN — ASPIRIN 81 MG 81 MG: 81 TABLET ORAL at 08:08

## 2020-01-23 RX ADMIN — METRONIDAZOLE 500 MG: 500 INJECTION, SOLUTION INTRAVENOUS at 08:09

## 2020-01-23 NOTE — PROGRESS NOTES
CM follow up:    Patient remains on IV antibiotics. EEG noted to be WNL. PT evaluation - needs identified. CM will continue to follow.     Michelle Burns RN, MSN/Care manager

## 2020-01-23 NOTE — PROGRESS NOTES
Bedside and Verbal shift change report given to Janessa (oncoming nurse) by David Killian (offgoing nurse). Report included the following information SBAR, Kardex and Cardiac Rhythm NSR/sinus leonidas. 0740 patient coughing and experiencing pain in chest when attempting to breath deeply. Lungs clear on auscultation with slight rhonchi. Pulse ox of 100 but patient SOB. Will place patient on 2L NC for comfort and speak to Dr. Rene Melissa about ordering tessalon pearls for patient. Will reassess. 0900 patients WOB is much better with 2L NC on, will leave oxygen on temporarily, coughing is already starting to subside. Dr. Rene Melissa is aware patient has 2L NC on and ordered tessalon pearls for patient. 1100 patient breathing normally now, no issues, just states \"I just feel weaker than yesterday, yesterday I had a good day and today I just dont feel as well\"    1440 patient threw up \"medium amount\" per patient, RN did not see, patient flushed down toilet. Will continue to monitor patient. Patient given ginger ale and saltines for now, will also give patient more cough syrup and notify Dr. Rene Melissa. 1700 patient feeling better, VS stable, given gingerale. Bedside and Verbal shift change report given to Dolores(oncoming nurse) by James Tomas (offgoing nurse). Report included the following information SBAR, Kardex and Cardiac Rhythm sinus leonidas.

## 2020-01-23 NOTE — PROGRESS NOTES
Problem: Mobility Impaired (Adult and Pediatric)  Goal: *Acute Goals and Plan of Care (Insert Text)  Description  FUNCTIONAL STATUS PRIOR TO ADMISSION: Patient was independent and active without use of DME.    HOME SUPPORT PRIOR TO ADMISSION: The patient lived with wife but did not require assist.    Physical Therapy Goals  Initiated 1/21/2020     1. Patient will transfer from bed to chair and chair to bed with independence using the least restrictive device within 7 day(s). 2.  Patient will perform sit to stand with independence within 7 day(s). 3.  Patient will ambulate with independence for 250 feet with the least restrictive device within 7 day(s). 4.  Patient will ascend/descend 2 stairs with 1 handrail(s) with supervision/set-up within 7 day(s). Outcome: Progressing Towards Goal  Note:   PHYSICAL THERAPY TREATMENT  Patient: Lenora Rawls (17 y.o. male)  Date: 1/23/2020  Diagnosis: Syncope [R55]  Syncope [R55]   <principal problem not specified>       Precautions:    Chart, physical therapy assessment, plan of care and goals were reviewed. ASSESSMENT  Patient continues with skilled PT services and progressing towards goals. Not feeling as good today. Independent with mobility tasks. Reports intermittent \"waves\" of dizziness during session, with activity , requiring standing rest break and use of sanjuana bar for steadying /69 and in sitting with BLE elevated, both episode spontaneously resolved within 30seconds. RN notified. Current Level of Function Impacting Discharge (mobility/balance): CGA/SBA    Other factors to consider for discharge: none         PLAN :  Patient continues to benefit from skilled intervention to address the above impairments. Continue treatment per established plan of care. to address goals.     Recommendation for discharge: (in order for the patient to meet his/her long term goals)  No skilled physical therapy/ follow up rehabilitation needs identified at this time.    This discharge recommendation:  Has been made in collaboration with the attending provider and/or case management    IF patient discharges home will need the following DME: none       SUBJECTIVE:   Patient stated don't feel as good today.     OBJECTIVE DATA SUMMARY:   Critical Behavior:  Neurologic State: Alert  Orientation Level: Oriented X4        Functional Mobility Training:  Bed Mobility:     Supine to Sit: Independent     Scooting: Independent        Transfers:  Sit to Stand: Modified independent  Stand to Sit: Modified independent                             Balance:  Sitting: Intact  Standing: Intact  Ambulation/Gait Training:  Distance (ft): 150 Feet (ft)  Assistive Device: Gait belt  Ambulation - Level of Assistance: Stand-by assistance        Gait Abnormalities: Altered arm swing;Path deviations        Base of Support: Narrowed     Speed/Darlene: Fluctuations                       Stairs: Therapeutic Exercises:     Pain Rating:  Denies pain    Activity Tolerance:   Good  Please refer to the flowsheet for vital signs taken during this treatment.     After treatment patient left in no apparent distress:   Sitting in chair, Call bell within reach, and Caregiver / family present    COMMUNICATION/COLLABORATION:   The patients plan of care was discussed with: Registered Nurse Bhavin Gonzalez   Time Calculation: 25 mins

## 2020-01-23 NOTE — PROGRESS NOTES
Rick Chavira [facility] [address]  [address] [facility], [place], [id number] [place] [address]  [phone]      Medical Progress Note      NAME: Kumar Clark   :  1940  MRM:  [id number]    Date/Time: 2020  8:31 AM       Assessment and Plan:   1. Syncope (2020) vs SZD. Patient felt dizzy before his syncopal episode. According to his wife his eyes rolled and had foaming in his mouth. when he gained consciousness, he became confused. Had another episode her yesterday when he was about to transfer to wheelchair. No orthostatic hypotension. Echocardiogram reviewed. Monitor clinically. check EEG and consult neurology. CT of the head was unremarkable. EKG is unremarkable. 2.  CAP/ influenzae B. CTA of the chest: Right lower lobe pneumonia versus alveolitis. Continue ABx, pneumonia workup, check sputum for gram stain. On tamiflu. Check BC     3. HTN (hypertension), benign (2/15/2014) continue home medications.      4.  Esophageal reflux (2/15/2014) continue PPI.      5. CAD s/p CABG/left hyperlipidemia (2/15/2014). Continue aspirin and statin     6. BPH (benign prostatic hyperplasia) (2020). Continue Flomax     7. Anxiety (2020). Continue home medications     8. Positive D dimer (2020). Checking CTA of the chest is without PE and BL leg doppler is negative.      9.  Esophageal thickening on CT scan. Evaluated by GI and recommended outpatient FU. 10.  Bradycardia. Consult cardiology          Subjective:     Chief Complaint[de-identified] Patient was seen and examined as a follow up for syncope. Chart was reviewed. c/o severe cough     ROS:  (bold if positive, if negative)    Tolerating PT  Tolerating Diet        Objective:     Last 24hrs VS reviewed since prior progress note.  Most recent are:    Visit Vitals  /76 (BP 1 Location: Right arm, BP Patient Position: At rest)   Pulse (!) 53   Temp 97.8 °F (36.6 °C)   Resp 18   Ht 5' 7\" (1.702 m)   Wt 65.8 kg (145 lb)   SpO2 97% BMI 22.71 kg/m²     SpO2 Readings from Last 6 Encounters:   01/23/20 97%   10/15/19 95%   04/19/19 99%   02/17/14 93%            Intake/Output Summary (Last 24 hours) at 1/23/2020 0810  Last data filed at 1/23/2020 3113  Gross per 24 hour   Intake 2007.5 ml   Output 950 ml   Net 1057.5 ml        Physical Exam:    Gen:  Well-developed, well-nourished, in no acute distress  HEENT:  Pink conjunctivae, PERRL, hearing intact to voice, moist mucous membranes  Neck:  Supple, without masses, thyroid non-tender  Resp:  No accessory muscle use, clear breath sounds without wheezes rales or rhonchi  Card:  No murmurs, normal S1, S2 without thrills, bruits or peripheral edema  Abd:  Soft, non-tender, non-distended, normoactive bowel sounds are present, no palpable organomegaly and no detectable hernias  Lymph:  No cervical or inguinal adenopathy  Musc:  No cyanosis or clubbing  Skin:  No rashes or ulcers, skin turgor is good  Neuro:  Cranial nerves are grossly intact, no focal motor weakness, follows commands appropriately  Psych:  Good insight, oriented to person, place and time, alert  __________________________________________________________________  Medications Reviewed: (see below)  Medications:     Current Facility-Administered Medications   Medication Dose Route Frequency    benzonatate (TESSALON) capsule 100 mg  100 mg Oral TID PRN    guaiFENesin-codeine (ROBITUSSIN AC) 100-10 mg/5 mL solution 10 mL  10 mL Oral Q4H PRN    sodium chloride (NS) flush 5-40 mL  5-40 mL IntraVENous Q8H    sodium chloride (NS) flush 5-40 mL  5-40 mL IntraVENous PRN    0.9% sodium chloride infusion  75 mL/hr IntraVENous CONTINUOUS    enoxaparin (LOVENOX) injection 40 mg  40 mg SubCUTAneous Q24H    cefTRIAXone (ROCEPHIN) 1 g in 0.9% sodium chloride (MBP/ADV) 50 mL ADV  1 g IntraVENous Q24H    azithromycin (ZITHROMAX) 500 mg in 0.9% sodium chloride (MBP/ADV) 250 mL  500 mg IntraVENous Q24H    metroNIDAZOLE (FLAGYL) IVPB premix 500 mg 500 mg IntraVENous Q12H    ALPRAZolam (XANAX) tablet 0.25 mg  0.25 mg Oral TID    amLODIPine (NORVASC) tablet 5 mg  5 mg Oral DAILY AFTER BREAKFAST    aspirin chewable tablet 81 mg  81 mg Oral DAILY    atorvastatin (LIPITOR) tablet 40 mg  40 mg Oral QHS    tamsulosin (FLOMAX) capsule 0.4 mg  0.4 mg Oral QHS    traZODone (DESYREL) tablet 50 mg  50 mg Oral QHS    oseltamivir (TAMIFLU) capsule 75 mg  75 mg Oral Q12H    pantoprazole (PROTONIX) tablet 40 mg  40 mg Oral ACB&D        Lab Data Reviewed: (see below)  Lab Review:     Recent Labs     01/22/20  0311 01/21/20  0601   WBC 7.1 7.4   HGB 13.5 14.0   HCT 39.8 41.1    153     Recent Labs     01/22/20  0311 01/21/20  0601    140   K 3.9 3.6    108   CO2 26 26   * 129*   BUN 18 18   CREA 0.86 0.99   CA 8.5 8.4*   MG  --  1.7   PHOS 3.4 1.9*   ALB  --  3.7   TBILI  --  0.4   SGOT  --  15   ALT  --  23   INR  --  1.1     Lab Results   Component Value Date/Time    Glucose (POC) 167 (H) 04/19/2019 08:54 AM    Glucose (POC) 125 (H) 02/15/2014 07:36 PM     No results for input(s): PH, PCO2, PO2, HCO3, FIO2 in the last 72 hours.   Recent Labs     01/21/20  0601   INR 1.1     All Micro Results     Procedure Component Value Units Date/Time    RESPIRATORY PANEL,PCR,NASOPHARYNGEAL [[id number]]  (Abnormal) Collected:  01/21/20 0829    Order Status:  Completed Specimen:  Nasopharyngeal Updated:  01/21/20 1241     Adenovirus NOT DETECTED        Coronavirus 229E NOT DETECTED        Coronavirus HKU1 NOT DETECTED        Coronavirus CVNL63 NOT DETECTED        Coronavirus OC43 NOT DETECTED        Metapneumovirus NOT DETECTED        Rhinovirus and Enterovirus NOT DETECTED        Influenza A NOT DETECTED        Influenza A, subtype H1 NOT DETECTED        Influenza A, subtype H3 NOT DETECTED        INFLUENZA A H1N1 PCR NOT DETECTED        Influenza B DETECTED        Parainfluenza 1 NOT DETECTED        Parainfluenza 2 NOT DETECTED        Parainfluenza 3 NOT DETECTED        Parainfluenza virus 4 NOT DETECTED        RSV by PCR NOT DETECTED        B. parapertussis, PCR NOT DETECTED        Bordetella pertussis - PCR NOT DETECTED        Chlamydophila pneumoniae DNA, QL, PCR NOT DETECTED        Mycoplasma pneumoniae DNA, QL, PCR NOT DETECTED       LEGIONELLA PNEUMOPHILA AG, URINE [[id number]] Collected:  01/21/20 1213    Order Status:  Completed Specimen:  Urine Updated:  01/21/20 1225    KENNETH Wade, UR/CSF [[id number]] Collected:  01/21/20 1213    Order Status:  Completed Updated:  01/21/20 1225    INFLUENZA A & B AG (RAPID TEST) [[id number]]     Order Status:  Canceled Specimen:  Nasopharyngeal           I have reviewed notes of prior 24hr. Other pertinent lab:      Total time spent with patient: [place] 59 discussed with: Patient, Nursing Staff and >50% of time spent in counseling and coordination of care    Discussed:  Care Plan    Prophylaxis:  Lovenox    Disposition:  Home w/Family           ___________________________________________________    Attending Physician: Renay Flores MD

## 2020-01-23 NOTE — CONSULTS
Horacio Mccain MD, [address] [place] [address]  Harvey Chavez 34  [phone]    Date of  Admission: 1/21/2020  5:51 AM         IMPRESSION and RECOMMENDATIONS     1. Syncope:  Unclear etiology. Tends to bradycardia but exercises daily. Echo and tele unremarkable. No evidence of ischemia or CHF. I think he is stable for discharge from a cardiac standpoint. I, however, feel he should see his primary cardiologist, Dr. Michele Brown, sooner rather than later to consider an event monitor to assess for bradydysrhythmias. No further cardiac testing planned at this time. I have discussed this plan with the patient. He appears to understand this plan and wishes to proceed ahead. 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 male with the above problem list who was admitted for Syncope [R55]  Syncope [R55]. Presented with syncope while walking from kitchen. Preceded by lightheadedness. Passed out. Noted to have poss vomitus. EMS summoned.   Admitted for this as well as flu. Notes recent cough. He denies chest pain/discomfort, shortness of breath, dyspnea on exertion, orthopnea, paroxysmal noctural dyspnea, lower extremity edema, palpitations, syncope, or near-syncope.     Current Facility-Administered Medications   Medication Dose Route Frequency    benzonatate (TESSALON) capsule 100 mg  100 mg Oral TID PRN    guaiFENesin-codeine (ROBITUSSIN AC) 100-10 mg/5 mL solution 10 mL  10 mL Oral Q4H PRN    sodium chloride (NS) flush 5-40 mL  5-40 mL IntraVENous Q8H    sodium chloride (NS) flush 5-40 mL  5-40 mL IntraVENous PRN    0.9% sodium chloride infusion  75 mL/hr IntraVENous CONTINUOUS    enoxaparin (LOVENOX) injection 40 mg  40 mg SubCUTAneous Q24H    cefTRIAXone (ROCEPHIN) 1 g in 0.9% sodium chloride (MBP/ADV) 50 mL ADV  1 g IntraVENous Q24H    azithromycin (ZITHROMAX) 500 mg in 0.9% sodium chloride (MBP/ADV) 250 mL  500 mg IntraVENous Q24H    metroNIDAZOLE (FLAGYL) IVPB premix 500 mg  500 mg IntraVENous Q12H    ALPRAZolam (XANAX) tablet 0.25 mg  0.25 mg Oral TID    amLODIPine (NORVASC) tablet 5 mg  5 mg Oral DAILY AFTER BREAKFAST    aspirin chewable tablet 81 mg  81 mg Oral DAILY    atorvastatin (LIPITOR) tablet 40 mg  40 mg Oral QHS    tamsulosin (FLOMAX) capsule 0.4 mg  0.4 mg Oral QHS    traZODone (DESYREL) tablet 50 mg  50 mg Oral QHS    oseltamivir (TAMIFLU) capsule 75 mg  75 mg Oral Q12H    pantoprazole (PROTONIX) tablet 40 mg  40 mg Oral ACB&D      Allergies   Allergen Reactions    Iodinated Contrast Media Shortness of Breath     Lung aspiration  Short of breath      Family History   Problem Relation Age of Onset    Cancer Mother         COLON    Cancer Father         BRAIN    Heart Disease Brother     Stroke Neg Hx     Anesth Problems Neg Hx       Social History     Socioeconomic History    Marital status:      Spouse name: Not on file    Number of children: Not on file    Years of education: Not on file   Luis Duarte education level: Not on file   Occupational History    Not on file   Social Needs    Financial resource strain: Not on file    Food insecurity:     Worry: Not on file     Inability: Not on file    Transportation needs:     Medical: Not on file     Non-medical: Not on file   Tobacco Use    Smoking status: Former Smoker     Packs/day: 1.00     Years: 20.00     Pack years: 20.00     Last attempt to quit:      Years since quittin.0    Smokeless tobacco: Never Used   Substance and Sexual Activity    Alcohol use: Yes     Alcohol/week: 7.0 standard drinks     Types: 7 Glasses of wine per week    Drug use: No    Sexual activity: Not on file   Lifestyle    Physical activity:     Days per week: Not on file     Minutes per session: Not on file    Stress: Not on file   Relationships    Social connections:     Talks on phone: Not on file     Gets together: Not on file     Attends [employer] service: Not on file     Active member of club or organization: Not on file     Attends meetings of clubs or organizations: Not on file     Relationship status: Not on file    Intimate partner violence:     Fear of current or ex partner: Not on file     Emotionally abused: Not on file     Physically abused: Not on file     Forced sexual activity: Not on file   Other Topics Concern    Not on file   Social History Narrative    Not on file       Physical Exam:     Patient Vitals for the past 16 hrs:   BP Temp Pulse Resp SpO2   20 0801 136/76 97.8 °F (36.6 °C) (!) 53 18 97 %   20 0517 107/58 97.6 °F (36.4 °C) (!) 53 16 97 %   20 0031 124/70 97.9 °F (36.6 °C) (!) 46 16 95 %   20 2337   (!) 52     20 2246 125/71 97.9 °F (36.6 °C) (!) 49 16 94 %   20 1947 127/71 97.9 °F (36.6 °C) (!) 59 16 95 %       HEENT Exam:     Normocephalic, atraumatic. EOMI. Oropharynx negative. Neck supple. No lymphadenopathy. Lung Exam:     The patient is not dyspneic. There is no cough.  Breath sounds are heard equally in all lung fields. There are no wheezes, rales, rhonchi, or rubs heard on auscultation. Heart Exam:     The rhythm is regular. The PMI is in the 5th intercostal space of the MCL. Apical impulse is normal. S1 is regular. S2 is physiologic. There is no S3, S4 gallop, murmur, click, or rub. Abdomen Exam:     Bowel sounds are normoactive. Abdomen soft in all quadrants. No tenderness. No palpable masses. No organomegaly. No hernias noted. No bruits or pulsatile mass. Extremities Exam:     The extremities are atraumatic appearing. There is no clubbing, cyanosis, edema, ulcers, varicose veins, rash, erythemia noted in the extremities. The neurovascular status is grossly intact with normal distal sensation and pulses. Vascular Exam:     The radial, brachial, dorsalis pedis, posterior tibial, are equal and strong bilaterally The carotids are equal bilaterally without bruits. Labs:     Lab Results   Component Value Date/Time    Glucose 126 (H) 01/22/2020 03:11 AM    Sodium 139 01/22/2020 03:11 AM    Potassium 3.9 01/22/2020 03:11 AM    Chloride 108 01/22/2020 03:11 AM    CO2 26 01/22/2020 03:11 AM    BUN 18 01/22/2020 03:11 AM    Creatinine 0.86 01/22/2020 03:11 AM    Calcium 8.5 01/22/2020 03:11 AM     Recent Labs     01/22/20  0311 01/21/20  0601   WBC 7.1 7.4   HGB 13.5 14.0   HCT 39.8 41.1    153     Recent Labs     01/21/20  0601   SGOT 15   ALT 23   AP 73   TBILI 0.4   TP 6.2*   ALB 3.7   GLOB 2.5     Recent Labs     01/21/20  0601   INR 1.1   PTP 10.8   APTT 27.3      No results for input(s): CPK, CKMB, TNIPOC in the last 72 hours.     No lab exists for component: Donzella Chol  Recent Labs     01/21/20  0601   TROIQ <0.05     Lab Results   Component Value Date/Time    Cholesterol, total 112 01/23/2020 05:27 AM    HDL Cholesterol 64 01/23/2020 05:27 AM    LDL, calculated 37.8 01/23/2020 05:27 AM    Triglyceride 51 01/23/2020 05:27 AM    CHOL/HDL Ratio 1.8 01/23/2020 05:27 AM       EKG:  NSR @ 80

## 2020-01-23 NOTE — ROUTINE PROCESS
3439 
Bedside and Verbal shift change report given to Janessa (oncoming nurse) by Leslee Palafox (offgoing nurse). Report included the following information SBAR, Kardex, ED Summary, Procedure Summary, Intake/Output, MAR, Recent Results and Cardiac Rhythm Sinus Opal Villasenor.

## 2020-01-24 VITALS
WEIGHT: 145 LBS | RESPIRATION RATE: 16 BRPM | SYSTOLIC BLOOD PRESSURE: 129 MMHG | TEMPERATURE: 97.6 F | BODY MASS INDEX: 22.76 KG/M2 | HEART RATE: 51 BPM | OXYGEN SATURATION: 97 % | HEIGHT: 67 IN | DIASTOLIC BLOOD PRESSURE: 70 MMHG

## 2020-01-24 PROBLEM — J11.1 INFLUENZA: Status: ACTIVE | Noted: 2020-01-24

## 2020-01-24 LAB
LEFT CCA DIST DIAS: 11 CM/S
LEFT CCA DIST SYS: 59.8 CM/S
LEFT CCA PROX DIAS: 11.9 CM/S
LEFT CCA PROX SYS: 80.8 CM/S
LEFT ECA DIAS: 7.25 CM/S
LEFT ECA SYS: 86.4 CM/S
LEFT ICA DIST DIAS: 12.1 CM/S
LEFT ICA DIST SYS: 41.9 CM/S
LEFT ICA MID DIAS: 15 CM/S
LEFT ICA MID SYS: 54.3 CM/S
LEFT ICA PROX DIAS: 10.4 CM/S
LEFT ICA PROX SYS: 52.4 CM/S
LEFT ICA/CCA SYS: 0.91
LEFT SUBCLAVIAN DIAS: 0 CM/S
LEFT SUBCLAVIAN SYS: 146.3 CM/S
LEFT VERTEBRAL DIAS: 6.39 CM/S
LEFT VERTEBRAL SYS: 33.1 CM/S
RIGHT CCA DIST DIAS: 10.7 CM/S
RIGHT CCA DIST SYS: 104.3 CM/S
RIGHT CCA PROX DIAS: 6 CM/S
RIGHT CCA PROX SYS: 194.3 CM/S
RIGHT ECA DIAS: 5.81 CM/S
RIGHT ECA SYS: 80.1 CM/S
RIGHT ICA DIST DIAS: 10.7 CM/S
RIGHT ICA DIST SYS: 47.6 CM/S
RIGHT ICA MID DIAS: 18 CM/S
RIGHT ICA MID SYS: 78.2 CM/S
RIGHT ICA PROX DIAS: 12.4 CM/S
RIGHT ICA PROX SYS: 47.5 CM/S
RIGHT ICA/CCA SYS: 0.8
RIGHT SUBCLAVIAN DIAS: 10.86 CM/S
RIGHT SUBCLAVIAN SYS: 186.4 CM/S
RIGHT VERTEBRAL DIAS: 5.26 CM/S
RIGHT VERTEBRAL SYS: 36.5 CM/S

## 2020-01-24 PROCEDURE — 74011250636 HC RX REV CODE- 250/636: Performed by: INTERNAL MEDICINE

## 2020-01-24 PROCEDURE — 74011250637 HC RX REV CODE- 250/637: Performed by: INTERNAL MEDICINE

## 2020-01-24 PROCEDURE — 74011250637 HC RX REV CODE- 250/637: Performed by: NURSE PRACTITIONER

## 2020-01-24 RX ORDER — OSELTAMIVIR PHOSPHATE 75 MG/1
75 CAPSULE ORAL EVERY 12 HOURS
Qty: 6 CAP | Refills: 0 | Status: SHIPPED | OUTPATIENT
Start: 2020-01-24 | End: 2020-01-27

## 2020-01-24 RX ORDER — DOXYCYCLINE 100 MG/1
100 CAPSULE ORAL 2 TIMES DAILY
Qty: 10 CAP | Refills: 0 | Status: SHIPPED | OUTPATIENT
Start: 2020-01-24 | End: 2020-02-10 | Stop reason: ALTCHOICE

## 2020-01-24 RX ORDER — BENZONATATE 100 MG/1
100 CAPSULE ORAL
Qty: 20 CAP | Refills: 0 | Status: SHIPPED | OUTPATIENT
Start: 2020-01-24 | End: 2020-01-31

## 2020-01-24 RX ORDER — AMOXICILLIN AND CLAVULANATE POTASSIUM 500; 125 MG/1; MG/1
1 TABLET, FILM COATED ORAL 2 TIMES DAILY
Qty: 10 TAB | Refills: 0 | Status: SHIPPED | OUTPATIENT
Start: 2020-01-24 | End: 2020-02-10 | Stop reason: ALTCHOICE

## 2020-01-24 RX ADMIN — Medication 10 ML: at 07:02

## 2020-01-24 RX ADMIN — ALPRAZOLAM 0.25 MG: 0.25 TABLET ORAL at 09:38

## 2020-01-24 RX ADMIN — GUAIFENESIN AND CODEINE PHOSPHATE 10 ML: 100; 10 SOLUTION ORAL at 05:03

## 2020-01-24 RX ADMIN — ENOXAPARIN SODIUM 40 MG: 40 INJECTION SUBCUTANEOUS at 09:38

## 2020-01-24 RX ADMIN — METRONIDAZOLE 500 MG: 500 INJECTION, SOLUTION INTRAVENOUS at 09:39

## 2020-01-24 RX ADMIN — OSELTAMIVIR PHOSPHATE 75 MG: 75 CAPSULE ORAL at 09:38

## 2020-01-24 RX ADMIN — SODIUM CHLORIDE 75 ML/HR: 900 INJECTION, SOLUTION INTRAVENOUS at 04:57

## 2020-01-24 RX ADMIN — ASPIRIN 81 MG 81 MG: 81 TABLET ORAL at 09:38

## 2020-01-24 RX ADMIN — AMLODIPINE BESYLATE 5 MG: 5 TABLET ORAL at 09:38

## 2020-01-24 RX ADMIN — PANTOPRAZOLE SODIUM 40 MG: 40 TABLET, DELAYED RELEASE ORAL at 07:02

## 2020-01-24 NOTE — PROGRESS NOTES
Rick Paredeselsen [facility]  [address] [facility], [address] [place] [address]  [phone]      Medical Progress Note      NAME: Jeison Hi   :  1940  MRM:  [id number]    Date/Time: 2020  8:31 AM       Assessment and Plan:   1. Syncope (2020) vs SZD. Patient felt dizzy before his syncopal episode. According to his wife his eyes rolled and had foaming in his mouth. when he gained consciousness, he became confused. Had another episode her yesterday when he was about to transfer to wheelchair. No orthostatic hypotension. Echocardiogram reviewed. Monitor clinically. check EEG and consult neurology. CT of the head was unremarkable. EKG is unremarkable. 2.  CAP/ influenzae B. CTA of the chest: Right lower lobe pneumonia versus alveolitis. Continue ABx, pneumonia workup, check sputum for gram stain. On tamiflu. Check BC     3. HTN (hypertension), benign (2/15/2014) continue home medications.      4.  Esophageal reflux (2/15/2014) continue PPI.      5. CAD s/p CABG/left hyperlipidemia (2/15/2014). Continue aspirin and statin     6. BPH (benign prostatic hyperplasia) (2020). Continue Flomax     7. Anxiety (2020). Continue home medications     8. Positive D dimer (2020). Checking CTA of the chest is without PE and BL leg doppler is negative.      9.  Esophageal thickening on CT scan. Evaluated by GI and recommended outpatient FU. 10.  Bradycardia. evaluated by Cardiology and recommended to FU with his cardiologist for possible event monitor            Subjective:     Chief Complaint[de-identified] Patient was seen and examined as a follow up for syncope. Chart was reviewed. fells better      ROS:  (bold if positive, if negative)    Tolerating PT  Tolerating Diet        Objective:     Last 24hrs VS reviewed since prior progress note.  Most recent are:    Visit Vitals  /70 (BP 1 Location: Right arm, BP Patient Position: At rest)   Pulse (!) 51   Temp 97.6 °F (36.4 °C)   Resp 16   Ht 5' 7\" (1.702 m)   Wt 65.8 kg (145 lb)   SpO2 97%   BMI 22.71 kg/m²     SpO2 Readings from Last 6 Encounters:   01/24/20 97%   10/15/19 95%   04/19/19 99%   02/17/14 93%    O2 Flow Rate (L/min): 2 l/min       Intake/Output Summary (Last 24 hours) at 1/24/2020 0432  Last data filed at 1/24/2020 0446  Gross per 24 hour   Intake 3532.5 ml   Output 2300 ml   Net 1232.5 ml        Physical Exam:    Gen:  Well-developed, well-nourished, in no acute distress  HEENT:  Pink conjunctivae, PERRL, hearing intact to voice, moist mucous membranes  Neck:  Supple, without masses, thyroid non-tender  Resp:  No accessory muscle use, clear breath sounds without wheezes rales or rhonchi  Card:  No murmurs, normal S1, S2 without thrills, bruits or peripheral edema  Abd:  Soft, non-tender, non-distended, normoactive bowel sounds are present, no palpable organomegaly and no detectable hernias  Lymph:  No cervical or inguinal adenopathy  Musc:  No cyanosis or clubbing  Skin:  No rashes or ulcers, skin turgor is good  Neuro:  Cranial nerves are grossly intact, no focal motor weakness, follows commands appropriately  Psych:  Good insight, oriented to person, place and time, alert  __________________________________________________________________  Medications Reviewed: (see below)  Medications:     Current Facility-Administered Medications   Medication Dose Route Frequency    benzonatate (TESSALON) capsule 100 mg  100 mg Oral TID PRN    guaiFENesin-codeine (ROBITUSSIN AC) 100-10 mg/5 mL solution 10 mL  10 mL Oral Q4H PRN    sodium chloride (NS) flush 5-40 mL  5-40 mL IntraVENous Q8H    sodium chloride (NS) flush 5-40 mL  5-40 mL IntraVENous PRN    0.9% sodium chloride infusion  75 mL/hr IntraVENous CONTINUOUS    enoxaparin (LOVENOX) injection 40 mg  40 mg SubCUTAneous Q24H    cefTRIAXone (ROCEPHIN) 1 g in 0.9% sodium chloride (MBP/ADV) 50 mL ADV  1 g IntraVENous Q24H    azithromycin (ZITHROMAX) 500 mg in 0.9% sodium chloride (MBP/ADV) 250 mL  500 mg IntraVENous Q24H    metroNIDAZOLE (FLAGYL) IVPB premix 500 mg  500 mg IntraVENous Q12H    ALPRAZolam (XANAX) tablet 0.25 mg  0.25 mg Oral TID    amLODIPine (NORVASC) tablet 5 mg  5 mg Oral DAILY AFTER BREAKFAST    aspirin chewable tablet 81 mg  81 mg Oral DAILY    atorvastatin (LIPITOR) tablet 40 mg  40 mg Oral QHS    tamsulosin (FLOMAX) capsule 0.4 mg  0.4 mg Oral QHS    traZODone (DESYREL) tablet 50 mg  50 mg Oral QHS    oseltamivir (TAMIFLU) capsule 75 mg  75 mg Oral Q12H    pantoprazole (PROTONIX) tablet 40 mg  40 mg Oral ACB&D        Lab Data Reviewed: (see below)  Lab Review:     Recent Labs     01/22/20 0311   WBC 7.1   HGB 13.5   HCT 39.8        Recent Labs     01/22/20 0311      K 3.9      CO2 26   *   BUN 18   CREA 0.86   CA 8.5   PHOS 3.4     Lab Results   Component Value Date/Time    Glucose (POC) 167 (H) 04/19/2019 08:54 AM    Glucose (POC) 125 (H) 02/15/2014 07:36 PM     No results for input(s): PH, PCO2, PO2, HCO3, FIO2 in the last 72 hours. No results for input(s): INR, INREXT, INREXT in the last 72 hours. All Micro Results     Procedure Component Value Units Date/Time    [facility] URINE [[id number]] Collected:  01/21/20 1213    Order Status:  Completed Specimen:  Urine Updated:  01/23/20 1536     Source URINE        L pneumophila S1 Ag, urine NEGATIVE         Comment: (NOTE)  Presumptive negative for L. pneumophila serogroup 1 antigen in urine,  suggesting no recent or current infection. Legionnaires' disease  cannot be ruled out since other serogroups and species may also  cause disease. Performed At: [address] [id number]  Rosalina RICARDO:[id number]         KENNETH Carranza, UR/CSF [[id number]] Collected:  01/21/20 1213    Order Status:  Completed Specimen:  Miscellaneous sample Updated:  01/23/20 1236     Source URINE        Specimen Urine     Streptococcus pneumoniae Ag NEGATIVE         Fluid culture Not indicated. Organism ID Not indicated. Please note Comment        Comment: (NOTE)  College of [employer] standards require a culture to be  performed on CSF specimens submitted for bacterial antigen testing. (CAP [id number]) Urine specimens will not be cultured. Performed At: [address] [id number]  Yareli Henderson MD IH:[id number]         Herb Ware 20 [[id number]]  (Abnormal) Collected:  01/21/20 0829    Order Status:  Completed Specimen:  Nasopharyngeal Updated:  01/21/20 1241     Adenovirus NOT DETECTED        Coronavirus 229E NOT DETECTED        Coronavirus HKU1 NOT DETECTED        Coronavirus CVNL63 NOT DETECTED        Coronavirus OC43 NOT DETECTED        Metapneumovirus NOT DETECTED        Rhinovirus and Enterovirus NOT DETECTED        Influenza A NOT DETECTED        Influenza A, subtype H1 NOT DETECTED        Influenza A, subtype H3 NOT DETECTED        INFLUENZA A H1N1 PCR NOT DETECTED        Influenza B DETECTED        Parainfluenza 1 NOT DETECTED        Parainfluenza 2 NOT DETECTED        Parainfluenza 3 NOT DETECTED        Parainfluenza virus 4 NOT DETECTED        RSV by PCR NOT DETECTED        B. parapertussis, PCR NOT DETECTED        Bordetella pertussis - PCR NOT DETECTED        Chlamydophila pneumoniae DNA, QL, PCR NOT DETECTED        Mycoplasma pneumoniae DNA, QL, PCR NOT DETECTED       INFLUENZA A & B AG (RAPID TEST) [[id number]]     Order Status:  Canceled Specimen:  Nasopharyngeal           I have reviewed notes of prior 24hr. Other pertinent lab:      Total time spent with patient: [address] discussed with: Patient, Nursing Staff and >50% of time spent in counseling and coordination of care    Discussed:  Care Plan    Prophylaxis:  Lovenox    Disposition:  Home w/Family           ___________________________________________________    Attending Physician: Shekhar Rabago MD

## 2020-01-24 NOTE — PROGRESS NOTES
Attempted to make follow up appointment with PCP but office said that they do not allow third party scheduling. Only appointments request from Patient will be made by practice.

## 2020-01-24 NOTE — DISCHARGE SUMMARY
Hospitalist Discharge Summary     Patient ID:    Amy Ervin  [id number]  29 y.o.  1940    Admit date: 1/21/2020    Discharge date and time: 1/24/2020    Admission Diagnoses: Syncope [R55]  Syncope [R55]    Chronic Diagnoses:    Problem List as of 1/24/2020 Date Reviewed: 1/23/2020          Codes Class Noted - Resolved    Influenza ICD-10-CM: J11.1  ICD-9-CM: 487.1  1/24/2020 - Present        Syncope ICD-10-CM: R55  ICD-9-CM: 780.2  1/21/2020 - Present        BPH (benign prostatic hyperplasia) ICD-10-CM: N40.0  ICD-9-CM: 600.00  1/21/2020 - Present        Anxiety ICD-10-CM: F41.9  ICD-9-CM: 300.00  1/21/2020 - Present        Positive D dimer ICD-10-CM: R79.89  ICD-9-CM: 790.92  1/21/2020 - Present        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 ICD-10-CM: M75.121  ICD-9-CM: 727.61  10/15/2019 - Present        Chest pain, unspecified ICD-10-CM: R07.9  ICD-9-CM: 786.50  2/15/2014 - Present        Dizziness ICD-10-CM: R42  ICD-9-CM: 780.4  2/15/2014 - Present        Generalized weakness ICD-10-CM: R53.1  ICD-9-CM: 780.79  2/15/2014 - Present        HTN (hypertension), benign ICD-10-CM: I10  ICD-9-CM: 401.1  2/15/2014 - Present        Esophageal reflux ICD-10-CM: K21.9  ICD-9-CM: 530.81  2/15/2014 - Present        Hyperlipidemia ICD-10-CM: E78.5  ICD-9-CM: 272.4  2/15/2014 - Present              Discharge Medications:   Current Discharge Medication List      START taking these medications    Details   oseltamivir (TAMIFLU) 75 mg capsule Take 1 Cap by mouth every twelve (12) hours for 3 days. Qty: 6 Cap, Refills: 0      doxycycline (MONODOX) 100 mg capsule Take 1 Cap by mouth two (2) times a day. Qty: 10 Cap, Refills: 0         CONTINUE these medications which have NOT CHANGED    Details   testosterone cypionate (DEPOTESTOTERONE CYPIONATE) 200 mg/mL injection 200 mg by IntraMUSCular route See Admin Instructions.  Every 10 days      amLODIPine (NORVASC) 5 mg tablet Take 5 mg by mouth daily (after breakfast). tamsulosin (FLOMAX) 0.4 mg capsule Take 0.4 mg by mouth nightly. traZODone (DESYREL) 100 mg tablet Take 50 mg by mouth nightly. naproxen sodium (ALEVE PO) Take 1 Tab by mouth two (2) times a day. Indications: back pain, recent rotator cuff surgery      omeprazole (PRILOSEC) 40 mg capsule Take 40 mg by mouth daily (after breakfast). ALPRAZolam (XANAX) 0.25 mg tablet Take 0.25 mg by mouth three (3) times daily. aspirin 81 mg chewable tablet Take 81 mg by mouth daily. atorvastatin (LIPITOR) 40 mg tablet Take 40 mg by mouth nightly. Follow up Care:    1. Mable Harris MD in 1-2 weeks  2. Cardiology     Diet:  Cardiac Diet    Disposition:  Home. Advanced Directive:    Discharge Exam:  See today's note. CONSULTATIONS: Cardiology and Neurology    Significant Diagnostic Studies:   Recent Labs     01/22/20  0311   WBC 7.1   HGB 13.5   HCT 39.8        Recent Labs     01/22/20  0311      K 3.9      CO2 26   BUN 18   CREA 0.86   *   CA 8.5   PHOS 3.4     No results for input(s): SGOT, GPT, ALT, AP, TBIL, TBILI, TP, ALB, GLOB, GGT, AML, LPSE in the last 72 hours. No lab exists for component: AMYP, HLPSE  No results for input(s): INR, PTP, APTT, INREXT in the last 72 hours. No results for input(s): FE, TIBC, PSAT, FERR in the last 72 hours. No results for input(s): PH, PCO2, PO2 in the last 72 hours. No results for input(s): CPK, CKMB in the last 72 hours. No lab exists for component: TROPONINI  Lab Results   Component Value Date/Time    Glucose (POC) 167 (H) 04/19/2019 08:54 AM    Glucose (POC) 125 (H) 02/15/2014 07:36 PM             HOSPITAL COURSE & TREATMENT RENDERED:     1.  Syncope (1/21/2020) vs SZD.  unclear etiology. Patient felt dizzy before his syncopal episode.  According to his wife his eyes rolled and had foaming in his mouth. when he gained consciousness, he became confused. Had another episode her yesterday when he was about to transfer to wheelchair. No orthostatic hypotension. Echocardiogram reviewed. FU with neurology.   CT of the head was unremarkable.  EKG is unremarkable.       2. CAP/ influenzae B. CTA of the chest: Right lower lobe pneumonia versus alveolitis. Continue ABx, pneumonia workup, check sputum for gram stain. On tamiflu. Check BC      3.  HTN (hypertension), benign (2/15/2014) continue home medications.      4.  Esophageal reflux (2/15/2014) continue PPI.      5.  CAD s/p CABG/left hyperlipidemia (2/15/2014).   Continue aspirin and statin     6.  BPH (benign prostatic hyperplasia) (1/21/2020).   Continue Flomax     7.   Anxiety (1/21/2020).   Continue home medications     8.  Positive D dimer (1/21/2020).   Checking CTA of the chest is without PE and BL leg doppler is negative.      9.  Esophageal thickening on CT scan. Evaluated by GI and recommended outpatient FU.      10. Bradycardia. evaluated by Cardiology and recommended to FU with his cardiologist for possible event monitor            Discharged in improved condition.     Spent 35 minutes    Signed:  Yung Ng MD  1/24/2020  8:32 AM

## 2020-01-24 NOTE — PROGRESS NOTES
Dee Posadas MD, [address] [place] [address]  Harvey Chavez 34  [phone]      IMPRESSION and RECOMMENDATIONS      1. Syncope:  Unclear etiology. Tends to bradycardia but exercises daily. Echo and tele unremarkable. No evidence of ischemia or CHF. I think he is stable for discharge from a cardiac standpoint. I, however, feel he should see his primary cardiologist, Dr. Diaz Carrera, sooner rather than later to consider an event monitor to assess for bradydysrhythmias. No further cardiac testing planned at this time. I have discussed this plan with the patient. He appears to understand this plan and wishes to proceed ahead. Will sign off. Subjective:       Feels great. Objective:     Patient Vitals for the past 16 hrs:   BP Temp Pulse Resp SpO2   01/24/20 0807 129/70 97.6 °F (36.4 °C) (!) 51 16 97 %   01/24/20 0700   73     01/24/20 0422 114/64 98.1 °F (36.7 °C) (!) 54 18 92 %   01/23/20 2355 120/62 98.6 °F (37 °C) (!) 50 18 96 %   01/23/20 1928 130/67 98.5 °F (36.9 °C) 60 18 98 %       HEENT Exam:     WNL         Lung Exam:     The patient is not dyspneic. Breath sounds are heard equally in all lung fields. There are no wheezes, rales, rhonchi, or rubs heard on auscultation. Heart Exam:     The rhythm is regular. The PMI is in the 5th intercostal space of the MCL. Apical impulse is normal. S1 is regular. S2 is physiologic. There is no S3, S4 gallop, murmur, click, or rub. Abdomen Exam:     Benign. Extremities Exam:     No cyanosis, clubbing, edema. Distal pulses intact.            Lab Results   Component Value Date/Time    Glucose 126 (H) 01/22/2020 03:11 AM    Sodium 139 01/22/2020 03:11 AM    Potassium 3.9 01/22/2020 03:11 AM    Chloride 108 01/22/2020 03:11 AM    CO2 26 01/22/2020 03:11 AM    BUN 18 01/22/2020 03:11 AM    Creatinine 0.86 01/22/2020 03:11 AM    Calcium 8.5 01/22/2020 03:11 AM     Recent Labs     01/22/20  0311 WBC 7.1   HGB 13.5   HCT 39.8        No results for input(s): SGOT, GPT, ALT, AP, TBIL, TBILI, TP, ALB, GLOB, GGT in the last 72 hours. No results for input(s): INR, PTP, APTT, INREXT in the last 72 hours. No results for input(s): CPK, CKMB, TNIPOC in the last 72 hours. No lab exists for component: TROPONINI, ITNL  No results for input(s): TROIQ in the last 72 hours.   Lab Results   Component Value Date/Time    Cholesterol, total 112 01/23/2020 05:27 AM    HDL Cholesterol 64 01/23/2020 05:27 AM    LDL, calculated 37.8 01/23/2020 05:27 AM    Triglyceride 51 01/23/2020 05:27 AM    CHOL/HDL Ratio 1.8 01/23/2020 05:27 AM

## 2020-01-24 NOTE — DISCHARGE INSTRUCTIONS
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 ICD-10-CM: M75.121  ICD-9-CM: 727.61  10/15/2019 - Present        Chest pain, unspecified ICD-10-CM: R07.9  ICD-9-CM: 786.50  2/15/2014 - Present        Dizziness ICD-10-CM: R42  ICD-9-CM: 780.4  2/15/2014 - Present        Generalized weakness ICD-10-CM: R53.1  ICD-9-CM: 780.79  2/15/2014 - Present        HTN (hypertension), benign ICD-10-CM: I10  ICD-9-CM: 401.1  2/15/2014 - Present        Esophageal reflux ICD-10-CM: K21.9  ICD-9-CM: 530.81  2/15/2014 - Present        Hyperlipidemia ICD-10-CM: E78.5  ICD-9-CM: 272.4  2/15/2014 - Present              DISCHARGE MEDICATIONS:          · It is important that you take the medication exactly as they are prescribed. · Keep your medication in the bottles provided by the pharmacist and keep a list of the medication names, dosages, and times to be taken in your wallet. · Do not take other medications without consulting your doctor. DIET:  Cardiac Diet    ACTIVITY: Activity as tolerated    ADDITIONAL INFORMATION: If you experience any of the following symptoms then please call your primary care physician or return to the emergency room if you cannot get hold of your doctor: Fever, chills, nausea, vomiting, diarrhea, change in mentation, falling, bleeding, shortness of breath.     FOLLOW UP CARE:  Dr. Juan Post MD  you are to call and set up an appointment to see them in 2 weeks. Follow-up with neurology, Dr Abiodun Tolbert in 2 weeks    Follow-up with cardiology for possible event monitor      Follow-up with gastroenterologist, Dr Greg Gastelum or Saida [phone] obtained by :  I understand that if any problems occur once I am at home I am to contact my physician. I understand and acknowledge receipt of the instructions indicated above.                                                                                                                                            Physician's or R.N.'s Signature                                                                  Date/Time                                                                                                                                              Patient or Representative Signature                                                          Date/Time

## 2020-01-24 NOTE — PROGRESS NOTES
Problem: Risk for Spread of Infection  Goal: Prevent transmission of infectious organism to others  Description  Prevent the transmission of infectious organisms to other patients, staff members, and visitors. Outcome: Progressing Towards Goal     Problem: Patient Education:  Go to Education Activity  Goal: Patient/Family Education  Outcome: Progressing Towards Goal     Problem: Patient Education: Go to Patient Education Activity  Goal: Patient/Family Education  Outcome: Progressing Towards Goal     Problem: Falls - Risk of  Goal: *Absence of Falls  Description  Document Polonicola Alyceurbano Fall Risk and appropriate interventions in the flowsheet. Outcome: Progressing Towards Goal  Note: Fall Risk Interventions:            Medication Interventions: Patient to call before getting OOB, Teach patient to arise slowly         History of Falls Interventions:  Investigate reason for fall, Door open when patient unattended, Vital signs minimum Q4HRs X 24 hrs (comment for end date)         Problem: Patient Education: Go to Patient Education Activity  Goal: Patient/Family Education  Outcome: Progressing Towards Goal     Problem: Syncope  Goal: *Absence of injury  Outcome: Progressing Towards Goal     Problem: Syncope  Goal: Decrease or eliminate episodes of syncope  Outcome: Progressing Towards Goal     Problem: Patient Education: Go to Patient Education Activity  Goal: Patient/Family Education  Outcome: Progressing Towards Goal     Problem: Patient Education: Go to Patient Education Activity  Goal: Patient/Family Education  Outcome: Progressing Towards Goal     Problem: Pneumonia: Day 3  Goal: Off Pathway (Use only if patient is Off Pathway)  Outcome: Progressing Towards Goal     Problem: Pneumonia: Day 3  Goal: Activity/Safety  Outcome: Progressing Towards Goal     Problem: Pneumonia: Day 3  Goal: Diagnostic Test/Procedures  Outcome: Progressing Towards Goal     Problem: Pneumonia: Day 3  Goal: Nutrition/Diet  Outcome: Progressing Towards Goal     Problem: Pneumonia: Day 3  Goal: Discharge Planning  Outcome: Progressing Towards Goal     Problem: Pneumonia: Day 3  Goal: Medications  Outcome: Progressing Towards Goal     Problem: Pneumonia: Day 3  Goal: Respiratory  Outcome: Progressing Towards Goal     Problem: Pneumonia: Day 3  Goal: Treatments/Interventions/Procedures  Outcome: Progressing Towards Goal     Problem: Pneumonia: Day 3  Goal: Psychosocial  Outcome: Progressing Towards Goal     Problem: Pneumonia: Day 3  Goal: *Oxygen saturation within defined limits  Outcome: Progressing Towards Goal     Problem: Pneumonia: Day 3  Goal: *Hemodynamically stable  Outcome: Progressing Towards Goal     Problem: Pneumonia: Day 3  Goal: *Demonstrates progressive activity  Outcome: Progressing Towards Goal     Problem: Pneumonia: Day 3  Goal: *Tolerating diet  Outcome: Progressing Towards Goal     Problem: Pneumonia: Day 3  Goal: *Describes available resources and support systems  Outcome: Progressing Towards Goal     Problem: Pneumonia: Day 3  Goal: *Optimal pain control at patient's stated goal  Outcome: Progressing Towards Goal

## 2020-01-24 NOTE — PROGRESS NOTES
1915 Bedside and Verbal shift change report given to Dolores RN (oncoming nurse) by Ilya Harris RN (offgoing nurse). Report included the following information SBAR, Kardex, Intake/Output, MAR, Recent Results and Cardiac Rhythm Sinus Jenne Nyhan. Introduced self as primary RN. Initial assessment completed. VSS. Pt denies additional complaints at this time. Plan for the evening discussed with pt and he verbalized understanding. Bed locked and in low position with call bell within reach. Instructed pt to press call green when needed. White board updated with this RN's name. 1935 Pt requested cough medicine. PRN medication was administered. 2020 IV to the left hand reported to be painful with some swelling. No redness or heat. IV was discontinued at this time. Call bell within reach. 2100 Pt requested a meal. Pt meal provided. Denies any nausea. Call bell within reach. 2330 Pt resting quietly in bed with eyes closed, respirations even and unlabored. No acute distress noted. Call bell within reach. 0100 Purposeful hourly rounding completed. Patient has no complaints at this time. Rates pain a 0/10. Does not have to use the restroom at this time. Patient in a position of comfort. Belongings secure in their room, side rails up x 2, bedside table within reach, and call bell within reach. Will continue to monitor and re-assess as appropriate. 0230 No acute distress. Pt resting with eyes closed. Call bell within reach. 0430 Purposeful hourly rounding completed. Patient has no complaints at this time. Patient in a position of comfort. Bedside table within reach, and call bell within reach. Will continue to monitor and re-assess as appropriate. 0500 Pt requested PRN robitussin AC for cough. 0700 Pt attempting to return to the bed and stated he felt dizzy. O2 via nasal cannula was applied at 2l/m for some SOB. 0730 Pt states he feels better once he is back in bed.    Bedside and Verbal shift change report given to Mable Singh RN (oncoming nurse) by Ron Gonzalez (offgoing nurse). Report included the following information SBAR, Kardex, Intake/Output, MAR, Recent Results and Cardiac Rhythm sinus leonidas.

## 2020-01-30 NOTE — CDMP QUERY
Admitted with syncope/possible seizure activity. He was noted to have influenza and pneumonia. Patient was treated with antibiotics and Tamiflu. After study, can the suspect the etiology of the patient's presenting symptoms be further specified as: 
 
·Syncope/possible seizure due to Pneumonia ·Syncope/possible seizure due to________ ·Other________ ·Unable to determine The medical record reflects the following: 
  Risk Factors: 67 yr. old male admitted after a syncopal episode. Found to have CAP/influenza B. Clinical Indicators: Neurology note mentioned, \"Episode of syncope a/w foaming at mouth, eyes rolled back in head and body stiffening. Had second episode of syncope while here. HR 
running low over last 24 hours. EEG doesn't show any seizure discharges and MRI of the  
brain doesn't show any evidence of recent stroke. Discussed with patient that the 
description of the first event is consistent with seizure (possibly symptomatic from the Pneumonia), while the second event doesn't (possibly due to bradycardia). No abnormality on MRI Brain and EEG, and no prior lifetime 
seizure, therefor not starting patient on anti-seizure medication at this time. MD discussed with him that if he were to have another seizure-like event, he should be 
started on anti-seizure medication at that time. \" Cardiology noted, \"Syncope -unclear etiology. \" Treatment: Lab work, chest x-ray, CT chest,  Tamiflu, IV Zithromax, Rocephin, and Flagyl, MRI brain, EEG Thank You, Victor M Viveros RN, Clinical  
[phone]

## 2020-02-10 ENCOUNTER — OFFICE VISIT (OUTPATIENT)
Dept: NEUROLOGY | Age: 80
End: 2020-02-10

## 2020-02-10 VITALS
WEIGHT: 145 LBS | SYSTOLIC BLOOD PRESSURE: 110 MMHG | HEART RATE: 63 BPM | OXYGEN SATURATION: 98 % | DIASTOLIC BLOOD PRESSURE: 70 MMHG | BODY MASS INDEX: 22.76 KG/M2 | HEIGHT: 67 IN

## 2020-02-10 DIAGNOSIS — R55 SYNCOPE, UNSPECIFIED SYNCOPE TYPE: Primary | ICD-10-CM

## 2020-02-10 NOTE — PROGRESS NOTES
Pt is here todafor a f/u from the hospital. Pt was admitted on 1/21/20 and d/c on 1/24/20 for Syncope.

## 2020-02-10 NOTE — PROGRESS NOTES
Hussain Pride is a 67 y.o. male who presents with the following  Chief Complaint   Patient presents with   [facility] Follow Up       HPI       Patient comes in with wife for a follow up for syncope episode. He has been doing really well since last visit in the hospital.   No syncope since. No dizziness, lightheadedness. He was diagnosed with flu b and right lower lobe pneumonia in the hospital.   He was evaluated by us to rule out neurological cause. MRI brain and EEG and doppler all normal. No neurological starting point. He is following with PCP and cardiac also. He is back to full time exercising, drinking more water. No headaches, dizziness, memory loss. He is eating and sleeping well. He has no concerns today. Wife agrees. He was said to be found on his bedroom floor. He remembers getting up and feeling very dizzy and passing out. Had a significant fever, felt like crap. Has not been eating or drinking well due to this sickness. Allergies   Allergen Reactions    Iodinated Contrast Media Shortness of Breath     Lung aspiration  Short of breath       Current Outpatient Medications   Medication Sig    testosterone cypionate (DEPOTESTOTERONE CYPIONATE) 200 mg/mL injection 200 mg by IntraMUSCular route See Admin Instructions. Every 10 days    amLODIPine (NORVASC) 5 mg tablet Take 5 mg by mouth daily (after breakfast).  tamsulosin (FLOMAX) 0.4 mg capsule Take 0.4 mg by mouth nightly.  traZODone (DESYREL) 100 mg tablet Take 50 mg by mouth nightly.  naproxen sodium (ALEVE PO) Take 1 Tab by mouth two (2) times a day. Indications: back pain, recent rotator cuff surgery    omeprazole (PRILOSEC) 40 mg capsule Take 40 mg by mouth daily (after breakfast).  ALPRAZolam (XANAX) 0.25 mg tablet Take 0.25 mg by mouth three (3) times daily.  aspirin 81 mg chewable tablet Take 81 mg by mouth daily.  atorvastatin (LIPITOR) 40 mg tablet Take 40 mg by mouth nightly.      No current facility-administered medications for this visit. Social History     Tobacco Use   Smoking Status Former Smoker    Packs/day: 1.00    Years: 20.00    Pack years: 20.00    Last attempt to quit:     Years since quittin.1   Smokeless Tobacco Never Used       Past Medical History:   Diagnosis Date    Anxiety     Arthritis     BPH (benign prostatic hyperplasia)     Chronic pain     GERD (gastroesophageal reflux disease)     HTN (hypertension), benign     Rotator cuff tear     RIGHT     TIA (transient ischemic attack)        Past Surgical History:   Procedure Laterality Date    CARDIAC SURG PROCEDURE UNLIST      HX ADENOIDECTOMY      HX APPENDECTOMY      HX CORONARY ARTERY BYPASS GRAFT      HX GI      COLONOSCOPY    HX POLYPECTOMY      HX TONSILLECTOMY      HX VASECTOMY         Family History   Problem Relation Age of Onset    Cancer Mother         COLON    Cancer Father         BRAIN    Heart Disease Brother     Stroke Neg Hx     Anesth Problems Neg Hx        Social History     Socioeconomic History    Marital status:      Spouse name: Not on file    Number of children: Not on file    Years of education: Not on file    Highest education level: Not on file   Tobacco Use    Smoking status: Former Smoker     Packs/day: 1.00     Years: 20.00     Pack years: 20.00     Last attempt to quit:      Years since quittin.1    Smokeless tobacco: Never Used   Substance and Sexual Activity    Alcohol use: Yes     Alcohol/week: 7.0 standard drinks     Types: 7 Glasses of wine per week    Drug use: No       Review of Systems   Eyes: Negative for blurred vision, double vision and photophobia. Cardiovascular: Negative for chest pain, palpitations and orthopnea. Gastrointestinal: Negative for nausea and vomiting. Neurological: Negative for dizziness, tingling, tremors, seizures, loss of consciousness and headaches.        Remainder of comprehensive review is negative. Physical Exam :    Visit Vitals  /70   Pulse 63   Ht 5' 7\" (1.702 m)   Wt 65.8 kg (145 lb)   SpO2 98%   BMI 22.71 kg/m²       General: Well defined, nourished, and groomed individual in no acute distress.    Neck: Supple, nontender, no bruits, no pain with resistance to active range of motion.    Heart: Regular rate and rhythm, no murmurs, rub, or gallop. Normal S1S2. Lungs: Clear to auscultation bilaterally with equal chest expansion, no cough, no wheeze  Musculoskeletal: Extremities revealed no edema and had full range of motion of joints.    Psych: Good mood and bright affect    NEUROLOGICAL EXAMINATION:    Mental Status: Alert and oriented to person, place, and time    Cranial Nerves:    II, III, IV, VI: Visual acuity grossly intact. Visual fields are normal.    Pupils are equal, round, and reactive to light and accommodation.    Extra-ocular movements are full and fluid. Fundoscopic exam was benign, no ptosis or nystagmus.    V-XII: Hearing is grossly intact. Facial features are symmetric, with normal sensation and strength. The palate rises symmetrically and the tongue protrudes midline. Sternocleidomastoids 5/5. Motor Examination: Normal tone, bulk, and strength, 5/5 muscle strength throughout. Coordination: Finger to nose was normal. No resting or intention tremor    Gait and Station: Steady while walking. Normal arm swing. No pronator drift. No muscle wasting or fasiculations noted. Reflexes: DTRs 2+ throughout. Artur Weiss         Results for orders placed or performed during the hospital encounter of 01/21/20   RESPIRATORY PANEL,PCR,NASOPHARYNGEAL   Result Value Ref Range    Adenovirus NOT DETECTED NOTD      Coronavirus 229E NOT DETECTED NOTD      Coronavirus HKU1 NOT DETECTED NOTD      Coronavirus CVNL63 NOT DETECTED NOTD      Coronavirus OC43 NOT DETECTED NOTD      Metapneumovirus NOT DETECTED NOTD      Rhinovirus and Enterovirus NOT DETECTED NOTD Influenza A NOT DETECTED NOTD      Influenza A, subtype H1 NOT DETECTED NOTD      Influenza A, subtype H3 NOT DETECTED NOTD      INFLUENZA A H1N1 PCR NOT DETECTED NOTD      Influenza B DETECTED (A) NOTD      Parainfluenza 1 NOT DETECTED NOTD      Parainfluenza 2 NOT DETECTED NOTD      Parainfluenza 3 NOT DETECTED NOTD      Parainfluenza virus 4 NOT DETECTED NOTD      RSV by PCR NOT DETECTED NOTD      B. parapertussis, PCR NOT DETECTED NOTD      Bordetella pertussis - PCR NOT DETECTED NOTD      Chlamydophila pneumoniae DNA, QL, PCR NOT DETECTED NOTD      Mycoplasma pneumoniae DNA, QL, PCR NOT DETECTED NOTD     LEGIONELLA PNEUMOPHILA AG, URINE   Result Value Ref Range    Source URINE      L pneumophila S1 Ag, urine NEGATIVE  NEGATIVE     S. PNEUMO AG, UR/CSF   Result Value Ref Range    Source URINE      Specimen Urine     Streptococcus pneumoniae Ag NEGATIVE  NEGATIVE      Fluid culture Not indicated. Organism ID Not indicated. Please note Comment     CBC WITH AUTOMATED DIFF   Result Value Ref Range    WBC 7.4 4.1 - 11.1 K/uL    RBC 4.54 4.10 - 5.70 M/uL    HGB 14.0 12.1 - 17.0 g/dL    HCT 41.1 36.6 - 50.3 %    MCV 90.5 80.0 - 99.0 FL    MCH 30.8 26.0 - 34.0 PG    MCHC 34.1 30.0 - 36.5 g/dL    RDW 13.6 11.5 - 14.5 %    PLATELET 193 120 - 290 K/uL    MPV 9.2 8.9 - 12.9 FL    NRBC 0.0 0  WBC    ABSOLUTE NRBC 0.00 0.00 - 0.01 K/uL    NEUTROPHILS 83 (H) 32 - 75 %    LYMPHOCYTES 8 (L) 12 - 49 %    MONOCYTES 8 5 - 13 %    EOSINOPHILS 1 0 - 7 %    BASOPHILS 0 0 - 1 %    IMMATURE GRANULOCYTES 0 0.0 - 0.5 %    ABS. NEUTROPHILS 6.1 1.8 - 8.0 K/UL    ABS. LYMPHOCYTES 0.6 (L) 0.8 - 3.5 K/UL    ABS. MONOCYTES 0.6 0.0 - 1.0 K/UL    ABS. EOSINOPHILS 0.1 0.0 - 0.4 K/UL    ABS. BASOPHILS 0.0 0.0 - 0.1 K/UL    ABS. IMM.  GRANS. 0.0 0.00 - 0.04 K/UL    DF SMEAR SCANNED      RBC COMMENTS NORMOCYTIC, NORMOCHROMIC     METABOLIC PANEL, COMPREHENSIVE   Result Value Ref Range    Sodium 140 136 - 145 mmol/L    Potassium 3.6 3.5 - 5.1 mmol/L    Chloride 108 97 - 108 mmol/L    CO2 26 21 - 32 mmol/L    Anion gap 6 5 - 15 mmol/L    Glucose 129 (H) 65 - 100 mg/dL    BUN 18 6 - 20 MG/DL    Creatinine 0.99 0.70 - 1.30 MG/DL    BUN/Creatinine ratio 18 12 - 20      GFR est AA >60 >60 ml/min/1.73m2    GFR est non-AA >60 >60 ml/min/1.73m2    Calcium 8.4 (L) 8.5 - 10.1 MG/DL    Bilirubin, total 0.4 0.2 - 1.0 MG/DL    ALT (SGPT) 23 12 - 78 U/L    AST (SGOT) 15 15 - 37 U/L    Alk. phosphatase 73 45 - 117 U/L    Protein, total 6.2 (L) 6.4 - 8.2 g/dL    Albumin 3.7 3.5 - 5.0 g/dL    Globulin 2.5 2.0 - 4.0 g/dL    A-G Ratio 1.5 1.1 - 2.2     MAGNESIUM   Result Value Ref Range    Magnesium 1.7 1.6 - 2.4 mg/dL   PHOSPHORUS   Result Value Ref Range    Phosphorus 1.9 (L) 2.6 - 4.7 MG/DL   NT-PRO BNP   Result Value Ref Range    NT pro-BNP 70 <450 PG/ML   TROPONIN I   Result Value Ref Range    Troponin-I, Qt. <0.05 <0.05 ng/mL   PTT   Result Value Ref Range    aPTT 27.3 22.1 - 32.0 sec    aPTT, therapeutic range     58.0 - 77.0 SECS   PROTHROMBIN TIME + INR   Result Value Ref Range    INR 1.1 0.9 - 1.1      Prothrombin time 10.8 9.0 - 11.1 sec   SAMPLES BEING HELD   Result Value Ref Range    SAMPLES BEING HELD 1SST,1RED,1DRKGRN     COMMENT        Add-on orders for these samples will be processed based on acceptable specimen integrity and analyte stability, which may vary by analyte.    D DIMER   Result Value Ref Range    D-dimer 2.34 (H) 0.00 - 0.65 mg/L FEU   MYCOPLASMA AB, IGG/IGM   Result Value Ref Range    M. pneumoniae Ab, IgG 109 (H) 0 - 99 U/mL    M. pneumoniae Ab, IgM <770 0 - 966 U/mL   METABOLIC PANEL, BASIC   Result Value Ref Range    Sodium 139 136 - 145 mmol/L    Potassium 3.9 3.5 - 5.1 mmol/L    Chloride 108 97 - 108 mmol/L    CO2 26 21 - 32 mmol/L    Anion gap 5 5 - 15 mmol/L    Glucose 126 (H) 65 - 100 mg/dL    BUN 18 6 - 20 MG/DL    Creatinine 0.86 0.70 - 1.30 MG/DL    BUN/Creatinine ratio 21 (H) 12 - 20      GFR est AA >60 >60 ml/min/1.73m2    GFR est non-AA >60 >60 ml/min/1.73m2    Calcium 8.5 8.5 - 10.1 MG/DL   CBC W/O DIFF   Result Value Ref Range    WBC 7.1 4.1 - 11.1 K/uL    RBC 4.44 4.10 - 5.70 M/uL    HGB 13.5 12.1 - 17.0 g/dL    HCT 39.8 36.6 - 50.3 %    MCV 89.6 80.0 - 99.0 FL    MCH 30.4 26.0 - 34.0 PG    MCHC 33.9 30.0 - 36.5 g/dL    RDW 13.5 11.5 - 14.5 %    PLATELET 849 186 - 720 K/uL    MPV 9.6 8.9 - 12.9 FL    NRBC 0.0 0  WBC    ABSOLUTE NRBC 0.00 0.00 - 0.01 K/uL   PHOSPHORUS   Result Value Ref Range    Phosphorus 3.4 2.6 - 4.7 MG/DL   HEMOGLOBIN A1C WITH EAG   Result Value Ref Range    Hemoglobin A1c 5.6 4.0 - 5.6 %    Est. average glucose 114 mg/dL   LIPID PANEL   Result Value Ref Range    LIPID PROFILE          Cholesterol, total 112 <200 MG/DL    Triglyceride 51 <150 MG/DL    HDL Cholesterol 64 MG/DL    LDL, calculated 37.8 0 - 100 MG/DL    VLDL, calculated 10.2 MG/DL    CHOL/HDL Ratio 1.8 0.0 - 5.0     EKG, 12 LEAD, INITIAL   Result Value Ref Range    Ventricular Rate 80 BPM    Atrial Rate 80 BPM    P-R Interval 170 ms    QRS Duration 90 ms    Q-T Interval 368 ms    QTC Calculation (Bezet) 424 ms    Calculated P Axis 37 degrees    Calculated R Axis 27 degrees    Calculated T Axis 31 degrees    Diagnosis       Normal sinus rhythm  Normal ECG  When compared with ECG of 08-OCT-2019 14:06,  Vent.  rate has increased BY  29 BPM  Confirmed by Debora James M.D., Heriberto Luna ([id number]) on 1/21/2020 10:01:16 AM     DUPLEX CAROTID BILATERAL   Result Value Ref Range    Right subclavian sys 186.4 cm/s    RIGHT SUBCLAVIAN ARTERY D 10.86 cm/s    Right cca dist sys 104.3 cm/s    Right CCA dist ulloa 10.7 cm/s    Right CCA prox sys 194.3 cm/s    Right CCA prox ulloa 6.0 cm/s    Right eca sys 80.1 cm/s    RIGHT EXTERNAL CAROTID ARTERY D 5.81 cm/s    Right ICA dist sys 47.6 cm/s    Right ICA dist ulloa 10.7 cm/s    Right ICA mid sys 78.2 cm/s    Right ICA mid ulloa 18.0 cm/s    Right ICA prox sys 47.5 cm/s    Right ICA prox ulloa 12.4 cm/s    Right vertebral sys 36.5 cm/s    RIGHT VERTEBRAL ARTERY D 5.26 cm/s    Right ICA/CCA sys 0.8     Left subclavian sys 146.3 cm/s    LEFT SUBCLAVIAN ARTERY D 0.00 cm/s    Left CCA dist sys 59.8 cm/s    Left CCA dist ulloa 11.0 cm/s    Left CCA prox sys 80.8 cm/s    Left CCA prox ulloa 11.9 cm/s    Left ECA sys 86.4 cm/s    LEFT EXTERNAL CAROTID ARTERY D 7.25 cm/s    Left ICA dist sys 41.9 cm/s    Left ICA dist ulloa 12.1 cm/s    Left ICA mid sys 54.3 cm/s    Left ICA mid ulloa 15.0 cm/s    Left ICA prox sys 52.4 cm/s    Left ICA prox ulloa 10.4 cm/s    Left vertebral sys 33.1 cm/s    LEFT VERTEBRAL ARTERY D 6.39 cm/s    Left ICA/CCA sys 0.91    ECHO ADULT COMPLETE   Result Value Ref Range    LA Volume 56.51 18 - 58 mL    Ao Root D 3.64 cm    Aortic Valve Systolic Peak Velocity 446.32 cm/s    Aortic Valve Area by Continuity of Peak Velocity 2.3 cm2    AoV PG 16.0 mmHg    LVIDd 5.35 4.2 - 5.9 cm    LVPWd 0.76 0.6 - 1.0 cm    LVIDs 3.49 cm    IVSd 0.84 0.6 - 1.0 cm    LVOT d 2.03 cm    LVOT Peak Velocity 140.28 cm/s    LVOT Peak Gradient 7.9 mmHg    MV A Hair 115.42 cm/s    MV E Hair 90.50 cm/s    MV E/A 0.78     Left Atrium to Aortic Root Ratio 1.01     RVIDd 3.99 cm    LA Vol 4C 41.20 18 - 58 mL    LA Vol 2C 46.43 18 - 58 mL    LV Mass .1 88 - 224 g    LV Mass AL Index 100.4 49 - 115 g/m2    Mitral Regurgitant Peak Velocity 267.65 cm/s    Mitral Valve E Wave Deceleration Time 209.0 ms    Left Atrium Major Axis 3.67 cm    Triscuspid Valve Regurgitation Peak Gradient 25.8 mmHg    Pulmonic Valve Max Velocity 100.53 cm/s    TR Max Velocity 254.06 cm/s    LA Vol Index 32.04 16 - 28 ml/m2    LA Vol Index 26.32 16 - 28 ml/m2    LA Vol Index 23.36 16 - 28 ml/m2    MR Peak Gradient 28.7 mmHg    Left Ventricular Fractional Shortening by 2D [phone] %    PV End Diastolic Velocity 0.8 mmHg    Mitral Valve Deceleration [place] [phone]     AV Velocity Ratio 0.70     PV peak gradient 4.0 mmHg       No orders of the defined types were placed in this encounter. No diagnosis found. Discussed all his normal neurological testing. Discussed moving forward. Neurologically he is stable and can resume driving. No neurological deficits noted on extensive physical exam.  Has no further reason to follow up with us unless questions. Doing well. Stay active. Will fill out DMV forms.            This note will not be viewable in [employer]

## 2020-02-10 NOTE — LETTER
2/10/2020 9:18 AM 
 
Mr. Dover [address] [facility] [address] To Whom It May Concern,  
 
Mr. William Zarco. Is currently under the care of [address] [place] [address] Neurology. He was seen and examined in office today and is considered neurologically stable to operate a motor vehicle. If you have any further questions please contact our office. Sincerely, Jessica Howard NP

## 2020-04-23 ENCOUNTER — OFFICE VISIT (OUTPATIENT)
Dept: NEUROLOGY | Age: 80
End: 2020-04-23

## 2020-04-23 VITALS
TEMPERATURE: 97.8 F | HEIGHT: 67 IN | SYSTOLIC BLOOD PRESSURE: 112 MMHG | OXYGEN SATURATION: 97 % | HEART RATE: 60 BPM | WEIGHT: 142 LBS | DIASTOLIC BLOOD PRESSURE: 60 MMHG | BODY MASS INDEX: 22.29 KG/M2

## 2020-04-23 DIAGNOSIS — R55 SYNCOPE, UNSPECIFIED SYNCOPE TYPE: Primary | ICD-10-CM

## 2020-04-23 RX ORDER — ALPRAZOLAM 0.5 MG/1
0.25 TABLET ORAL
COMMUNITY
Start: 2020-04-07

## 2020-04-23 NOTE — PROGRESS NOTES
Elin Hyman is a 67 y.o. male who presents with the following  Chief Complaint   Patient presents with    Follow-up       HPI      Patient comes in for a follow up for syncope episode and DMV paperwork. He has been doing well still since last visit. DMV did not accept previous paperwork. He has not had any syncope, dizziness, headaches, memory trouble. Nothing since the hospital stay. Family agrees. He was diagnosed with flu b and right lower lobe pneumonia in the hospital. Had a 103 fever and fainted in home. He was evaluated by us to rule out neurological cause. MRI brain and EEG and doppler all normal. No neurological starting point. He is following with PCP and cardiac also. He is back to full time exercising, drinking more water.        He was said to be found on his bedroom floor. He remembers getting up and feeling very dizzy and passing out. Had a significant fever, felt like crap. Has not been eating or drinking well due to this sickness.                Allergies   Allergen Reactions    Iodinated Contrast Media Shortness of Breath     Lung aspiration  Short of breath       Current Outpatient Medications   Medication Sig    ALPRAZolam (XANAX) 0.5 mg tablet TAKE 1 TABLET BY MOUTH TWICE A DAY (DOSE INCREASE)    testosterone cypionate (DEPOTESTOTERONE CYPIONATE) 200 mg/mL injection 200 mg by IntraMUSCular route See Admin Instructions. Every 10 days    amLODIPine (NORVASC) 5 mg tablet Take 5 mg by mouth daily (after breakfast).  tamsulosin (FLOMAX) 0.4 mg capsule Take 0.4 mg by mouth nightly.  traZODone (DESYREL) 100 mg tablet Take 50 mg by mouth nightly.  naproxen sodium (ALEVE PO) Take 1 Tab by mouth two (2) times a day. Indications: back pain, recent rotator cuff surgery    omeprazole (PRILOSEC) 40 mg capsule Take 40 mg by mouth daily (after breakfast).  aspirin 81 mg chewable tablet Take 81 mg by mouth daily.     atorvastatin (LIPITOR) 40 mg tablet Take 40 mg by mouth nightly. No current facility-administered medications for this visit. Social History     Tobacco Use   Smoking Status Former Smoker    Packs/day: 1.00    Years: 20.00    Pack years: 20.00    Last attempt to quit:     Years since quittin.3   Smokeless Tobacco Never Used       Past Medical History:   Diagnosis Date    Anxiety     Arthritis     BPH (benign prostatic hyperplasia)     Chronic pain     GERD (gastroesophageal reflux disease)     HTN (hypertension), benign     Rotator cuff tear     RIGHT     TIA (transient ischemic attack)        Past Surgical History:   Procedure Laterality Date    CARDIAC SURG PROCEDURE UNLIST      HX ADENOIDECTOMY      HX APPENDECTOMY      HX CORONARY ARTERY BYPASS GRAFT      HX GI      COLONOSCOPY    HX POLYPECTOMY      HX TONSILLECTOMY      HX VASECTOMY         Family History   Problem Relation Age of Onset    Cancer Mother         COLON    Cancer Father         BRAIN    Heart Disease Brother     Stroke Neg Hx     Anesth Problems Neg Hx        Social History     Socioeconomic History    Marital status:      Spouse name: Not on file    Number of children: Not on file    Years of education: Not on file    Highest education level: Not on file   Tobacco Use    Smoking status: Former Smoker     Packs/day: 1.00     Years: 20.00     Pack years: 20.00     Last attempt to quit:      Years since quittin.3    Smokeless tobacco: Never Used   Substance and Sexual Activity    Alcohol use: Yes     Alcohol/week: 7.0 standard drinks     Types: 7 Glasses of wine per week    Drug use: No       Review of Systems   Eyes: Negative for blurred vision, double vision, photophobia and pain. Respiratory: Negative for shortness of breath and wheezing. Cardiovascular: Negative for chest pain and palpitations. Gastrointestinal: Negative for nausea and vomiting.    Neurological: Negative for dizziness, tingling, tremors, seizures, loss of consciousness and headaches. Remainder of comprehensive review is negative. Physical Exam :    Visit Vitals  /60   Pulse 60   Temp 97.8 °F (36.6 °C)   Ht 5' 7\" (1.702 m)   Wt 64.4 kg (142 lb)   SpO2 97%   BMI 22.24 kg/m²       General: Well defined, nourished, and groomed individual in no acute distress.    Neck: Supple, nontender, no bruits, no pain with resistance to active range of motion.    Heart: Regular rate and rhythm, no murmurs, rub, or gallop. Normal S1S2. Lungs: Clear to auscultation bilaterally with equal chest expansion, no cough, no wheeze  Musculoskeletal: Extremities revealed no edema and had full range of motion of joints.    Psych: Good mood and bright affect    NEUROLOGICAL EXAMINATION:    Mental Status: Alert and oriented to person, place, and time    Cranial Nerves:    II, III, IV, VI: Visual acuity grossly intact. Visual fields are normal.    Pupils are equal, round, and reactive to light and accommodation.    Extra-ocular movements are full and fluid. Fundoscopic exam was benign, no ptosis or nystagmus.    V-XII: Hearing is grossly intact. Facial features are symmetric, with normal sensation and strength. The palate rises symmetrically and the tongue protrudes midline. Sternocleidomastoids 5/5. Motor Examination: Normal tone, bulk, and strength, 5/5 muscle strength throughout. Coordination: Finger to nose was normal. No resting or intention tremor    Gait and Station: Steady while walking. Normal arm swing. No pronator drift. No muscle wasting or fasiculations noted. Reflexes: DTRs 2+ throughout.             Results for orders placed or performed during the hospital encounter of 01/21/20   RESPIRATORY PANEL,PCR,NASOPHARYNGEAL   Result Value Ref Range    Adenovirus NOT DETECTED NOTD      Coronavirus 229E NOT DETECTED NOTD      Coronavirus HKU1 NOT DETECTED NOTD      Coronavirus CVNL63 NOT DETECTED NOTD      Coronavirus OC43 NOT DETECTED NOTD      Metapneumovirus NOT DETECTED NOTD      Rhinovirus and Enterovirus NOT DETECTED NOTD      Influenza A NOT DETECTED NOTD      Influenza A, subtype H1 NOT DETECTED NOTD      Influenza A, subtype H3 NOT DETECTED NOTD      INFLUENZA A H1N1 PCR NOT DETECTED NOTD      Influenza B DETECTED (A) NOTD      Parainfluenza 1 NOT DETECTED NOTD      Parainfluenza 2 NOT DETECTED NOTD      Parainfluenza 3 NOT DETECTED NOTD      Parainfluenza virus 4 NOT DETECTED NOTD      RSV by PCR NOT DETECTED NOTD      B. parapertussis, PCR NOT DETECTED NOTD      Bordetella pertussis - PCR NOT DETECTED NOTD      Chlamydophila pneumoniae DNA, QL, PCR NOT DETECTED NOTD      Mycoplasma pneumoniae DNA, QL, PCR NOT DETECTED NOTD     LEGIONELLA PNEUMOPHILA AG, URINE   Result Value Ref Range    Source URINE      L pneumophila S1 Ag, urine NEGATIVE  NEGATIVE     S. PNEUMO AG, UR/CSF   Result Value Ref Range    Source URINE      Specimen Urine     Streptococcus pneumoniae Ag NEGATIVE  NEGATIVE      Fluid culture Not indicated. Organism ID Not indicated. Please note Comment     CBC WITH AUTOMATED DIFF   Result Value Ref Range    WBC 7.4 4.1 - 11.1 K/uL    RBC 4.54 4.10 - 5.70 M/uL    HGB 14.0 12.1 - 17.0 g/dL    HCT 41.1 36.6 - 50.3 %    MCV 90.5 80.0 - 99.0 FL    MCH 30.8 26.0 - 34.0 PG    MCHC 34.1 30.0 - 36.5 g/dL    RDW 13.6 11.5 - 14.5 %    PLATELET 013 254 - 321 K/uL    MPV 9.2 8.9 - 12.9 FL    NRBC 0.0 0  WBC    ABSOLUTE NRBC 0.00 0.00 - 0.01 K/uL    NEUTROPHILS 83 (H) 32 - 75 %    LYMPHOCYTES 8 (L) 12 - 49 %    MONOCYTES 8 5 - 13 %    EOSINOPHILS 1 0 - 7 %    BASOPHILS 0 0 - 1 %    IMMATURE GRANULOCYTES 0 0.0 - 0.5 %    ABS. NEUTROPHILS 6.1 1.8 - 8.0 K/UL    ABS. LYMPHOCYTES 0.6 (L) 0.8 - 3.5 K/UL    ABS. MONOCYTES 0.6 0.0 - 1.0 K/UL    ABS. EOSINOPHILS 0.1 0.0 - 0.4 K/UL    ABS. BASOPHILS 0.0 0.0 - 0.1 K/UL    ABS. IMM.  GRANS. 0.0 0.00 - 0.04 K/UL    DF SMEAR SCANNED      RBC COMMENTS NORMOCYTIC, NORMOCHROMIC METABOLIC PANEL, COMPREHENSIVE   Result Value Ref Range    Sodium 140 136 - 145 mmol/L    Potassium 3.6 3.5 - 5.1 mmol/L    Chloride 108 97 - 108 mmol/L    CO2 26 21 - 32 mmol/L    Anion gap 6 5 - 15 mmol/L    Glucose 129 (H) 65 - 100 mg/dL    BUN 18 6 - 20 MG/DL    Creatinine 0.99 0.70 - 1.30 MG/DL    BUN/Creatinine ratio 18 12 - 20      GFR est AA >60 >60 ml/min/1.73m2    GFR est non-AA >60 >60 ml/min/1.73m2    Calcium 8.4 (L) 8.5 - 10.1 MG/DL    Bilirubin, total 0.4 0.2 - 1.0 MG/DL    ALT (SGPT) 23 12 - 78 U/L    AST (SGOT) 15 15 - 37 U/L    Alk. phosphatase 73 45 - 117 U/L    Protein, total 6.2 (L) 6.4 - 8.2 g/dL    Albumin 3.7 3.5 - 5.0 g/dL    Globulin 2.5 2.0 - 4.0 g/dL    A-G Ratio 1.5 1.1 - 2.2     MAGNESIUM   Result Value Ref Range    Magnesium 1.7 1.6 - 2.4 mg/dL   PHOSPHORUS   Result Value Ref Range    Phosphorus 1.9 (L) 2.6 - 4.7 MG/DL   NT-PRO BNP   Result Value Ref Range    NT pro-BNP 70 <450 PG/ML   TROPONIN I   Result Value Ref Range    Troponin-I, Qt. <0.05 <0.05 ng/mL   PTT   Result Value Ref Range    aPTT 27.3 22.1 - 32.0 sec    aPTT, therapeutic range     58.0 - 77.0 SECS   PROTHROMBIN TIME + INR   Result Value Ref Range    INR 1.1 0.9 - 1.1      Prothrombin time 10.8 9.0 - 11.1 sec   SAMPLES BEING HELD   Result Value Ref Range    SAMPLES BEING HELD 1SST,1RED,1DRKGRN     COMMENT        Add-on orders for these samples will be processed based on acceptable specimen integrity and analyte stability, which may vary by analyte.    D DIMER   Result Value Ref Range    D-dimer 2.34 (H) 0.00 - 0.65 mg/L FEU   MYCOPLASMA AB, IGG/IGM   Result Value Ref Range    M. pneumoniae Ab, IgG 109 (H) 0 - 99 U/mL    M. pneumoniae Ab, IgM <770 0 - 753 U/mL   METABOLIC PANEL, BASIC   Result Value Ref Range    Sodium 139 136 - 145 mmol/L    Potassium 3.9 3.5 - 5.1 mmol/L    Chloride 108 97 - 108 mmol/L    CO2 26 21 - 32 mmol/L    Anion gap 5 5 - 15 mmol/L    Glucose 126 (H) 65 - 100 mg/dL    BUN 18 6 - 20 MG/DL Creatinine 0.86 0.70 - 1.30 MG/DL    BUN/Creatinine ratio 21 (H) 12 - 20      GFR est AA >60 >60 ml/min/1.73m2    GFR est non-AA >60 >60 ml/min/1.73m2    Calcium 8.5 8.5 - 10.1 MG/DL   CBC W/O DIFF   Result Value Ref Range    WBC 7.1 4.1 - 11.1 K/uL    RBC 4.44 4.10 - 5.70 M/uL    HGB 13.5 12.1 - 17.0 g/dL    HCT 39.8 36.6 - 50.3 %    MCV 89.6 80.0 - 99.0 FL    MCH 30.4 26.0 - 34.0 PG    MCHC 33.9 30.0 - 36.5 g/dL    RDW 13.5 11.5 - 14.5 %    PLATELET 514 436 - 671 K/uL    MPV 9.6 8.9 - 12.9 FL    NRBC 0.0 0  WBC    ABSOLUTE NRBC 0.00 0.00 - 0.01 K/uL   PHOSPHORUS   Result Value Ref Range    Phosphorus 3.4 2.6 - 4.7 MG/DL   HEMOGLOBIN A1C WITH EAG   Result Value Ref Range    Hemoglobin A1c 5.6 4.0 - 5.6 %    Est. average glucose 114 mg/dL   LIPID PANEL   Result Value Ref Range    LIPID PROFILE          Cholesterol, total 112 <200 MG/DL    Triglyceride 51 <150 MG/DL    HDL Cholesterol 64 MG/DL    LDL, calculated 37.8 0 - 100 MG/DL    VLDL, calculated 10.2 MG/DL    CHOL/HDL Ratio 1.8 0.0 - 5.0     EKG, 12 LEAD, INITIAL   Result Value Ref Range    Ventricular Rate 80 BPM    Atrial Rate 80 BPM    P-R Interval 170 ms    QRS Duration 90 ms    Q-T Interval 368 ms    QTC Calculation (Bezet) 424 ms    Calculated P Axis 37 degrees    Calculated R Axis 27 degrees    Calculated T Axis 31 degrees    Diagnosis       Normal sinus rhythm  Normal ECG  When compared with ECG of 08-OCT-2019 14:06,  Vent.  rate has increased BY  29 BPM  Confirmed by Juan Sales M.D., [facility] [id number]) on 1/21/2020 10:01:16 AM     DUPLEX CAROTID BILATERAL   Result Value Ref Range    Right subclavian sys 186.4 cm/s    RIGHT SUBCLAVIAN ARTERY D 10.86 cm/s    Right cca dist sys 104.3 cm/s    Right CCA dist ulloa 10.7 cm/s    Right CCA prox sys 194.3 cm/s    Right CCA prox ulloa 6.0 cm/s    Right eca sys 80.1 cm/s    RIGHT EXTERNAL CAROTID ARTERY D 5.81 cm/s    Right ICA dist sys 47.6 cm/s    Right ICA dist ulloa 10.7 cm/s    Right ICA mid sys 78.2 cm/s    Right ICA mid ulloa 18.0 cm/s    Right ICA prox sys 47.5 cm/s    Right ICA prox ulloa 12.4 cm/s    Right vertebral sys 36.5 cm/s    RIGHT VERTEBRAL ARTERY D 5.26 cm/s    Right ICA/CCA sys 0.8     Left subclavian sys 146.3 cm/s    LEFT SUBCLAVIAN ARTERY D 0.00 cm/s    Left CCA dist sys 59.8 cm/s    Left CCA dist ulloa 11.0 cm/s    Left CCA prox sys 80.8 cm/s    Left CCA prox ulloa 11.9 cm/s    Left ECA sys 86.4 cm/s    LEFT EXTERNAL CAROTID ARTERY D 7.25 cm/s    Left ICA dist sys 41.9 cm/s    Left ICA dist ulloa 12.1 cm/s    Left ICA mid sys 54.3 cm/s    Left ICA mid ulloa 15.0 cm/s    Left ICA prox sys 52.4 cm/s    Left ICA prox ulloa 10.4 cm/s    Left vertebral sys 33.1 cm/s    LEFT VERTEBRAL ARTERY D 6.39 cm/s    Left ICA/CCA sys 0.91    ECHO ADULT COMPLETE   Result Value Ref Range    LA Volume 56.51 18 - 58 mL    Ao Root D 3.64 cm    Aortic Valve Systolic Peak Velocity 974.73 cm/s    Aortic Valve Area by Continuity of Peak Velocity 2.3 cm2    AoV PG 16.0 mmHg    LVIDd 5.35 4.2 - 5.9 cm    LVPWd 0.76 0.6 - 1.0 cm    LVIDs 3.49 cm    IVSd 0.84 0.6 - 1.0 cm    LVOT d 2.03 cm    LVOT Peak Velocity 140.28 cm/s    LVOT Peak Gradient 7.9 mmHg    MV A Hair 115.42 cm/s    MV E Hair 90.50 cm/s    MV E/A 0.78     Left Atrium to Aortic Root Ratio 1.01     RVIDd 3.99 cm    LA Vol 4C 41.20 18 - 58 mL    LA Vol 2C 46.43 18 - 58 mL    LV Mass .1 88 - 224 g    LV Mass AL Index 100.4 49 - 115 g/m2    Mitral Regurgitant Peak Velocity 267.65 cm/s    Mitral Valve E Wave Deceleration Time 209.0 ms    Left Atrium Major Axis 3.67 cm    Triscuspid Valve Regurgitation Peak Gradient 25.8 mmHg    Pulmonic Valve Max Velocity 100.53 cm/s    TR Max Velocity 254.06 cm/s    LA Vol Index 32.04 16 - 28 ml/m2    LA Vol Index 26.32 16 - 28 ml/m2    LA Vol Index 23.36 16 - 28 ml/m2    MR Peak Gradient 28.7 mmHg    Left Ventricular Fractional Shortening by 2D [id number] %    PV End Diastolic Velocity 0.8 mmHg    Mitral Valve Deceleration Barnes [id number] AV Velocity Ratio 0.70     PV peak gradient 4.0 mmHg       Orders Placed This Encounter    ALPRAZolam (XANAX) 0.5 mg tablet     Sig: TAKE 1 TABLET BY MOUTH TWICE A DAY (DOSE INCREASE)       No diagnosis found. Post syncope. Still doing well. OK to resume driving. No neurological deficits or concerns on exam in office today. Will fill out paperwork again and send in to [facility]. He looks and feels great. stay active.    No FU necessary             This note will not be viewable in [place]

## 2020-04-23 NOTE — LETTER
4/23/2020 9:22 AM 
 
Mr. Dover [address] [facility] [address] To Whom It May Concern,  
 
Romana Davenportmanda Aida. Is currently under the care of [facility]. He was seen and examined in office today and is considered neurologically stable to operate a motor vehicle. If you have any further questions please contact our office. Sincerely, Janina Rios NP

## 2020-08-05 ENCOUNTER — OFFICE VISIT (OUTPATIENT)
Dept: NEUROLOGY | Age: 80
End: 2020-08-05
Payer: MEDICARE

## 2020-08-05 VITALS
BODY MASS INDEX: 22.87 KG/M2 | WEIGHT: 146 LBS | DIASTOLIC BLOOD PRESSURE: 60 MMHG | SYSTOLIC BLOOD PRESSURE: 112 MMHG | TEMPERATURE: 98.3 F | OXYGEN SATURATION: 98 % | HEART RATE: 59 BPM

## 2020-08-05 DIAGNOSIS — R55 NEAR SYNCOPE: Primary | ICD-10-CM

## 2020-08-05 PROCEDURE — G8752 SYS BP LESS 140: HCPCS | Performed by: NURSE PRACTITIONER

## 2020-08-05 PROCEDURE — 1101F PT FALLS ASSESS-DOCD LE1/YR: CPT | Performed by: NURSE PRACTITIONER

## 2020-08-05 PROCEDURE — 99213 OFFICE O/P EST LOW 20 MIN: CPT | Performed by: NURSE PRACTITIONER

## 2020-08-05 PROCEDURE — G8420 CALC BMI NORM PARAMETERS: HCPCS | Performed by: NURSE PRACTITIONER

## 2020-08-05 PROCEDURE — G8432 DEP SCR NOT DOC, RNG: HCPCS | Performed by: NURSE PRACTITIONER

## 2020-08-05 PROCEDURE — G8427 DOCREV CUR MEDS BY ELIG CLIN: HCPCS | Performed by: NURSE PRACTITIONER

## 2020-08-05 PROCEDURE — G8754 DIAS BP LESS 90: HCPCS | Performed by: NURSE PRACTITIONER

## 2020-08-05 PROCEDURE — G8536 NO DOC ELDER MAL SCRN: HCPCS | Performed by: NURSE PRACTITIONER

## 2020-08-05 NOTE — PROGRESS NOTES
Tawanna Page is a 79 y.o. male who presents with the following  Chief Complaint   Patient presents with    Follow-up    Dizziness       HPI       Patient comes in for a follow up for syncope episode and DMV paperwork. He is back to get this filled out again. He has been doing well still since last visit. He is walking miles a day. Recap:  He has not had any syncope, dizziness, headaches, memory trouble. Nothing since the hospital stay. Family agrees.  [facility]  He was diagnosed with flu b and right lower lobe pneumonia in the hospital. Had a 103 fever and fainted in home.      He was evaluated by us to rule out neurological cause. MRI brain and EEG and doppler all normal. No neurological starting point. He is following with PCP and cardiac also. He is back to full time exercising, drinking more water.         He was said to be found on his bedroom floor. He remembers getting up and feeling very dizzy and passing out. Had a significant fever, felt like crap. Has not been eating or drinking well due to this sickness.              Allergies   Allergen Reactions    Iodinated Contrast Media Shortness of Breath     Lung aspiration  Short of breath       Current Outpatient Medications   Medication Sig    ALPRAZolam (XANAX) 0.5 mg tablet TAKE 1 TABLET BY MOUTH TWICE A DAY (DOSE INCREASE)    testosterone cypionate (DEPOTESTOTERONE CYPIONATE) 200 mg/mL injection 200 mg by IntraMUSCular route See Admin Instructions. Every 10 days    amLODIPine (NORVASC) 5 mg tablet Take 5 mg by mouth daily (after breakfast).  tamsulosin (FLOMAX) 0.4 mg capsule Take 0.4 mg by mouth nightly.  naproxen sodium (ALEVE PO) Take 1 Tab by mouth two (2) times a day. Indications: back pain, recent rotator cuff surgery    omeprazole (PRILOSEC) 40 mg capsule Take 40 mg by mouth daily (after breakfast).  aspirin 81 mg chewable tablet Take 81 mg by mouth daily.     atorvastatin (LIPITOR) 40 mg tablet Take 40 mg by mouth nightly. No current facility-administered medications for this visit. Social History     Tobacco Use   Smoking Status Former Smoker    Packs/day: 1.00    Years: 20.00    Pack years: 20.00    Last attempt to quit:     Years since quittin.6   Smokeless Tobacco Never Used       Past Medical History:   Diagnosis Date    Anxiety     Arthritis     BPH (benign prostatic hyperplasia)     Chronic pain     GERD (gastroesophageal reflux disease)     HTN (hypertension), benign     Rotator cuff tear     RIGHT     TIA (transient ischemic attack)        Past Surgical History:   Procedure Laterality Date    CARDIAC SURG PROCEDURE UNLIST      HX ADENOIDECTOMY      HX APPENDECTOMY      HX CORONARY ARTERY BYPASS GRAFT      HX GI      COLONOSCOPY    HX POLYPECTOMY      HX TONSILLECTOMY      HX VASECTOMY         Family History   Problem Relation Age of Onset    Cancer Mother         COLON    Cancer Father         BRAIN    Heart Disease Brother     Stroke Neg Hx     Anesth Problems Neg Hx        Social History     Socioeconomic History    Marital status:      Spouse name: Not on file    Number of children: Not on file    Years of education: Not on file    Highest education level: Not on file   Tobacco Use    Smoking status: Former Smoker     Packs/day: 1.00     Years: 20.00     Pack years: 20.00     Last attempt to quit:      Years since quittin.6    Smokeless tobacco: Never Used   Substance and Sexual Activity    Alcohol use: Yes     Alcohol/week: 7.0 standard drinks     Types: 7 Glasses of wine per week    Drug use: No       Review of Systems   Eyes: Negative for blurred vision, double vision and photophobia. Respiratory: Negative for shortness of breath and wheezing. Cardiovascular: Negative for chest pain. Gastrointestinal: Negative for nausea and vomiting.    Neurological: Negative for dizziness, tingling, seizures, loss of consciousness and headaches. Remainder of comprehensive review is negative. Physical Exam :    Visit Vitals  /60   Pulse (!) 59   Temp 98.3 °F (36.8 °C)   Wt 66.2 kg (146 lb)   SpO2 98%   BMI 22.87 kg/m²       General: Well defined, nourished, and groomed individual in no acute distress.    Neck: Supple, nontender, no bruits, no pain with resistance to active range of motion.    Heart: Regular rate and rhythm, no murmurs, rub, or gallop. Normal S1S2. Lungs: Clear to auscultation bilaterally with equal chest expansion, no cough, no wheeze  Musculoskeletal: Extremities revealed no edema and had full range of motion of joints.    Psych: Good mood and bright affect    NEUROLOGICAL EXAMINATION:    Mental Status: Alert and oriented to person, place, and time    Cranial Nerves:    II, III, IV, VI: Visual acuity grossly intact. Visual fields are normal.    Pupils are equal, round, and reactive to light and accommodation.    Extra-ocular movements are full and fluid. Fundoscopic exam was benign, no ptosis or nystagmus.    V-XII: Hearing is grossly intact. Facial features are symmetric, with normal sensation and strength. The palate rises symmetrically and the tongue protrudes midline. Sternocleidomastoids 5/5. Motor Examination: Normal tone, bulk, and strength, 5/5 muscle strength throughout. Coordination: Finger to nose was normal. No resting or intention tremor    Gait and Station: Steady while walking. Normal arm swing. No pronator drift. No muscle wasting or fasiculations noted. Reflexes: DTRs 2+ throughout.           Results for orders placed or performed during the hospital encounter of 01/21/20   RESPIRATORY PANEL,PCR,NASOPHARYNGEAL    Specimen: Nasopharyngeal   Result Value Ref Range    Adenovirus NOT DETECTED NOTD      Coronavirus 229E NOT DETECTED NOTD      Coronavirus HKU1 NOT DETECTED NOTD      Coronavirus CVNL63 NOT DETECTED NOTD      Coronavirus OC43 NOT DETECTED NOTD Metapneumovirus NOT DETECTED NOTD      Rhinovirus and Enterovirus NOT DETECTED NOTD      Influenza A NOT DETECTED NOTD      Influenza A, subtype H1 NOT DETECTED NOTD      Influenza A, subtype H3 NOT DETECTED NOTD      INFLUENZA A H1N1 PCR NOT DETECTED NOTD      Influenza B DETECTED (A) NOTD      Parainfluenza 1 NOT DETECTED NOTD      Parainfluenza 2 NOT DETECTED NOTD      Parainfluenza 3 NOT DETECTED NOTD      Parainfluenza virus 4 NOT DETECTED NOTD      RSV by PCR NOT DETECTED NOTD      B. parapertussis, PCR NOT DETECTED NOTD      Bordetella pertussis - PCR NOT DETECTED NOTD      Chlamydophila pneumoniae DNA, QL, PCR NOT DETECTED NOTD      Mycoplasma pneumoniae DNA, QL, PCR NOT DETECTED NOTD     LEGIONELLA PNEUMOPHILA AG, URINE    Specimen: Urine   Result Value Ref Range    Source URINE      L pneumophila S1 Ag, urine NEGATIVE  NEGATIVE     S. PNEUMO AG, UR/CSF   Result Value Ref Range    Source URINE      Specimen Urine     Streptococcus pneumoniae Ag NEGATIVE  NEGATIVE      Fluid culture Not indicated. Organism ID Not indicated. Please note Comment     CBC WITH AUTOMATED DIFF   Result Value Ref Range    WBC 7.4 4.1 - 11.1 K/uL    RBC 4.54 4.10 - 5.70 M/uL    HGB 14.0 12.1 - 17.0 g/dL    HCT 41.1 36.6 - 50.3 %    MCV 90.5 80.0 - 99.0 FL    MCH 30.8 26.0 - 34.0 PG    MCHC 34.1 30.0 - 36.5 g/dL    RDW 13.6 11.5 - 14.5 %    PLATELET 328 225 - 243 K/uL    MPV 9.2 8.9 - 12.9 FL    NRBC 0.0 0  WBC    ABSOLUTE NRBC 0.00 0.00 - 0.01 K/uL    NEUTROPHILS 83 (H) 32 - 75 %    LYMPHOCYTES 8 (L) 12 - 49 %    MONOCYTES 8 5 - 13 %    EOSINOPHILS 1 0 - 7 %    BASOPHILS 0 0 - 1 %    IMMATURE GRANULOCYTES 0 0.0 - 0.5 %    ABS. NEUTROPHILS 6.1 1.8 - 8.0 K/UL    ABS. LYMPHOCYTES 0.6 (L) 0.8 - 3.5 K/UL    ABS. MONOCYTES 0.6 0.0 - 1.0 K/UL    ABS. EOSINOPHILS 0.1 0.0 - 0.4 K/UL    ABS. BASOPHILS 0.0 0.0 - 0.1 K/UL    ABS. IMM.  GRANS. 0.0 0.00 - 0.04 K/UL    DF SMEAR SCANNED      RBC COMMENTS NORMOCYTIC, NORMOCHROMIC METABOLIC PANEL, COMPREHENSIVE   Result Value Ref Range    Sodium 140 136 - 145 mmol/L    Potassium 3.6 3.5 - 5.1 mmol/L    Chloride 108 97 - 108 mmol/L    CO2 26 21 - 32 mmol/L    Anion gap 6 5 - 15 mmol/L    Glucose 129 (H) 65 - 100 mg/dL    BUN 18 6 - 20 MG/DL    Creatinine 0.99 0.70 - 1.30 MG/DL    BUN/Creatinine ratio 18 12 - 20      GFR est AA >60 >60 ml/min/1.73m2    GFR est non-AA >60 >60 ml/min/1.73m2    Calcium 8.4 (L) 8.5 - 10.1 MG/DL    Bilirubin, total 0.4 0.2 - 1.0 MG/DL    ALT (SGPT) 23 12 - 78 U/L    AST (SGOT) 15 15 - 37 U/L    Alk. phosphatase 73 45 - 117 U/L    Protein, total 6.2 (L) 6.4 - 8.2 g/dL    Albumin 3.7 3.5 - 5.0 g/dL    Globulin 2.5 2.0 - 4.0 g/dL    A-G Ratio 1.5 1.1 - 2.2     MAGNESIUM   Result Value Ref Range    Magnesium 1.7 1.6 - 2.4 mg/dL   PHOSPHORUS   Result Value Ref Range    Phosphorus 1.9 (L) 2.6 - 4.7 MG/DL   NT-PRO BNP   Result Value Ref Range    NT pro-BNP 70 <450 PG/ML   TROPONIN I   Result Value Ref Range    Troponin-I, Qt. <0.05 <0.05 ng/mL   PTT   Result Value Ref Range    aPTT 27.3 22.1 - 32.0 sec    aPTT, therapeutic range     58.0 - 77.0 SECS   PROTHROMBIN TIME + INR   Result Value Ref Range    INR 1.1 0.9 - 1.1      Prothrombin time 10.8 9.0 - 11.1 sec   SAMPLES BEING HELD   Result Value Ref Range    SAMPLES BEING HELD 1SST,1RED,1DRKGRN     COMMENT        Add-on orders for these samples will be processed based on acceptable specimen integrity and analyte stability, which may vary by analyte.    D DIMER   Result Value Ref Range    D-dimer 2.34 (H) 0.00 - 0.65 mg/L FEU   MYCOPLASMA AB, IGG/IGM   Result Value Ref Range    M. pneumoniae Ab, IgG 109 (H) 0 - 99 U/mL    M. pneumoniae Ab, IgM <770 0 - 246 U/mL   METABOLIC PANEL, BASIC   Result Value Ref Range    Sodium 139 136 - 145 mmol/L    Potassium 3.9 3.5 - 5.1 mmol/L    Chloride 108 97 - 108 mmol/L    CO2 26 21 - 32 mmol/L    Anion gap 5 5 - 15 mmol/L    Glucose 126 (H) 65 - 100 mg/dL    BUN 18 6 - 20 MG/DL Creatinine 0.86 0.70 - 1.30 MG/DL    BUN/Creatinine ratio 21 (H) 12 - 20      GFR est AA >60 >60 ml/min/1.73m2    GFR est non-AA >60 >60 ml/min/1.73m2    Calcium 8.5 8.5 - 10.1 MG/DL   CBC W/O DIFF   Result Value Ref Range    WBC 7.1 4.1 - 11.1 K/uL    RBC 4.44 4.10 - 5.70 M/uL    HGB 13.5 12.1 - 17.0 g/dL    HCT 39.8 36.6 - 50.3 %    MCV 89.6 80.0 - 99.0 FL    MCH 30.4 26.0 - 34.0 PG    MCHC 33.9 30.0 - 36.5 g/dL    RDW 13.5 11.5 - 14.5 %    PLATELET 825 281 - 993 K/uL    MPV 9.6 8.9 - 12.9 FL    NRBC 0.0 0  WBC    ABSOLUTE NRBC 0.00 0.00 - 0.01 K/uL   PHOSPHORUS   Result Value Ref Range    Phosphorus 3.4 2.6 - 4.7 MG/DL   HEMOGLOBIN A1C WITH EAG   Result Value Ref Range    Hemoglobin A1c 5.6 4.0 - 5.6 %    Est. average glucose 114 mg/dL   LIPID PANEL   Result Value Ref Range    LIPID PROFILE          Cholesterol, total 112 <200 MG/DL    Triglyceride 51 <150 MG/DL    HDL Cholesterol 64 MG/DL    LDL, calculated 37.8 0 - 100 MG/DL    VLDL, calculated 10.2 MG/DL    CHOL/HDL Ratio 1.8 0.0 - 5.0     EKG, 12 LEAD, INITIAL   Result Value Ref Range    Ventricular Rate 80 BPM    Atrial Rate 80 BPM    P-R Interval 170 ms    QRS Duration 90 ms    Q-T Interval 368 ms    QTC Calculation (Bezet) 424 ms    Calculated P Axis 37 degrees    Calculated R Axis 27 degrees    Calculated T Axis 31 degrees    Diagnosis       Normal sinus rhythm  Normal ECG  When compared with ECG of 08-OCT-2019 14:06,  Vent.  rate has increased BY  29 BPM  Confirmed by Bridget Dewitt M.D., [facility] ([id number]) on 1/21/2020 10:01:16 AM     DUPLEX CAROTID BILATERAL   Result Value Ref Range    Right subclavian sys 186.4 cm/s    RIGHT SUBCLAVIAN ARTERY D 10.86 cm/s    Right cca dist sys 104.3 cm/s    Right CCA dist ulloa 10.7 cm/s    Right CCA prox sys 194.3 cm/s    Right CCA prox ulloa 6.0 cm/s    Right eca sys 80.1 cm/s    RIGHT EXTERNAL CAROTID ARTERY D 5.81 cm/s    Right ICA dist sys 47.6 cm/s    Right ICA dist ulloa 10.7 cm/s    Right ICA mid sys 78.2 cm/s    Right ICA mid ulloa 18.0 cm/s    Right ICA prox sys 47.5 cm/s    Right ICA prox ulloa 12.4 cm/s    Right vertebral sys 36.5 cm/s    RIGHT VERTEBRAL ARTERY D 5.26 cm/s    Right ICA/CCA sys 0.8     Left subclavian sys 146.3 cm/s    LEFT SUBCLAVIAN ARTERY D 0.00 cm/s    Left CCA dist sys 59.8 cm/s    Left CCA dist ulloa 11.0 cm/s    Left CCA prox sys 80.8 cm/s    Left CCA prox ulloa 11.9 cm/s    Left ECA sys 86.4 cm/s    LEFT EXTERNAL CAROTID ARTERY D 7.25 cm/s    Left ICA dist sys 41.9 cm/s    Left ICA dist ulloa 12.1 cm/s    Left ICA mid sys 54.3 cm/s    Left ICA mid ulloa 15.0 cm/s    Left ICA prox sys 52.4 cm/s    Left ICA prox ulloa 10.4 cm/s    Left vertebral sys 33.1 cm/s    LEFT VERTEBRAL ARTERY D 6.39 cm/s    Left ICA/CCA sys 0.91    ECHO ADULT COMPLETE   Result Value Ref Range    LA Volume 56.51 18 - 58 mL    Ao Root D 3.64 cm    Aortic Valve Systolic Peak Velocity 137.79 cm/s    Aortic Valve Area by Continuity of Peak Velocity 2.3 cm2    AoV PG 16.0 mmHg    LVIDd 5.35 4.2 - 5.9 cm    LVPWd 0.76 0.6 - 1.0 cm    LVIDs 3.49 cm    IVSd 0.84 0.6 - 1.0 cm    LVOT d 2.03 cm    LVOT Peak Velocity 140.28 cm/s    LVOT Peak Gradient 7.9 mmHg    MV A Hair 115.42 cm/s    MV E Hair 90.50 cm/s    MV E/A 0.78     Left Atrium to Aortic Root Ratio 1.01     RVIDd 3.99 cm    LA Vol 4C 41.20 18 - 58 mL    LA Vol 2C 46.43 18 - 58 mL    LV Mass .1 88 - 224 g    LV Mass AL Index 100.4 49 - 115 g/m2    Mitral Regurgitant Peak Velocity 267.65 cm/s    Mitral Valve E Wave Deceleration Time 209.0 ms    Left Atrium Major Axis 3.67 cm    Triscuspid Valve Regurgitation Peak Gradient 25.8 mmHg    Pulmonic Valve Max Velocity 100.53 cm/s    TR Max Velocity 254.06 cm/s    LA Vol Index 32.04 16 - 28 ml/m2    LA Vol Index 26.32 16 - 28 ml/m2    LA Vol Index 23.36 16 - 28 ml/m2    MR Peak Gradient 28.7 mmHg    Left Ventricular Fractional Shortening by 2D [phone] %    PV End Diastolic Velocity 0.8 mmHg    Mitral Valve Deceleration [place] [phone] AV Velocity Ratio 0.70     PV peak gradient 4.0 mmHg       No orders of the defined types were placed in this encounter. No diagnosis found. Here for a follow up for syncope. Nothing since previous episode. DMV paperwork filled out today   No issues with neurological or driving. FU if needed.              This note will not be viewable in "[employer]hart

## 2020-12-10 ENCOUNTER — HOSPITAL ENCOUNTER (OUTPATIENT)
Dept: GENERAL RADIOLOGY | Age: 80
Discharge: HOME | End: 2020-12-10
Attending: INTERNAL MEDICINE
Payer: MEDICARE

## 2020-12-10 ENCOUNTER — TRANSCRIBE ORDER (OUTPATIENT)
Dept: GENERAL RADIOLOGY | Age: 80
End: 2020-12-10

## 2020-12-10 DIAGNOSIS — R63.4 WEIGHT LOSS: Primary | ICD-10-CM

## 2020-12-10 DIAGNOSIS — R63.4 WEIGHT LOSS: ICD-10-CM

## 2020-12-10 PROCEDURE — 71046 X-RAY EXAM CHEST 2 VIEWS: CPT

## 2021-02-08 ENCOUNTER — OFFICE VISIT (OUTPATIENT)
Dept: NEUROLOGY | Age: 81
End: 2021-02-08
Payer: MEDICARE

## 2021-02-08 VITALS
WEIGHT: 147 LBS | HEART RATE: 60 BPM | SYSTOLIC BLOOD PRESSURE: 122 MMHG | OXYGEN SATURATION: 98 % | DIASTOLIC BLOOD PRESSURE: 68 MMHG | BODY MASS INDEX: 23.02 KG/M2

## 2021-02-08 DIAGNOSIS — R55 NEAR SYNCOPE: Primary | ICD-10-CM

## 2021-02-08 PROCEDURE — G8536 NO DOC ELDER MAL SCRN: HCPCS | Performed by: NURSE PRACTITIONER

## 2021-02-08 PROCEDURE — G8432 DEP SCR NOT DOC, RNG: HCPCS | Performed by: NURSE PRACTITIONER

## 2021-02-08 PROCEDURE — G8754 DIAS BP LESS 90: HCPCS | Performed by: NURSE PRACTITIONER

## 2021-02-08 PROCEDURE — 1101F PT FALLS ASSESS-DOCD LE1/YR: CPT | Performed by: NURSE PRACTITIONER

## 2021-02-08 PROCEDURE — G8420 CALC BMI NORM PARAMETERS: HCPCS | Performed by: NURSE PRACTITIONER

## 2021-02-08 PROCEDURE — G8428 CUR MEDS NOT DOCUMENT: HCPCS | Performed by: NURSE PRACTITIONER

## 2021-02-08 PROCEDURE — G8752 SYS BP LESS 140: HCPCS | Performed by: NURSE PRACTITIONER

## 2021-02-08 PROCEDURE — 99213 OFFICE O/P EST LOW 20 MIN: CPT | Performed by: NURSE PRACTITIONER

## 2021-02-08 RX ORDER — ALPRAZOLAM 0.25 MG/1
TABLET ORAL
COMMUNITY
Start: 2021-01-11 | End: 2021-09-15

## 2021-02-09 NOTE — PROGRESS NOTES
Ronnie Espana is a 79 y.o. male who presents with the following  Chief Complaint   Patient presents with    Follow-up    Seizure       HPI       Patient comes in for a follow up for syncope episode and DMV paperwork again. He is back to get this filled out again and has been having NO issues. He has been doing well still since last visit.   [facility] is walking miles a day. Traveling, staying active, engaged. No issues with memory, weakness. No syncope, dizziness, LOC. Doing well  No HA. Eating well. Family feels like he is doing great.        Recap:  He has not had any syncope, dizziness, headaches, memory trouble. Nothing since the hospital stay. Family agrees.  Jose Carlos Nguyen  He was diagnosed with flu b and right lower lobe pneumonia in the hospital. Had a 103 fever and fainted in home.      He was evaluated by us to rule out neurological cause. MRI brain and EEG and doppler all normal. No neurological starting point. He is following with PCP and cardiac also. He is back to full time exercising, drinking more water.         He was said to be found on his bedroom floor. He remembers getting up and feeling very dizzy and passing out. Had a significant fever, felt like crap. Has not been eating or drinking well due to this sickness.                Allergies   Allergen Reactions    Iodinated Contrast Media Shortness of Breath     Lung aspiration  Short of breath       Current Outpatient Medications   Medication Sig    ALPRAZolam (XANAX) 0.25 mg tablet     ALPRAZolam (XANAX) 0.5 mg tablet TAKE 1 TABLET BY MOUTH TWICE A DAY (DOSE INCREASE)    testosterone cypionate (DEPOTESTOTERONE CYPIONATE) 200 mg/mL injection 200 mg by IntraMUSCular route See Admin Instructions. Every 10 days    amLODIPine (NORVASC) 5 mg tablet Take 5 mg by mouth daily (after breakfast).  tamsulosin (FLOMAX) 0.4 mg capsule Take 0.4 mg by mouth nightly.  naproxen sodium (ALEVE PO) Take 1 Tab by mouth two (2) times a day. Indications: back pain, recent rotator cuff surgery    omeprazole (PRILOSEC) 40 mg capsule Take 40 mg by mouth daily (after breakfast).  aspirin 81 mg chewable tablet Take 81 mg by mouth daily.  atorvastatin (LIPITOR) 40 mg tablet Take 40 mg by mouth nightly. No current facility-administered medications for this visit. Social History     Tobacco Use   Smoking Status Former Smoker    Packs/day: 1.00    Years: 20.00    Pack years: 20.00    Quit date:     Years since quittin.1   Smokeless Tobacco Never Used       Past Medical History:   Diagnosis Date    Anxiety     Arthritis     BPH (benign prostatic hyperplasia)     Chronic pain     GERD (gastroesophageal reflux disease)     HTN (hypertension), benign     Rotator cuff tear     RIGHT     TIA (transient ischemic attack)        Past Surgical History:   Procedure Laterality Date    HX ADENOIDECTOMY      HX APPENDECTOMY      HX CORONARY ARTERY BYPASS GRAFT      HX GI      COLONOSCOPY    HX POLYPECTOMY      HX TONSILLECTOMY      HX VASECTOMY      NM CARDIAC SURG PROCEDURE UNLIST  1988       Family History   Problem Relation Age of Onset    Cancer Mother         COLON    Cancer Father         BRAIN    Heart Disease Brother     Stroke Neg Hx     Anesth Problems Neg Hx        Social History     Socioeconomic History    Marital status:      Spouse name: Not on file    Number of children: Not on file    Years of education: Not on file    Highest education level: Not on file   Tobacco Use    Smoking status: Former Smoker     Packs/day: 1.00     Years: 20.00     Pack years: 20.00     Quit date:      Years since quittin.1    Smokeless tobacco: Never Used   Substance and Sexual Activity    Alcohol use:  Yes     Alcohol/week: 7.0 standard drinks     Types: 7 Glasses of wine per week    Drug use: No       Review of Systems   Eyes: Negative for blurred vision, double vision and photophobia. Gastrointestinal: Negative for nausea and vomiting. Neurological: Negative for dizziness, tingling, seizures, loss of consciousness and headaches. Remainder of comprehensive review is negative. Physical Exam :    Visit Vitals  /68   Pulse 60   Wt 66.7 kg (147 lb)   SpO2 98%   BMI 23.02 kg/m²       General: Well defined, nourished, and groomed individual in no acute distress.    Neck: Supple, nontender, no bruits, no pain with resistance to active range of motion.    Heart: Regular rate and rhythm, no murmurs, rub, or gallop. Normal S1S2. Lungs: Clear to auscultation bilaterally with equal chest expansion, no cough, no wheeze  Musculoskeletal: Extremities revealed no edema and had full range of motion of joints.    Psych: Good mood and bright affect    NEUROLOGICAL EXAMINATION:    Mental Status: Alert and oriented to person, place, and time    Cranial Nerves:    II, III, IV, VI: Visual acuity grossly intact. Visual fields are normal.    Pupils are equal, round, and reactive to light and accommodation.    Extra-ocular movements are full and fluid. Fundoscopic exam was benign, no ptosis or nystagmus.    V-XII: Hearing is grossly intact. Facial features are symmetric, with normal sensation and strength. The palate rises symmetrically and the tongue protrudes midline. Sternocleidomastoids 5/5. Motor Examination: Normal tone, bulk, and strength, 5/5 muscle strength throughout. Coordination: Finger to nose was normal. No resting or intention tremor    Gait and Station: Steady while walking. Normal arm swing. No pronator drift. No muscle wasting or fasiculations noted. Reflexes: DTRs 2+ throughout.             Results for orders placed or performed during the hospital encounter of 01/21/20   RESPIRATORY PANEL,PCR,NASOPHARYNGEAL    Specimen: Nasopharyngeal   Result Value Ref Range    Adenovirus NOT DETECTED NOTD      Coronavirus 229E NOT DETECTED NOTD      Coronavirus HKU1 NOT DETECTED NOTD      Coronavirus CVNL63 NOT DETECTED NOTD      Coronavirus OC43 NOT DETECTED NOTD      Metapneumovirus NOT DETECTED NOTD      Rhinovirus and Enterovirus NOT DETECTED NOTD      Influenza A NOT DETECTED NOTD      Influenza A, subtype H1 NOT DETECTED NOTD      Influenza A, subtype H3 NOT DETECTED NOTD      INFLUENZA A H1N1 PCR NOT DETECTED NOTD      Influenza B DETECTED (A) NOTD      Parainfluenza 1 NOT DETECTED NOTD      Parainfluenza 2 NOT DETECTED NOTD      Parainfluenza 3 NOT DETECTED NOTD      Parainfluenza virus 4 NOT DETECTED NOTD      RSV by PCR NOT DETECTED NOTD      B. parapertussis, PCR NOT DETECTED NOTD      Bordetella pertussis - PCR NOT DETECTED NOTD      Chlamydophila pneumoniae DNA, QL, PCR NOT DETECTED NOTD      Mycoplasma pneumoniae DNA, QL, PCR NOT DETECTED NOTD     LEGIONELLA PNEUMOPHILA AG, URINE    Specimen: Urine   Result Value Ref Range    Source URINE      L pneumophila S1 Ag, urine NEGATIVE  NEGATIVE     S. PNEUMO AG, UR/CSF   Result Value Ref Range    Source URINE      Specimen Urine     Streptococcus pneumoniae Ag NEGATIVE  NEGATIVE      Fluid culture Not indicated. Organism ID Not indicated. Please note Comment     CBC WITH AUTOMATED DIFF   Result Value Ref Range    WBC 7.4 4.1 - 11.1 K/uL    RBC 4.54 4.10 - 5.70 M/uL    HGB 14.0 12.1 - 17.0 g/dL    HCT 41.1 36.6 - 50.3 %    MCV 90.5 80.0 - 99.0 FL    MCH 30.8 26.0 - 34.0 PG    MCHC 34.1 30.0 - 36.5 g/dL    RDW 13.6 11.5 - 14.5 %    PLATELET 789 672 - 003 K/uL    MPV 9.2 8.9 - 12.9 FL    NRBC 0.0 0  WBC    ABSOLUTE NRBC 0.00 0.00 - 0.01 K/uL    NEUTROPHILS 83 (H) 32 - 75 %    LYMPHOCYTES 8 (L) 12 - 49 %    MONOCYTES 8 5 - 13 %    EOSINOPHILS 1 0 - 7 %    BASOPHILS 0 0 - 1 %    IMMATURE GRANULOCYTES 0 0.0 - 0.5 %    ABS. NEUTROPHILS 6.1 1.8 - 8.0 K/UL    ABS. LYMPHOCYTES 0.6 (L) 0.8 - 3.5 K/UL    ABS. MONOCYTES 0.6 0.0 - 1.0 K/UL    ABS. EOSINOPHILS 0.1 0.0 - 0.4 K/UL    ABS. BASOPHILS 0.0 0.0 - 0.1 K/UL    ABS. IMM. GRANS. 0.0 0.00 - 0.04 K/UL    DF SMEAR SCANNED      RBC COMMENTS NORMOCYTIC, NORMOCHROMIC     METABOLIC PANEL, COMPREHENSIVE   Result Value Ref Range    Sodium 140 136 - 145 mmol/L    Potassium 3.6 3.5 - 5.1 mmol/L    Chloride 108 97 - 108 mmol/L    CO2 26 21 - 32 mmol/L    Anion gap 6 5 - 15 mmol/L    Glucose 129 (H) 65 - 100 mg/dL    BUN 18 6 - 20 MG/DL    Creatinine 0.99 0.70 - 1.30 MG/DL    BUN/Creatinine ratio 18 12 - 20      GFR est AA >60 >60 ml/min/1.73m2    GFR est non-AA >60 >60 ml/min/1.73m2    Calcium 8.4 (L) 8.5 - 10.1 MG/DL    Bilirubin, total 0.4 0.2 - 1.0 MG/DL    ALT (SGPT) 23 12 - 78 U/L    AST (SGOT) 15 15 - 37 U/L    Alk. phosphatase 73 45 - 117 U/L    Protein, total 6.2 (L) 6.4 - 8.2 g/dL    Albumin 3.7 3.5 - 5.0 g/dL    Globulin 2.5 2.0 - 4.0 g/dL    A-G Ratio 1.5 1.1 - 2.2     MAGNESIUM   Result Value Ref Range    Magnesium 1.7 1.6 - 2.4 mg/dL   PHOSPHORUS   Result Value Ref Range    Phosphorus 1.9 (L) 2.6 - 4.7 MG/DL   NT-PRO BNP   Result Value Ref Range    NT pro-BNP 70 <450 PG/ML   TROPONIN I   Result Value Ref Range    Troponin-I, Qt. <0.05 <0.05 ng/mL   PTT   Result Value Ref Range    aPTT 27.3 22.1 - 32.0 sec    aPTT, therapeutic range     58.0 - 77.0 SECS   PROTHROMBIN TIME + INR   Result Value Ref Range    INR 1.1 0.9 - 1.1      Prothrombin time 10.8 9.0 - 11.1 sec   SAMPLES BEING HELD   Result Value Ref Range    SAMPLES BEING HELD 1SST,1RED,1DRKGRN     COMMENT        Add-on orders for these samples will be processed based on acceptable specimen integrity and analyte stability, which may vary by analyte.    D DIMER   Result Value Ref Range    D-dimer 2.34 (H) 0.00 - 0.65 mg/L FEU   MYCOPLASMA AB, IGG/IGM   Result Value Ref Range    M. pneumoniae Ab, IgG 109 (H) 0 - 99 U/mL    M. pneumoniae Ab, IgM <770 0 - 193 U/mL   METABOLIC PANEL, BASIC   Result Value Ref Range    Sodium 139 136 - 145 mmol/L    Potassium 3.9 3.5 - 5.1 mmol/L    Chloride 108 97 - 108 mmol/L    CO2 26 21 - 32 mmol/L    Anion gap 5 5 - 15 mmol/L    Glucose 126 (H) 65 - 100 mg/dL    BUN 18 6 - 20 MG/DL    Creatinine 0.86 0.70 - 1.30 MG/DL    BUN/Creatinine ratio 21 (H) 12 - 20      GFR est AA >60 >60 ml/min/1.73m2    GFR est non-AA >60 >60 ml/min/1.73m2    Calcium 8.5 8.5 - 10.1 MG/DL   CBC W/O DIFF   Result Value Ref Range    WBC 7.1 4.1 - 11.1 K/uL    RBC 4.44 4.10 - 5.70 M/uL    HGB 13.5 12.1 - 17.0 g/dL    HCT 39.8 36.6 - 50.3 %    MCV 89.6 80.0 - 99.0 FL    MCH 30.4 26.0 - 34.0 PG    MCHC 33.9 30.0 - 36.5 g/dL    RDW 13.5 11.5 - 14.5 %    PLATELET 779 500 - 710 K/uL    MPV 9.6 8.9 - 12.9 FL    NRBC 0.0 0  WBC    ABSOLUTE NRBC 0.00 0.00 - 0.01 K/uL   PHOSPHORUS   Result Value Ref Range    Phosphorus 3.4 2.6 - 4.7 MG/DL   HEMOGLOBIN A1C WITH EAG   Result Value Ref Range    Hemoglobin A1c 5.6 4.0 - 5.6 %    Est. average glucose 114 mg/dL   LIPID PANEL   Result Value Ref Range    LIPID PROFILE          Cholesterol, total 112 <200 MG/DL    Triglyceride 51 <150 MG/DL    HDL Cholesterol 64 MG/DL    LDL, calculated 37.8 0 - 100 MG/DL    VLDL, calculated 10.2 MG/DL    CHOL/HDL Ratio 1.8 0.0 - 5.0     EKG, 12 LEAD, INITIAL   Result Value Ref Range    Ventricular Rate 80 BPM    Atrial Rate 80 BPM    P-R Interval 170 ms    QRS Duration 90 ms    Q-T Interval 368 ms    QTC Calculation (Bezet) 424 ms    Calculated P Axis 37 degrees    Calculated R Axis 27 degrees    Calculated T Axis 31 degrees    Diagnosis       Normal sinus rhythm  Normal ECG  When compared with ECG of 08-OCT-2019 14:06,  Vent.  rate has increased BY  29 BPM  Confirmed by Jeny Corrigan M.D., [facility] ([id number]) on 1/21/2020 10:01:16 AM     DUPLEX CAROTID BILATERAL   Result Value Ref Range    Right subclavian sys 186.4 cm/s    RIGHT SUBCLAVIAN ARTERY D 10.86 cm/s    Right cca dist sys 104.3 cm/s    Right CCA dist ulloa 10.7 cm/s    Right CCA prox sys 194.3 cm/s    Right CCA prox ulloa 6.0 cm/s    Right eca sys 80.1 cm/s    RIGHT EXTERNAL CAROTID ARTERY D 5.81 cm/s    Right ICA dist sys 47.6 cm/s    Right ICA dist ulloa 10.7 cm/s    Right ICA mid sys 78.2 cm/s    Right ICA mid ulloa 18.0 cm/s    Right ICA prox sys 47.5 cm/s    Right ICA prox ulloa 12.4 cm/s    Right vertebral sys 36.5 cm/s    RIGHT VERTEBRAL ARTERY D 5.26 cm/s    Right ICA/CCA sys 0.8     Left subclavian sys 146.3 cm/s    LEFT SUBCLAVIAN ARTERY D 0.00 cm/s    Left CCA dist sys 59.8 cm/s    Left CCA dist ulloa 11.0 cm/s    Left CCA prox sys 80.8 cm/s    Left CCA prox ulloa 11.9 cm/s    Left ECA sys 86.4 cm/s    LEFT EXTERNAL CAROTID ARTERY D 7.25 cm/s    Left ICA dist sys 41.9 cm/s    Left ICA dist ulloa 12.1 cm/s    Left ICA mid sys 54.3 cm/s    Left ICA mid ulloa 15.0 cm/s    Left ICA prox sys 52.4 cm/s    Left ICA prox ulloa 10.4 cm/s    Left vertebral sys 33.1 cm/s    LEFT VERTEBRAL ARTERY D 6.39 cm/s    Left ICA/CCA sys 0.91    ECHO ADULT COMPLETE   Result Value Ref Range    LA Volume 56.51 18 - 58 mL    Ao Root D 3.64 cm    Aortic Valve Systolic Peak Velocity 254.99 cm/s    Aortic Valve Area by Continuity of Peak Velocity 2.3 cm2    AoV PG 16.0 mmHg    LVIDd 5.35 4.2 - 5.9 cm    LVPWd 0.76 0.6 - 1.0 cm    LVIDs 3.49 cm    IVSd 0.84 0.6 - 1.0 cm    LVOT d 2.03 cm    LVOT Peak Velocity 140.28 cm/s    LVOT Peak Gradient 7.9 mmHg    MV A Hair 115.42 cm/s    MV E Hair 90.50 cm/s    MV E/A 0.78     Left Atrium to Aortic Root Ratio 1.01     RVIDd 3.99 cm    LA Vol 4C 41.20 18 - 58 mL    LA Vol 2C 46.43 18 - 58 mL    LV Mass .1 88 - 224 g    LV Mass AL Index 100.4 49 - 115 g/m2    Mitral Regurgitant Peak Velocity 267.65 cm/s    Mitral Valve E Wave Deceleration Time 209.0 ms    Left Atrium Major Axis 3.67 cm    Triscuspid Valve Regurgitation Peak Gradient 25.8 mmHg    Pulmonic Valve Max Velocity 100.53 cm/s    TR Max Velocity 254.06 cm/s    LA Vol Index 32.04 16 - 28 ml/m2    LA Vol Index 26.32 16 - 28 ml/m2    LA Vol Index 23.36 16 - 28 ml/m2    MR Peak Gradient 28.7 mmHg    Left Ventricular Fractional Shortening by 2D [phone] %    PV End Diastolic Velocity 0.8 mmHg    Mitral Valve Deceleration Catron [id number]     AV Velocity Ratio 0.70     PV peak gradient 4.0 mmHg       Orders Placed This Encounter    ALPRAZolam (XANAX) 0.25 mg tablet       No diagnosis found. No  Neurological issues on exam   Continue to drive and will fill out DMV forms. FU if needed.              This note will not be viewable in [email or web address]

## 2021-09-14 ENCOUNTER — HOSPITAL ENCOUNTER (OUTPATIENT)
Dept: PREADMISSION TESTING | Age: 81
Discharge: HOME | End: 2021-09-14
Payer: MEDICARE

## 2021-09-14 PROCEDURE — U0005 INFEC AGEN DETEC AMPLI PROBE: HCPCS

## 2021-09-15 LAB
SARS-COV-2, XPLCVT: NOT DETECTED
SOURCE, COVRS: NORMAL

## 2021-09-15 RX ORDER — VITAMIN E 1000 UNIT
1000 CAPSULE ORAL DAILY
COMMUNITY

## 2021-09-15 NOTE — PERIOP NOTES
[address] [address] [facility]   MAIN OR                                  [phone]   MAIN PRE OP                          [phone]                                                                                AMBULATORY PRE OP          [phone]  PRE-ADMISSION TESTING    [phone]   Surgery Date:  9/17/21       Is surgery arrival time given by surgeon? YES  NO  If NO, [facility] staff will call you between 3 and 7pm the day before your surgery with your arrival time. (If your surgery is on a Monday, we will call you the Friday before.)    Call [phone] after 7pm Monday-Friday if you did not receive this call. INSTRUCTIONS BEFORE YOUR SURGERY   When You  Arrive Arrive at the [address]nd [address] [facility] on the day of your surgery  Have your insurance card, photo ID, and any copayment (if needed)   Food   and   Drink NO food or drink after midnight the night before surgery    This means NO water, gum, mints, coffee, juice, etc.  No alcohol (beer, wine, liquor) 24 hours before and after surgery   Medications to   TAKE   Morning of Surgery MEDICATIONS TO TAKE THE MORNING OF SURGERY WITH A SIP OF WATER:    Xanax    norvasc   Medications  To  STOP      7 days before surgery  Non-Steroidal anti-inflammatory Drugs (NSAID's): for example, Ibuprofen (Advil, Motrin), Naproxen (Aleve)   Aspirin, if taking for pain    Herbal supplements, vitamins, and fish oil   Other:  (Pain medications not listed above, including Tylenol may be taken)   Blood  Thinners  If you take  Aspirin, Plavix, Coumadin, or any blood-thinning or anti-blood clot medicine, talk to the doctor who prescribed the medications for pre-operative instructions.    Bathing Clothing  Jewelry  Valuables      If you shower the morning of surgery, please do not apply anything to your skin (lotions, powders, deodorant, or makeup, especially mascara)   Follow Chlorhex[employer] body wash instructions provided to you during PAT appointment. Begin 3 days prior to surgery.  Do not shave or trim anywhere 24 hours before surgery   Wear your hair loose or down; no pony-tails, buns, or metal hair clips   Wear loose, comfortable, clean clothes   Wear glasses instead of contacts  [employer] money, valuables, and jewelry, including body piercings, at home   If you were given an [employer], bring it on day of surgery. Going Home - or Spending the Night  SAME-DAY SURGERY: You must have a responsible adult drive you home and stay with you 24 hours after surgery   ADMITS: If your doctor is keeping you in the hospital after surgery, leave personal belongings/luggage in your car until you have a hospital room number. Hospital discharge time is 12 noon  Drivers must be here before 12 noon unless you are told differently   Special Instructions It is now mandated that all surgical patients be tested for COVID-19 prior to surgery. Testing has to be exactly 4 days prior to surgery. Your COVID test date is 9/14/21 between 8:00 am and 11:00 am.       COVID testing will be performed curbside at the [facility] [address]. There will be signs leading you to the testing site. You will need to bring a photo ID with you to be swabbed. Patients are advised to self-quarantine at home after testing and prior to your surgery date. You will be notified if your results are positive.     What to watch for:   Coronavirus (COVID-19) affects different people in different ways   It also appears with a wide range of symptoms from mild to severe   Signs usually appear 2-14 days after exposure     If you develop any of the following, notify your doctor immediately:  o Fever  o Chills, with or without a shiver  o Muscle pain  o Headache  o Sore throat  o Dry cough  o New loss of taste or smell  o Tiredness      If you develop any of the following, call [phone]:  o Shortness of breath  o Difficulty breathing  o Chest pain  o New confusion  o Blueness of fingers and/or lips       Follow all instructions so your surgery wont be cancelled. Please, be on time. If a situation occurs and you are delayed the day of surgery, call [phone]. If your physical condition changes (like a fever, cold, flu, etc.) call your surgeon. Home medication(s) reviewed and verified via   PHONE      during PAT appointment. The patient was contacted by  PHONE      The patient verbalizes understanding of all instructions and      DOES NOT   need reinforcement.

## 2021-09-16 ENCOUNTER — ANESTHESIA EVENT (OUTPATIENT)
Dept: SURGERY | Age: 81
End: 2021-09-16
Payer: MEDICARE

## 2021-09-16 NOTE — PERIOP NOTES
Left another VM fatoumata Watts at Dr. Rosy Gannon office stating that the H&P had not been received but that only a document that stated \"surgical clearance scan\" with an attachment. However, no attachment came with this document. Requested that the H&P, and any labs/EKG be faxed to PAT if before 1500 or to the [facility] pre-op if after 1500 today.   DOS: 9/17/2021

## 2021-09-17 ENCOUNTER — HOSPITAL ENCOUNTER (OUTPATIENT)
Age: 81
Setting detail: OUTPATIENT SURGERY
Discharge: HOME | End: 2021-09-17
Attending: ORTHOPAEDIC SURGERY | Admitting: ORTHOPAEDIC SURGERY
Payer: MEDICARE

## 2021-09-17 ENCOUNTER — ANESTHESIA (OUTPATIENT)
Dept: SURGERY | Age: 81
End: 2021-09-17
Payer: MEDICARE

## 2021-09-17 VITALS
BODY MASS INDEX: 22.39 KG/M2 | HEIGHT: 67 IN | SYSTOLIC BLOOD PRESSURE: 112 MMHG | RESPIRATION RATE: 13 BRPM | DIASTOLIC BLOOD PRESSURE: 63 MMHG | WEIGHT: 142.64 LBS | TEMPERATURE: 97.8 F | OXYGEN SATURATION: 96 % | HEART RATE: 49 BPM

## 2021-09-17 DIAGNOSIS — G89.18 POST-OPERATIVE PAIN: Primary | ICD-10-CM

## 2021-09-17 PROCEDURE — 77030040361 HC SLV COMPR DVT MDII -B

## 2021-09-17 PROCEDURE — 76060000061 HC AMB SURG ANES 0.5 TO 1 HR: Performed by: ORTHOPAEDIC SURGERY

## 2021-09-17 PROCEDURE — 77030006689 HC BLD OPHTH BVR BD -A: Performed by: ORTHOPAEDIC SURGERY

## 2021-09-17 PROCEDURE — 76210000046 HC AMBSU PH II REC FIRST 0.5 HR: Performed by: ORTHOPAEDIC SURGERY

## 2021-09-17 PROCEDURE — 74011250636 HC RX REV CODE- 250/636: Performed by: NURSE ANESTHETIST, CERTIFIED REGISTERED

## 2021-09-17 PROCEDURE — 74011000250 HC RX REV CODE- 250: Performed by: ORTHOPAEDIC SURGERY

## 2021-09-17 PROCEDURE — 74011250636 HC RX REV CODE- 250/636: Performed by: ANESTHESIOLOGY

## 2021-09-17 PROCEDURE — 76030000000 HC AMB SURG OR TIME 0.5 TO 1: Performed by: ORTHOPAEDIC SURGERY

## 2021-09-17 PROCEDURE — 77030040922 HC BLNKT HYPOTHRM STRY -A

## 2021-09-17 PROCEDURE — 74011000250 HC RX REV CODE- 250: Performed by: NURSE ANESTHETIST, CERTIFIED REGISTERED

## 2021-09-17 PROCEDURE — 77030000032 HC CUF TRNQT ZIMM -B: Performed by: ORTHOPAEDIC SURGERY

## 2021-09-17 PROCEDURE — 76210000040 HC AMBSU PH I REC FIRST 0.5 HR: Performed by: ORTHOPAEDIC SURGERY

## 2021-09-17 PROCEDURE — 77030002986 HC SUT PROL J&J -A: Performed by: ORTHOPAEDIC SURGERY

## 2021-09-17 PROCEDURE — 74011250636 HC RX REV CODE- 250/636: Performed by: ORTHOPAEDIC SURGERY

## 2021-09-17 PROCEDURE — 2709999900 HC NON-CHARGEABLE SUPPLY: Performed by: ORTHOPAEDIC SURGERY

## 2021-09-17 RX ORDER — BUPIVACAINE HYDROCHLORIDE 5 MG/ML
INJECTION, SOLUTION EPIDURAL; INTRACAUDAL AS NEEDED
Status: DISCONTINUED | OUTPATIENT
Start: 2021-09-17 | End: 2021-09-17 | Stop reason: HOSPADM

## 2021-09-17 RX ORDER — PROPOFOL 10 MG/ML
INJECTION, EMULSION INTRAVENOUS
Status: DISCONTINUED | OUTPATIENT
Start: 2021-09-17 | End: 2021-09-17 | Stop reason: HOSPADM

## 2021-09-17 RX ORDER — SODIUM CHLORIDE 0.9 % (FLUSH) 0.9 %
5-40 SYRINGE (ML) INJECTION EVERY 8 HOURS
Status: DISCONTINUED | OUTPATIENT
Start: 2021-09-17 | End: 2021-09-17 | Stop reason: HOSPADM

## 2021-09-17 RX ORDER — SODIUM CHLORIDE, SODIUM LACTATE, POTASSIUM CHLORIDE, CALCIUM CHLORIDE 600; 310; 30; 20 MG/100ML; MG/100ML; MG/100ML; MG/100ML
125 INJECTION, SOLUTION INTRAVENOUS CONTINUOUS
Status: DISCONTINUED | OUTPATIENT
Start: 2021-09-17 | End: 2021-09-17 | Stop reason: HOSPADM

## 2021-09-17 RX ORDER — NALOXONE HYDROCHLORIDE 0.4 MG/ML
0.2 INJECTION, SOLUTION INTRAMUSCULAR; INTRAVENOUS; SUBCUTANEOUS
Status: DISCONTINUED | OUTPATIENT
Start: 2021-09-17 | End: 2021-09-17 | Stop reason: HOSPADM

## 2021-09-17 RX ORDER — LIDOCAINE HYDROCHLORIDE 10 MG/ML
INJECTION INFILTRATION; PERINEURAL AS NEEDED
Status: DISCONTINUED | OUTPATIENT
Start: 2021-09-17 | End: 2021-09-17 | Stop reason: HOSPADM

## 2021-09-17 RX ORDER — SODIUM CHLORIDE 0.9 % (FLUSH) 0.9 %
5-40 SYRINGE (ML) INJECTION AS NEEDED
Status: DISCONTINUED | OUTPATIENT
Start: 2021-09-17 | End: 2021-09-17 | Stop reason: HOSPADM

## 2021-09-17 RX ORDER — ONDANSETRON 2 MG/ML
4 INJECTION INTRAMUSCULAR; INTRAVENOUS AS NEEDED
Status: DISCONTINUED | OUTPATIENT
Start: 2021-09-17 | End: 2021-09-17 | Stop reason: HOSPADM

## 2021-09-17 RX ORDER — FLUMAZENIL 0.1 MG/ML
0.2 INJECTION INTRAVENOUS
Status: DISCONTINUED | OUTPATIENT
Start: 2021-09-17 | End: 2021-09-17 | Stop reason: HOSPADM

## 2021-09-17 RX ORDER — SODIUM CHLORIDE, SODIUM LACTATE, POTASSIUM CHLORIDE, CALCIUM CHLORIDE 600; 310; 30; 20 MG/100ML; MG/100ML; MG/100ML; MG/100ML
100 INJECTION, SOLUTION INTRAVENOUS CONTINUOUS
Status: DISCONTINUED | OUTPATIENT
Start: 2021-09-17 | End: 2021-09-17 | Stop reason: HOSPADM

## 2021-09-17 RX ORDER — HYDROCODONE BITARTRATE AND ACETAMINOPHEN 5; 325 MG/1; MG/1
1 TABLET ORAL
Qty: 5 TABLET | Refills: 0 | Status: SHIPPED | OUTPATIENT
Start: 2021-09-17 | End: 2021-09-20

## 2021-09-17 RX ORDER — LIDOCAINE HYDROCHLORIDE 10 MG/ML
0.1 INJECTION, SOLUTION EPIDURAL; INFILTRATION; INTRACAUDAL; PERINEURAL AS NEEDED
Status: DISCONTINUED | OUTPATIENT
Start: 2021-09-17 | End: 2021-09-17 | Stop reason: HOSPADM

## 2021-09-17 RX ORDER — HYDROMORPHONE HYDROCHLORIDE 1 MG/ML
.5-1 INJECTION, SOLUTION INTRAMUSCULAR; INTRAVENOUS; SUBCUTANEOUS
Status: DISCONTINUED | OUTPATIENT
Start: 2021-09-17 | End: 2021-09-17 | Stop reason: HOSPADM

## 2021-09-17 RX ORDER — LIDOCAINE HYDROCHLORIDE 20 MG/ML
INJECTION, SOLUTION INFILTRATION; PERINEURAL AS NEEDED
Status: DISCONTINUED | OUTPATIENT
Start: 2021-09-17 | End: 2021-09-17 | Stop reason: HOSPADM

## 2021-09-17 RX ORDER — PROPOFOL 10 MG/ML
INJECTION, EMULSION INTRAVENOUS AS NEEDED
Status: DISCONTINUED | OUTPATIENT
Start: 2021-09-17 | End: 2021-09-17 | Stop reason: HOSPADM

## 2021-09-17 RX ORDER — FENTANYL CITRATE 50 UG/ML
INJECTION, SOLUTION INTRAMUSCULAR; INTRAVENOUS AS NEEDED
Status: DISCONTINUED | OUTPATIENT
Start: 2021-09-17 | End: 2021-09-17 | Stop reason: HOSPADM

## 2021-09-17 RX ORDER — BETAMETHASONE SODIUM PHOSPHATE AND BETAMETHASONE ACETATE 3; 3 MG/ML; MG/ML
INJECTION, SUSPENSION INTRA-ARTICULAR; INTRALESIONAL; INTRAMUSCULAR; SOFT TISSUE AS NEEDED
Status: DISCONTINUED | OUTPATIENT
Start: 2021-09-17 | End: 2021-09-17 | Stop reason: HOSPADM

## 2021-09-17 RX ADMIN — PROPOFOL 20 MG: 10 INJECTION, EMULSION INTRAVENOUS at 09:07

## 2021-09-17 RX ADMIN — CEFAZOLIN SODIUM 2 G: 1 POWDER, FOR SOLUTION INTRAMUSCULAR; INTRAVENOUS at 08:59

## 2021-09-17 RX ADMIN — SODIUM CHLORIDE, POTASSIUM CHLORIDE, SODIUM LACTATE AND CALCIUM CHLORIDE: 600; 310; 30; 20 INJECTION, SOLUTION INTRAVENOUS at 07:30

## 2021-09-17 RX ADMIN — PROPOFOL 30 MG: 10 INJECTION, EMULSION INTRAVENOUS at 08:57

## 2021-09-17 RX ADMIN — FENTANYL CITRATE 25 MCG: 50 INJECTION, SOLUTION INTRAMUSCULAR; INTRAVENOUS at 08:56

## 2021-09-17 RX ADMIN — LIDOCAINE HYDROCHLORIDE 40 MG: 20 INJECTION, SOLUTION INFILTRATION; PERINEURAL at 08:57

## 2021-09-17 RX ADMIN — PROPOFOL 50 MCG/KG/MIN: 10 INJECTION, EMULSION INTRAVENOUS at 08:59

## 2021-09-17 NOTE — BRIEF OP NOTE
Brief Postoperative Note    Patient: Nakia Sage  YOB: 1940  MRN: [id number]    Date of Procedure: 9/17/2021     Pre-Op Diagnosis: Trigger middle finger of right hand [M65.331] & RIGHT elbow lateral epicondylitis    Post-Op Diagnosis: Same as preoperative diagnosis.       Procedure(s):  RIGHT MIDDLE FINGER TRIGGER RELEASE and cortisone injesction of right  elbow    Surgeon(s):  Mabel Post MD    Surgical Assistant: Surg Asst-1: Eustaquio Kayser, RN    Anesthesia: MAC     Estimated Blood Loss (mL): Minimal    Complications: None    Specimens: * No specimens in log *     Implants: * No implants in log *    Drains: * No LDAs found *    Findings: As Above    Electronically Signed by Tyrell Verma MD on 9/17/2021 at 9:38 AM

## 2021-09-17 NOTE — ANESTHESIA PREPROCEDURE EVALUATION
Relevant Problems   CARDIOVASCULAR   (+) HTN (hypertension), benign      GASTROINTESTINAL   (+) Esophageal reflux       Anesthetic History   No history of anesthetic complications            Review of Systems / Medical History  Patient summary reviewed and pertinent labs reviewed    Pulmonary  Within defined limits                 Neuro/Psych       CVA    Pertinent negatives: No TIA   Cardiovascular    Hypertension          CAD    Exercise tolerance: >4 METS     GI/Hepatic/Renal     GERD           Endo/Other        Arthritis     Other Findings              Physical Exam    Airway  Mallampati: III  TM Distance: 4 - 6 cm  Neck ROM: normal range of motion   Mouth opening: Normal     Cardiovascular    Rhythm: regular  Rate: normal         Dental  No notable dental hx       Pulmonary  Breath sounds clear to auscultation               Abdominal  GI exam deferred       Other Findings            Anesthetic Plan    ASA: 3  Anesthesia type: MAC          Induction: Intravenous  Anesthetic plan and risks discussed with: Patient

## 2021-09-17 NOTE — ANESTHESIA POSTPROCEDURE EVALUATION
Procedure(s):  RIGHT MIDDLE FINGER TRIGGER RELEASE WITH POSSIBLE ULNAR SLIP FLEXOR DIGITORUM SUPERFICIALIS EXCISION (MAC WITH LOCAL) and cortisone injesction of right  elbow. MAC    Anesthesia Post Evaluation      Multimodal analgesia: multimodal analgesia not used between 6 hours prior to anesthesia start to PACU discharge  Patient location during evaluation: PACU  Patient participation: complete - patient participated  Level of consciousness: awake  Pain management: adequate  Airway patency: patent  Anesthetic complications: no  Cardiovascular status: acceptable, blood pressure returned to baseline and hemodynamically stable  Respiratory status: acceptable  Hydration status: acceptable  Post anesthesia nausea and vomiting:  controlled      INITIAL Post-op Vital signs:   Vitals Value Taken Time   /54 09/17/21 0950   Temp 36.5 °C (97.7 °F) 09/17/21 0943   Pulse 52 09/17/21 0952   Resp 13 09/17/21 0952   SpO2 97 % 09/17/21 0952   Vitals shown include unvalidated device data.

## 2021-09-17 NOTE — DISCHARGE INSTRUCTIONS
DISCHARGE SUMMARY from your Nurse      PATIENT INSTRUCTIONS    After general anesthesia or intravenous sedation, for 24 hours or while taking prescription Narcotics:  · Limit your activities  · Do not drive and operate hazardous machinery  · Do not make important personal or business decisions  · Do  not drink alcoholic beverages  · If you have not urinated within 8 hours after discharge, please contact your surgeon on call. Report the following to your surgeon:  · Excessive pain, swelling, redness or odor of or around the surgical area  · Temperature over 100.5  · Nausea and vomiting lasting longer than 4 hours or if unable to take medications  · Any signs of decreased circulation or nerve impairment to extremity: change in color, persistent  numbness, tingling, coldness or increase pain  · Any questions      GOOD HELP TO FIGHT AN INFECTION  Here are a few tip to help reduce the chance of getting an infection after surgery:   Wash Your Hands   Good handwashing is the most important thing you and your caregiver can do.  Wash before and after caring for any wounds. Dry your hand with a clean towel.  Wash with soap and water for at least 20 seconds. A TIP: sing the \"Happy Birthday\" song through one time while washing to help with the timing.  Use a hand  in between washings.  Shower   When your surgeon says it is OK to take a shower, use a new bar of antibacterial soap (if that is what you use, and keep that bar of soap ONLY for your use), or antibacterial body wash.  Use a clean wash cloth or sponge when you bathe.  Dry off with a clean towel  after every bath - be careful around any wounds, skin staples, sutures or surgical glue over/on wounds.  Do not enter swimming pools, hot tubs, lakes, rivers and/or ocean until wounds are healed and your doctor/surgeon says it is OK.  Use Clean Sheets   Sleep on freshly laundered sheets after your surgery.    Keep the surgery site covered with a clean, dry bandage (if instructed to do so). If the bandage becomes soiled, reapply a new, dry, clean bandage.  Do not allow pets to sleep with you while your wound is healing.  Lifestyle Modification and Controlling Your Blood Sugar   Smoking slows wound healing. Stop smoking and limit exposure to second-hand smoke.  High blood sugar slows wound healing. Eat a well-balanced diet to provide proper nutrition while healing   Monitor your blood sugar (if you are a diabetic) and take your medications as you are suppose to so you can control you blood sugar after surgery. COUGH AND DEEP BREATHE    Breathing deeply and coughing are very important exercises to do after surgery. Deep breathing and coughing open the little air tubes and air sacks in your lungs. You take deep breaths every day. You may not even notice - it is just something you do when you sigh or yawn. It is a natural exercise you do to keep these air passages open. After surgery, take deep breaths and cough, on purpose. DIRECTIONS:  · Take 10 to 15 slow deep breaths every hour while awake. · Breathe in deeply, and hold it for 2 seconds. · Exhale slowly through puckered lips, like blowing up a balloon. · After every 4th or 5th deep breath, hug your pillow to your chest or belly and give a hard, deep cough. Yes, it will probably hurt. But doing this exercise is a very important part of healing after surgery. Take your pain medicine to help you do this exercise without too much pain. Coughing and deep breathing help prevent bronchitis and pneumonia after surgery. If you had chest or belly surgery, use a pillow as a \"hug klever\" and hold it tightly to your chest or belly when you cough. ANKLE PUMPS    Ankle pumps increase the circulation of oxygenated blood to your lower extremities and decrease your risk for circulation problems such as blood clots.  They also stretch the muscles, tendons and ligaments in your foot and ankle, and prevent joint contracture in the ankle and foot, especially after surgeries on the legs. It is important to do ankle pump exercises regularly after surgery because immobility increases your risk for developing a blood clot. Your doctor may also have you take an Aspirin for the next few days as well. If your doctor did not ask you to take an Aspirin, consult with him before starting Aspirin therapy on your own. The exercise is quite simple. · Slowly point your foot forward, feeling the muscles on the top of your lower leg stretch, and hold this position for 5 seconds. · Next, pull your foot back toward you as far as possible, stretching the calf muscles, and hold that position for 5 seconds. · Repeat with the other foot. · Perform 10 repetitions every hour while awake for both ankles if possible (down and then up with the foot once is one repetition). You should feel gentle stretching of the muscles in your lower leg when doing this exercise. If you feel pain, or your range of motion is limited, don't push too hard. Only go the limit your joint and muscles will let you go. If you have increasing pain, progressively worsening leg warmth or swelling, STOP the exercise and call your doctor. MEDICATION AND   SIDE EFFECT GUIDE    The [facility] MEDICATION AND SIDE EFFECT GUIDE was provided to the PATIENT AND CARE PROVIDER. Information provided includes instruction about drug purpose and common side effects for the following medications:   · Norco        These are general instructions for a healthy lifestyle:    *   Please give a list of your current medications to your Primary Care Provider. *   Please update this list whenever your medications are discontinued, doses are changed, or new medications (including over-the-counter products) are added.   *   Please carry medication information at all times in case of emergency situations. About Smoking  No smoking / No tobacco products  Avoid exposure to second hand smoke     Surgeon General's Warning:  Quitting smoking now greatly reduces serious risk to your health. Obesity, smoking, and sedentary lifestyle greatly increases your risk for illness and disease. A healthy diet, regular physical exercise & weight monitoring are important for maintaining a healthy lifestyle. Congestive Heart Failure  You may be retaining fluid if you have a history of heart failure or if you experience any of the following symptoms:  Weight gain of 3 pounds or more overnight or 5 pounds in a week, increased swelling in your hands or feet or shortness of breath while lying flat in bed. Please call your doctor as soon as you notice any of these symptoms; do not wait until your next office visit. Recognize signs and symptoms of STROKE:  F -  Face looks uneven  A -  Arms unable to move or move evenly  S -  Speech slurred or non-existent  T -  Time-call 911 as soon as signs and symptoms begin-DO NOT go          back to bed or wait to see if you get better-TIME IS BRAIN. Warning Signs of HEART ATTACK   Call 911 if you have these symptoms:     Chest discomfort. Most heart attacks involve discomfort in the center of the chest that lasts more than a few minutes, or that goes away and comes back. It can feel like uncomfortable pressure, squeezing, fullness, or pain.  Discomfort in other areas of the upper body. Symptoms can include pain or discomfort in one or both arms, the back, neck, jaw, or stomach.  Shortness of breath with or without chest discomfort.  Other signs may include breaking out in a cold sweat, nausea, or lightheadedness. Don't wait more than five minutes to call 911 - MINUTES MATTER! Fast action can save your life. Calling 911 is almost always the fastest way to get lifesaving treatment.  Emergency Medical Services staff can begin treatment when they arrive -- up to an hour sooner than if someone gets to the hospital by car. Learning About Coronavirus [phone])  Coronavirus [phone]): Overview  What is coronavirus (COVID-19)? The coronavirus disease (COVID-19) is caused by a virus. It is an illness that was first found in [place], [place], in December 2019. It has since spread worldwide. The virus can cause fever, cough, and trouble breathing. In severe cases, it can cause pneumonia and make it hard to breathe without help. It can cause death. Coronaviruses are a large group of viruses. They cause the common cold. They also cause more serious illnesses like Middle East respiratory syndrome (MERS) and severe acute respiratory syndrome (SARS). COVID-19 is caused by a novel coronavirus. That means it's a new type that has not been seen in people before. This virus spreads person-to-person through droplets from coughing and sneezing. It can also spread when you are close to someone who is infected. And it can spread when you touch something that has the virus on it, such as a doorknob or a tabletop. What can you do to protect yourself from coronavirus (COVID-19)? The best way to protect yourself from getting sick is to:  · Avoid areas where there is an outbreak. · Avoid contact with people who may be infected. · Wash your hands often with soap or alcohol-based hand sanitizers. · Avoid crowds and try to stay at least 6 feet away from other people. · Wash your hands often, especially after you cough or sneeze. Use soap and water, and scrub for at least 20 seconds. If soap and water aren't available, use an alcohol-based hand . · Avoid touching your mouth, nose, and eyes. What can you do to avoid spreading the virus to others? To help avoid spreading the virus to others:  · Cover your mouth with a tissue when you cough or sneeze. Then throw the tissue in the trash. · Use a disinfectant to clean things that you touch often.   · Stay home if you are sick or have been exposed to the virus. Don't go to school, work, or public areas. And don't use public transportation. · If you are sick:  ? Leave your home only if you need to get medical care. But call the doctor's office first so they know you're coming. And wear a face mask, if you have one.  ? If you have a face mask, wear it whenever you're around other people. It can help stop the spread of the virus when you cough or sneeze. ? Clean and disinfect your home every day. Use household  and disinfectant wipes or sprays. Take special care to clean things that you grab with your hands. These include doorknobs, remote controls, phones, and handles on your refrigerator and microwave. And don't forget countertops, tabletops, bathrooms, and computer keyboards. When to call for help  Call 911 anytime you think you may need emergency care. For example, call if:  · You have severe trouble breathing. (You can't talk at all.)  · You have constant chest pain or pressure. · You are severely dizzy or lightheaded. · You are confused or can't think clearly. · Your face and lips have a blue color. · You pass out (lose consciousness) or are very hard to wake up. Call your doctor now if you develop symptoms such as:  · Shortness of breath. · Fever. · Cough. If you need to get care, call ahead to the doctor's office for instructions before you go. Make sure you wear a face mask, if you have one, to prevent exposing other people to the virus. Where can you get the latest information? The following health organizations are tracking and studying this virus. Their websites contain the most up-to-date information. Salom[place] Martinez also learn what to do if you think you may have been exposed to the virus. · U.[place]. Centers for Disease Control and Prevention (CDC): The CDC provides updated news about the disease and travel advice. The website also tells you how to prevent the spread of infection.  www.cdc.gov  · [address] Organization (WHO): WHO offers information about the virus outbreaks. WHO also has travel advice. www.who.int  Current as of: April 1, 2020               Content Version: 12.4  © 2006-2020 Health[employer], Incorporated. Care instructions adapted under license by your healthcare professional. If you have questions about a medical condition or this instruction, always ask your healthcare professional. [address] any warranty or liability for your use of this information. The discharge information has been reviewed with the spouse. Any questions and concerns from the spouse have been addressed. The spouse verbalized understanding.         CONTENTS FOUND IN YOUR DISCHARGE ENVELOPE:  [x]     Surgeon and Hospital Discharge Instructions  [x]     [facility] Care Provider Card  [x]     Medication & Side Effect Guide            (your newly prescribed medications have been marked/highlighted showing the most common side effects from   the classes of drugs on your prescriptions)  [x]     Medication Prescription(s) x Norco ( [x] These have been sent electronically to your pharmacy by your surgeon,   - OR -       your surgeon has already provided these to you during a previous/pre-op office visit)  []    []     Follow-up Appointment Cards      The following personal items collected during your admission are returned to you:   Dental Appliance: Dental Appliances: None  Vision: Visual Aid: Glasses  Hearing Aid: Hearing Aid: Other (comment) (at home)  Jewelry:    Clothing:    Other Valuables:    Valuables sent to safe:

## 2021-09-20 NOTE — OP NOTES
[facility] 80  OPERATIVE REPORT    Name:  Ciarra Reyna  MR#:  [id number]  :  1940  ACCOUNT #:  [de-identified]  DATE OF SERVICE:  2021    PREOPERATIVE DIAGNOSIS:  Right middle finger trigger digit. POSTOPERATIVE DIAGNOSIS:  Right middle finger trigger digit. PROCEDURE PERFORMED:  Right middle finger A1 pulley release. SURGEON:  Gregory Azar MD    ASSISTANT:  Staff    ANESTHESIA:  Local with MAC. COMPLICATIONS:  None. SPECIMENS REMOVED:  None. IMPLANTS:  None. ESTIMATED BLOOD LOSS:  Minimal.    DRAINS:  None. DISPOSITION:  The patient was returned to the PACU in stable condition. INDICATIONS:  An [de-identified]year-old right-hand-dominant gentleman who has been experiencing catching and locking of his right middle finger. He was provided cortisone injection with temporary relief of his symptoms. After risks, benefits and alternatives were discussed with the patient, he elected to proceed with the aforementioned procedure. PROCEDURE:  The patient was identified in the preoperative holding area. His right middle finger was signed. He was brought back to the operating room. A formal time-out was called to identify the correct surgical site. He did receive preoperative antibiotics half an hour within the incision time. Prior to starting, the patient was provided digital block proximal to the right middle finger A1 pulley site using 1% lidocaine and 0.5% Marcaine plain approximately 7-8 mL. Then, the hand and arm were prepped and draped in the normal sterile fashion. A tourniquet was inflated up to 250 mmHg for a total tourniquet time of approximately 15 minutes. A slightly oblique incision was made over the right middle finger A1 pulley site coming through the skin and subcutaneous tissue. Skin hooks were placed and longitudinal spreads were made.   The neurovascular bundles on the radial and ulnar sides were identified and protected throughout the case with the retractors radially and ulnarly. Then, a third retractor was placed distally, and using a Gratiot blade, the A1 pulley was released in its entirety. Right angle retractor was used to pull the FDS and FDP tendons independently out of the wound. There were some adhesions between the tendons which were debrided. At this point, sedation was turned down. The patient was asked to actively flex and extend his digits and he was able to do so without any evidence of locking or catching involving the middle finger. At this point, the wound was thoroughly irrigated out and closed with 5-0 Prolene. Xeroform, 4x4's, Kerlix, and Coban were used to wrap the hand and thumb. The tourniquet was deflated. The patient was woken up in the operating room and returned to the PACU in stable condition.       Anh Galdamez MD      [id number]  D:  09/20/2021 4:56  T:  09/20/2021 11:42  JOB #:  [id number]

## 2021-09-20 NOTE — PERIOP NOTES
09/20/21, 3:30 PM    [facility]-Anesthesia Services Chart Audit - Patient Encounter Reviewed

## 2021-11-16 ENCOUNTER — HOSPITAL ENCOUNTER (OUTPATIENT)
Dept: PREADMISSION TESTING | Age: 81
Discharge: HOME | End: 2021-11-16
Payer: MEDICARE

## 2021-11-16 VITALS
WEIGHT: 150.1 LBS | DIASTOLIC BLOOD PRESSURE: 66 MMHG | SYSTOLIC BLOOD PRESSURE: 148 MMHG | TEMPERATURE: 97.1 F | BODY MASS INDEX: 23.56 KG/M2 | RESPIRATION RATE: 16 BRPM | OXYGEN SATURATION: 95 % | HEIGHT: 67 IN | HEART RATE: 57 BPM

## 2021-11-16 LAB
ANION GAP SERPL CALC-SCNC: 5 MMOL/L (ref 5–15)
BUN SERPL-MCNC: 22 MG/DL (ref 6–20)
BUN/CREAT SERPL: 23 (ref 12–20)
CALCIUM SERPL-MCNC: 9 MG/DL (ref 8.5–10.1)
CHLORIDE SERPL-SCNC: 103 MMOL/L (ref 97–108)
CO2 SERPL-SCNC: 30 MMOL/L (ref 21–32)
CREAT SERPL-MCNC: 0.94 MG/DL (ref 0.7–1.3)
GLUCOSE SERPL-MCNC: 97 MG/DL (ref 65–100)
POTASSIUM SERPL-SCNC: 4.2 MMOL/L (ref 3.5–5.1)
SODIUM SERPL-SCNC: 138 MMOL/L (ref 136–145)

## 2021-11-16 PROCEDURE — 80048 BASIC METABOLIC PNL TOTAL CA: CPT

## 2021-11-16 PROCEDURE — 36415 COLL VENOUS BLD VENIPUNCTURE: CPT

## 2021-11-16 PROCEDURE — 93005 ELECTROCARDIOGRAM TRACING: CPT

## 2021-11-16 PROCEDURE — U0005 INFEC AGEN DETEC AMPLI PROBE: HCPCS

## 2021-11-16 RX ORDER — TRAZODONE HYDROCHLORIDE 100 MG/1
50 TABLET ORAL
COMMUNITY

## 2021-11-16 NOTE — PERIOP NOTES
[address] [address] [facility]   PRE-ADMISSION TESTING    [phone]     Surgery Date:  11/19/2021 Friday      Is surgery arrival time given by surgeon? NO  If NO, [place] staff will call you between 3 and 7pm the day before your surgery with your arrival time. (If your surgery is on a Monday, we will call you the Friday before.)    Call [phone] after 7pm Monday-Friday if you did not receive this call. INSTRUCTIONS BEFORE YOUR SURGERY   When You  Arrive Arrive at the [address] [facility] on the day of your surgery  Have your insurance card, photo ID, and any copayment (if needed)   Food   and   Drink NO food or drink after midnight the night before surgery    This means NO water, gum, mints, coffee, juice, etc.  No alcohol (beer, wine, liquor) 24 hours before and after surgery   Medications to   TAKE   Morning of Surgery MEDICATIONS TO TAKE THE MORNING OF SURGERY WITH A SIP OF WATER:    Omeprazole   Alprazolam   Amlodipine     Medications  To  STOP      7 days before surgery  Non-Steroidal anti-inflammatory Drugs (NSAID's): for example, Ibuprofen (Advil, Motrin), Naproxen (Aleve)   Aspirin, if taking for pain    Herbal supplements, vitamins, and fish oil   Other:  (Pain medications not listed above, including Tylenol may be taken)   Blood  Thinners  If you take  Aspirin, Plavix, Coumadin, or any blood-thinning or anti-blood clot medicine, talk to the doctor who prescribed the medications for pre-operative instructions. Bathing Clothing  Jewelry  Valuables      If you shower the morning of surgery, please do not apply anything to your skin (lotions, powders, deodorant, or makeup, especially mascara)   Follow Chlorhexidine [employer] body wash instructions provided to you during PAT appointment. Begin 3 days prior to surgery.    Do not shave or trim anywhere 24 hours before surgery   Wear your hair loose or down; no pony-tails, buns, or metal hair clips   Wear loose, comfortable, clean clothes   Wear glasses instead of contacts  [address], valuables, and jewelry, including body piercings, at home   If you were given an trakkies [employer], bring it on day of surgery. Going Home - or Spending the Night  SAME-DAY SURGERY: You must have a responsible adult drive you home and stay with you 24 hours after surgery   ADMITS: If your doctor is keeping you in the hospital after surgery, leave personal belongings/luggage in your car until you have a hospital room number. Hospital discharge time is 12 noon  Drivers must be here before 12 noon unless you are told differently   Special Instructions        Follow all instructions so your surgery wont be cancelled. Please, be on time. If a situation occurs and you are delayed the day of surgery, call  [phone]. If your physical condition changes (like a fever, cold, flu, etc.) call your surgeon. Home medication(s) reviewed and verified verbally and with list during PAT appointment. The patient was contacted in person. The patient verbalizes understanding of all instructions and does not need reinforcement.

## 2021-11-16 NOTE — H&P
History and Physical    Patient: Lorie Rcok MRN: [id number]  SSN: [id number]    YOB: 1940  Age: [de-identified] y.o. Sex: male      CC: Right middle finger pain    Subjective:      Lorie Rock is a [de-identified] y.o. male referred for pre-operative evaluation by Dr. Olga Rich for surgery on 21. Alie Castanedarossy notes he had surgery to this finger in September and while he did well with surgery the finger and hand has remained with severe pain. He notes he has trouble touching the finger and he has throbbing at night. He is RHD. He is followed by Dr. Bill Moore for his heart health. He has had a CABG but denies any new CP or SOB. He did well with anesthesia in September. The patient was evaluated in the surgeon's office and it was determined that the most appropriate plan of care is to proceed with surgical intervention.   Patient's PCP Rosanna Valencia MD    Past Medical History:   Diagnosis Date    Anxiety     Arthritis     BPH (benign prostatic hyperplasia)     Chronic pain     Coronary artery disease     GERD (gastroesophageal reflux disease)     HTN (hypertension), benign     Rotator cuff tear     RIGHT     TIA (transient ischemic attack)      Past Surgical History:   Procedure Laterality Date    HX ADENOIDECTOMY      HX APPENDECTOMY      HX COLONOSCOPY      HX CORONARY ARTERY BYPASS GRAFT      HX ORTHOPAEDIC Right 2021    Middle finger trigger release    HX POLYPECTOMY      HX TONSILLECTOMY      HX VASECTOMY        Family History   Problem Relation Age of Onset    Cancer Mother         COLON    Cancer Father         BRAIN    Heart Disease Brother     Stroke Neg Hx     Anesth Problems Neg Hx      Social History     Tobacco Use    Smoking status: Former Smoker     Packs/day: 1.00     Years: 20.00     Pack years: 20.00     Types: Cigarettes     Quit date:      Years since quittin.9    Smokeless tobacco: Never Used   Vaping Use    Vaping Use: Never used   Substance Use Topics    Alcohol use: Yes     Alcohol/week: 7.0 standard drinks     Types: 7 Glasses of wine per week    Drug use: No      Prior to Admission medications    Medication Sig Start Date End Date Taking? Authorizing Provider   traZODone (DESYREL) 100 mg tablet Take 50 mg by mouth nightly. Yes Provider, Historical   vit A/vit C/vit E/zinc/copper (PRESERVISION AREDS PO) Take 1 Tablet by mouth daily. Yes Provider, Historical   omega-3/dha/epa/fish oil (OMEGA-3 FISH OIL PO) Take 500 mg by mouth daily. Yes Provider, Historical   ascorbic acid, vitamin C, (Vitamin C) 1,000 mg tablet Take 1,000 mg by mouth daily. Yes Provider, Historical   ALPRAZolam (XANAX) 0.5 mg tablet Take 0.5 mg by mouth daily. 4/7/20  Yes Provider, Historical   testosterone cypionate (DEPOTESTOTERONE CYPIONATE) 200 mg/mL injection 200 mg by IntraMUSCular route every fourteen (14) days. Yes Provider, Historical   amLODIPine (NORVASC) 2.5 mg tablet Take 2.5 mg by mouth two (2) times a day. Yes Provider, Historical   tamsulosin (FLOMAX) 0.4 mg capsule Take 0.4 mg by mouth nightly. Yes Provider, Historical   naproxen sodium (ALEVE PO) Take 1 Tablet by mouth as needed. Yes Provider, Historical   omeprazole (PRILOSEC) 40 mg capsule Take 40 mg by mouth daily (after breakfast). Yes Provider, Historical   aspirin 81 mg chewable tablet Take 81 mg by mouth daily. Yes Provider, Historical   atorvastatin (LIPITOR) 40 mg tablet Take 40 mg by mouth nightly. Yes Provider, Historical        Allergies   Allergen Reactions    Iodinated Contrast Media Shortness of Breath     Lung aspiration  Short of breath       Review of Systems:  Constitutional: Negative for chills and fever  HENT: Negative for congestion and sore throat  Eyes: negative for blurred vision and double vision  Respiratory: Negative for cough, shortness of breath and wheezing  Mouth: Negative for loose, broken or chipped teeth. Cardiovascular: Negative for chest pain and palpitations  Gastrointestinal: Negative for abdominal pain, constipation, diarrhea and nausea  Genitourinary: Negative for dysuria and hematuria  Musculoskeletal: Right hand pain  Skin: Negative for rash, open wounds. Neurological: Negative for dizziness, tremors and headaches  Psychiatric: Anxiety    Objective:     Vitals:    11/16/21 1510   BP: (!) 148/66   Pulse: (!) 57   Resp: 16   Temp: 97.1 °F (36.2 °C)   SpO2: 95%   Weight: 68.1 kg (150 lb 1.6 oz)   Height: 5' 7\" (1.702 m)        Physical Exam:  General Appearance: Alert, Well Appearing and in no distress  Mental Status: Normal mood, behavior, speech and dress  Neck: Normal appearance externally  Ears: External canal no drainage  Nose: Normal external appearance and no drainage   Chest: Clear to auscultation, no wheezes, rales or rhonchi  Heart: Normal rate, regular rhythm, no murmurs, rubs, clicks or gallops  Skin: Normal color, no lesions externally  Abdomen: Not examined  Neuro: Not examined  Musculoskeletal: Limited ROM to right middle finger    Recent Results (from the past 168 hour(s))   EKG, 12 LEAD, INITIAL    Collection Time: 11/16/21  2:45 PM   Result Value Ref Range    Ventricular Rate 53 BPM    Atrial Rate 53 BPM    P-R Interval 176 ms    QRS Duration 98 ms    Q-T Interval 424 ms    QTC Calculation (Bezet) 397 ms    Calculated P Axis 41 degrees    Calculated R Axis 31 degrees    Calculated T Axis 47 degrees    Diagnosis       Sinus bradycardia  Poor R-wave Progression  Abnormal ECG  When compared with ECG of 21-JAN-2020 05:56,  Vent.  rate has decreased BY  27 BPM  Confirmed by Familia Dsouza ([id number]) on 11/17/2021 0:89:25 PM     METABOLIC PANEL, BASIC    Collection Time: 11/16/21  3:36 PM   Result Value Ref Range    Sodium 138 136 - 145 mmol/L    Potassium 4.2 3.5 - 5.1 mmol/L    Chloride 103 97 - 108 mmol/L    CO2 30 21 - 32 mmol/L    Anion gap 5 5 - 15 mmol/L    Glucose 97 65 - 100 mg/dL    BUN 22 (H) 6 - 20 MG/DL    Creatinine 0.94 0.70 - 1.30 MG/DL    BUN/Creatinine ratio 23 (H) 12 - 20      GFR est AA >60 >60 ml/min/1.73m2    GFR est non-AA >60 >60 ml/min/1.73m2    Calcium 9.0 8.5 - 10.1 MG/DL   SARS-COV-2    Collection Time: 11/16/21  3:36 PM   Result Value Ref Range    Specimen source Nasopharyngeal      SARS-CoV-2 Not detected NOTD           Assessment:     Right hand pain  Pre-Operative Evaluation    Plan:     Right middle finger pulley release  Labs reviewed  EKG from previous surgery reviewed      Signed By: Shelby Collazo NP     November 18, 2021

## 2021-11-17 LAB
ATRIAL RATE: 53 BPM
CALCULATED P AXIS, ECG09: 41 DEGREES
CALCULATED R AXIS, ECG10: 31 DEGREES
CALCULATED T AXIS, ECG11: 47 DEGREES
DIAGNOSIS, 93000: NORMAL
P-R INTERVAL, ECG05: 176 MS
Q-T INTERVAL, ECG07: 424 MS
QRS DURATION, ECG06: 98 MS
QTC CALCULATION (BEZET), ECG08: 397 MS
SARS-COV-2, XPLCVT: NOT DETECTED
SOURCE, COVRS: NORMAL
VENTRICULAR RATE, ECG03: 53 BPM

## 2021-11-18 ENCOUNTER — ANESTHESIA EVENT (OUTPATIENT)
Dept: SURGERY | Age: 81
End: 2021-11-18
Payer: MEDICARE

## 2021-11-19 ENCOUNTER — HOSPITAL ENCOUNTER (OUTPATIENT)
Age: 81
Setting detail: OUTPATIENT SURGERY
Discharge: HOME | End: 2021-11-19
Attending: ORTHOPAEDIC SURGERY | Admitting: ORTHOPAEDIC SURGERY
Payer: MEDICARE

## 2021-11-19 ENCOUNTER — ANESTHESIA (OUTPATIENT)
Dept: SURGERY | Age: 81
End: 2021-11-19
Payer: MEDICARE

## 2021-11-19 VITALS
RESPIRATION RATE: 10 BRPM | BODY MASS INDEX: 22.87 KG/M2 | HEART RATE: 43 BPM | SYSTOLIC BLOOD PRESSURE: 129 MMHG | OXYGEN SATURATION: 98 % | WEIGHT: 145.72 LBS | DIASTOLIC BLOOD PRESSURE: 60 MMHG | TEMPERATURE: 97.8 F | HEIGHT: 67 IN

## 2021-11-19 DIAGNOSIS — G89.18 POST-OPERATIVE PAIN: Primary | ICD-10-CM

## 2021-11-19 PROCEDURE — 76030000001 HC AMB SURG OR TIME 1 TO 1.5: Performed by: ORTHOPAEDIC SURGERY

## 2021-11-19 PROCEDURE — 74011000250 HC RX REV CODE- 250: Performed by: ORTHOPAEDIC SURGERY

## 2021-11-19 PROCEDURE — 77030040361 HC SLV COMPR DVT MDII -B

## 2021-11-19 PROCEDURE — 76210000057 HC AMBSU PH II REC 1 TO 1.5 HR: Performed by: ORTHOPAEDIC SURGERY

## 2021-11-19 PROCEDURE — 77030000032 HC CUF TRNQT ZIMM -B: Performed by: ORTHOPAEDIC SURGERY

## 2021-11-19 PROCEDURE — 77030006689 HC BLD OPHTH BVR BD -A: Performed by: ORTHOPAEDIC SURGERY

## 2021-11-19 PROCEDURE — 74011250636 HC RX REV CODE- 250/636: Performed by: NURSE ANESTHETIST, CERTIFIED REGISTERED

## 2021-11-19 PROCEDURE — 2709999900 HC NON-CHARGEABLE SUPPLY: Performed by: ORTHOPAEDIC SURGERY

## 2021-11-19 PROCEDURE — 88342 IMHCHEM/IMCYTCHM 1ST ANTB: CPT

## 2021-11-19 PROCEDURE — 76060000062 HC AMB SURG ANES 1 TO 1.5 HR: Performed by: ORTHOPAEDIC SURGERY

## 2021-11-19 PROCEDURE — 77030040922 HC BLNKT HYPOTHRM STRY -A

## 2021-11-19 PROCEDURE — 77030002974 HC SUT PLN J&J -A: Performed by: ORTHOPAEDIC SURGERY

## 2021-11-19 PROCEDURE — 88307 TISSUE EXAM BY PATHOLOGIST: CPT

## 2021-11-19 PROCEDURE — 74011250636 HC RX REV CODE- 250/636: Performed by: ANESTHESIOLOGY

## 2021-11-19 PROCEDURE — 74011000250 HC RX REV CODE- 250: Performed by: NURSE ANESTHETIST, CERTIFIED REGISTERED

## 2021-11-19 PROCEDURE — 77030002986 HC SUT PROL J&J -A: Performed by: ORTHOPAEDIC SURGERY

## 2021-11-19 PROCEDURE — 74011250636 HC RX REV CODE- 250/636: Performed by: ORTHOPAEDIC SURGERY

## 2021-11-19 PROCEDURE — 88341 IMHCHEM/IMCYTCHM EA ADD ANTB: CPT

## 2021-11-19 RX ORDER — HYDROMORPHONE HYDROCHLORIDE 1 MG/ML
.25-1 INJECTION, SOLUTION INTRAMUSCULAR; INTRAVENOUS; SUBCUTANEOUS
Status: DISCONTINUED | OUTPATIENT
Start: 2021-11-19 | End: 2021-11-19 | Stop reason: HOSPADM

## 2021-11-19 RX ORDER — SODIUM CHLORIDE, SODIUM LACTATE, POTASSIUM CHLORIDE, CALCIUM CHLORIDE 600; 310; 30; 20 MG/100ML; MG/100ML; MG/100ML; MG/100ML
125 INJECTION, SOLUTION INTRAVENOUS CONTINUOUS
Status: DISCONTINUED | OUTPATIENT
Start: 2021-11-19 | End: 2021-11-19 | Stop reason: HOSPADM

## 2021-11-19 RX ORDER — DIPHENHYDRAMINE HYDROCHLORIDE 50 MG/ML
12.5 INJECTION, SOLUTION INTRAMUSCULAR; INTRAVENOUS AS NEEDED
Status: DISCONTINUED | OUTPATIENT
Start: 2021-11-19 | End: 2021-11-19 | Stop reason: HOSPADM

## 2021-11-19 RX ORDER — NALOXONE HYDROCHLORIDE 0.4 MG/ML
0.2 INJECTION, SOLUTION INTRAMUSCULAR; INTRAVENOUS; SUBCUTANEOUS
Status: DISCONTINUED | OUTPATIENT
Start: 2021-11-19 | End: 2021-11-19 | Stop reason: HOSPADM

## 2021-11-19 RX ORDER — LIDOCAINE HYDROCHLORIDE 20 MG/ML
INJECTION, SOLUTION EPIDURAL; INFILTRATION; INTRACAUDAL; PERINEURAL AS NEEDED
Status: DISCONTINUED | OUTPATIENT
Start: 2021-11-19 | End: 2021-11-19 | Stop reason: HOSPADM

## 2021-11-19 RX ORDER — PROPOFOL 10 MG/ML
INJECTION, EMULSION INTRAVENOUS
Status: DISCONTINUED | OUTPATIENT
Start: 2021-11-19 | End: 2021-11-19 | Stop reason: HOSPADM

## 2021-11-19 RX ORDER — LIDOCAINE HYDROCHLORIDE 10 MG/ML
0.1 INJECTION, SOLUTION EPIDURAL; INFILTRATION; INTRACAUDAL; PERINEURAL AS NEEDED
Status: DISCONTINUED | OUTPATIENT
Start: 2021-11-19 | End: 2021-11-19 | Stop reason: HOSPADM

## 2021-11-19 RX ORDER — PROPOFOL 10 MG/ML
INJECTION, EMULSION INTRAVENOUS AS NEEDED
Status: DISCONTINUED | OUTPATIENT
Start: 2021-11-19 | End: 2021-11-19 | Stop reason: HOSPADM

## 2021-11-19 RX ORDER — HYDROCODONE BITARTRATE AND ACETAMINOPHEN 5; 325 MG/1; MG/1
1 TABLET ORAL
Qty: 15 TABLET | Refills: 0 | Status: SHIPPED | OUTPATIENT
Start: 2021-11-19 | End: 2021-11-22

## 2021-11-19 RX ORDER — FENTANYL CITRATE 50 UG/ML
INJECTION, SOLUTION INTRAMUSCULAR; INTRAVENOUS AS NEEDED
Status: DISCONTINUED | OUTPATIENT
Start: 2021-11-19 | End: 2021-11-19 | Stop reason: HOSPADM

## 2021-11-19 RX ORDER — MIDAZOLAM HYDROCHLORIDE 1 MG/ML
INJECTION, SOLUTION INTRAMUSCULAR; INTRAVENOUS AS NEEDED
Status: DISCONTINUED | OUTPATIENT
Start: 2021-11-19 | End: 2021-11-19 | Stop reason: HOSPADM

## 2021-11-19 RX ORDER — FLUMAZENIL 0.1 MG/ML
0.2 INJECTION INTRAVENOUS
Status: DISCONTINUED | OUTPATIENT
Start: 2021-11-19 | End: 2021-11-19 | Stop reason: HOSPADM

## 2021-11-19 RX ADMIN — CEFAZOLIN SODIUM 2 G: 1 POWDER, FOR SOLUTION INTRAMUSCULAR; INTRAVENOUS at 09:09

## 2021-11-19 RX ADMIN — SODIUM CHLORIDE, POTASSIUM CHLORIDE, SODIUM LACTATE AND CALCIUM CHLORIDE: 600; 310; 30; 20 INJECTION, SOLUTION INTRAVENOUS at 08:54

## 2021-11-19 RX ADMIN — FENTANYL CITRATE 50 MCG: 50 INJECTION, SOLUTION INTRAMUSCULAR; INTRAVENOUS at 09:26

## 2021-11-19 RX ADMIN — PROPOFOL 40 MG: 10 INJECTION, EMULSION INTRAVENOUS at 09:25

## 2021-11-19 RX ADMIN — PROPOFOL 30 MG: 10 INJECTION, EMULSION INTRAVENOUS at 09:03

## 2021-11-19 RX ADMIN — FENTANYL CITRATE 50 MCG: 50 INJECTION, SOLUTION INTRAMUSCULAR; INTRAVENOUS at 08:58

## 2021-11-19 RX ADMIN — MIDAZOLAM 1 MG: 1 INJECTION, SOLUTION INTRAMUSCULAR; INTRAVENOUS at 08:58

## 2021-11-19 RX ADMIN — PROPOFOL 50 MCG/KG/MIN: 10 INJECTION, EMULSION INTRAVENOUS at 09:05

## 2021-11-19 RX ADMIN — LIDOCAINE HYDROCHLORIDE 60 MG: 20 INJECTION, SOLUTION INTRAVENOUS at 09:03

## 2021-11-19 NOTE — BRIEF OP NOTE
Brief Postoperative Note    Patient: Thurmon Schlatter  YOB: 1940  MRN: [id number]    Date of Procedure: 11/19/2021     Pre-Op Diagnosis: Trigger middle finger of right hand [M65.331]  Ganglion of right hand [M67.441]; stiffness of right MF    Post-Op Diagnosis: Same as preoperative diagnosis.       Procedure(s):  RIGHT MIDDLE FINGER RETINACULAR CYST EXCISION; RIGHT MIDDLE FINGER FLEXOR TENDON TENOLYSIS, ULNAR SLIP FDS EXICISION(MAC WITH LOCAL)    Surgeon(s):  Almita Denis MD    Surgical Assistant: Surg Asst-1: Maryuri Hernadez    Anesthesia: MAC     Estimated Blood Loss (mL): Minimal    Complications: None    Specimens:   ID Type Source Tests Collected by Time Destination   1 : Right Middle Finger Mass from [facility] [address] [place] [address], Right  Almita Denis MD 11/19/2021 9538 Pathology        Implants: * No implants in log *    Drains: * No LDAs found *    Findings: As Above    Electronically Signed by Soren Mercado MD on 11/19/2021 at 10:22 AM

## 2021-11-19 NOTE — ANESTHESIA POSTPROCEDURE EVALUATION
Procedure(s):  RIGHT MIDDLE FINGER RETINACULAR CYST EXCISION; RIGHT MIDDLE FINGER FLEXOR TENDON TENOLYSIS, ULNAR SLOP FDS EXICISION(MAC WITH LOCAL).     MAC    Anesthesia Post Evaluation      Multimodal analgesia: multimodal analgesia used between 6 hours prior to anesthesia start to PACU discharge  Patient location during evaluation: bedside  Patient participation: complete - patient participated  Level of consciousness: awake  Pain management: adequate  Airway patency: patent  Anesthetic complications: no  Cardiovascular status: acceptable  Respiratory status: acceptable  Hydration status: acceptable        INITIAL Post-op Vital signs:   Vitals Value Taken Time   /60 11/19/21 1055   Temp 36.6 °C (97.8 °F) 11/19/21 1055   Pulse 43 11/19/21 1100   Resp 10 11/19/21 1100   SpO2 98 % 11/19/21 1100

## 2021-11-19 NOTE — ANESTHESIA PREPROCEDURE EVALUATION
Relevant Problems   CARDIOVASCULAR   (+) HTN (hypertension), benign      GASTROINTESTINAL   (+) Esophageal reflux       Anesthetic History   No history of anesthetic complications            Review of Systems / Medical History  Patient summary reviewed and pertinent labs reviewed    Pulmonary  Within defined limits                 Neuro/Psych       CVA (2000)  Psychiatric history  Pertinent negatives: No TIA   Cardiovascular    Hypertension          CAD, CABG (1986) and hyperlipidemia    Exercise tolerance: >4 METS     GI/Hepatic/Renal     GERD: well controlled           Endo/Other        Arthritis     Other Findings              Physical Exam    Airway  Mallampati: III  TM Distance: 4 - 6 cm  Neck ROM: normal range of motion   Mouth opening: Normal     Cardiovascular    Rhythm: regular  Rate: normal         Dental    Dentition: Implants  Comments: Top all implants   Pulmonary  Breath sounds clear to auscultation               Abdominal  GI exam deferred       Other Findings            Anesthetic Plan    ASA: 3  Anesthesia type: MAC          Induction: Intravenous  Anesthetic plan and risks discussed with: Patient

## 2021-11-19 NOTE — DISCHARGE INSTRUCTIONS
DISCHARGE SUMMARY from your Nurse      PATIENT INSTRUCTIONS    After general anesthesia or intravenous sedation, for 24 hours or while taking prescription Narcotics:  · Limit your activities  · Do not drive and operate hazardous machinery  · Do not make important personal or business decisions  · Do  not drink alcoholic beverages  · If you have not urinated within 8 hours after discharge, please contact your surgeon on call. Report the following to your surgeon:  · Excessive pain, swelling, redness or odor of or around the surgical area  · Temperature over 100.5  · Nausea and vomiting lasting longer than 4 hours or if unable to take medications  · Any signs of decreased circulation or nerve impairment to extremity: change in color, persistent  numbness, tingling, coldness or increase pain  · Any questions      GOOD HELP TO FIGHT AN INFECTION  Here are a few tip to help reduce the chance of getting an infection after surgery:   Wash Your Hands   Good handwashing is the most important thing you and your caregiver can do.  Wash before and after caring for any wounds. Dry your hand with a clean towel.  Wash with soap and water for at least 20 seconds. A TIP: sing the \"Happy Birthday\" song through one time while washing to help with the timing.  Use a hand  in between washings.  Shower   When your surgeon says it is OK to take a shower, use a new bar of antibacterial soap (if that is what you use, and keep that bar of soap ONLY for your use), or antibacterial body wash.  Use a clean wash cloth or sponge when you bathe.  Dry off with a clean towel  after every bath - be careful around any wounds, skin staples, sutures or surgical glue over/on wounds.  Do not enter swimming pools, hot tubs, lakes, rivers and/or ocean until wounds are healed and your doctor/surgeon says it is OK.  Use Clean Sheets   Sleep on freshly laundered sheets after your surgery.    Keep the surgery site covered with a clean, dry bandage (if instructed to do so). If the bandage becomes soiled, reapply a new, dry, clean bandage.  Do not allow pets to sleep with you while your wound is healing.  Lifestyle Modification and Controlling Your Blood Sugar   Smoking slows wound healing. Stop smoking and limit exposure to second-hand smoke.  High blood sugar slows wound healing. Eat a well-balanced diet to provide proper nutrition while healing   Monitor your blood sugar (if you are a diabetic) and take your medications as you are suppose to so you can control you blood sugar after surgery. COUGH AND DEEP BREATHE    Breathing deeply and coughing are very important exercises to do after surgery. Deep breathing and coughing open the little air tubes and air sacks in your lungs. You take deep breaths every day. You may not even notice - it is just something you do when you sigh or yawn. It is a natural exercise you do to keep these air passages open. After surgery, take deep breaths and cough, on purpose. DIRECTIONS:  · Take 10 to 15 slow deep breaths every hour while awake. · Breathe in deeply, and hold it for 2 seconds. · Exhale slowly through puckered lips, like blowing up a balloon. · After every 4th or 5th deep breath, hug your pillow to your chest or belly and give a hard, deep cough. Yes, it will probably hurt. But doing this exercise is a very important part of healing after surgery. Take your pain medicine to help you do this exercise without too much pain. Coughing and deep breathing help prevent bronchitis and pneumonia after surgery. If you had chest or belly surgery, use a pillow as a \"hug klever\" and hold it tightly to your chest or belly when you cough. ANKLE PUMPS    Ankle pumps increase the circulation of oxygenated blood to your lower extremities and decrease your risk for circulation problems such as blood clots.  They also stretch the muscles, tendons and ligaments in your foot and ankle, and prevent joint contracture in the ankle and foot, especially after surgeries on the legs. It is important to do ankle pump exercises regularly after surgery because immobility increases your risk for developing a blood clot. Your doctor may also have you take an Aspirin for the next few days as well. If your doctor did not ask you to take an Aspirin, consult with him before starting Aspirin therapy on your own. The exercise is quite simple. · Slowly point your foot forward, feeling the muscles on the top of your lower leg stretch, and hold this position for 5 seconds. · Next, pull your foot back toward you as far as possible, stretching the calf muscles, and hold that position for 5 seconds. · Repeat with the other foot. · Perform 10 repetitions every hour while awake for both ankles if possible (down and then up with the foot once is one repetition). You should feel gentle stretching of the muscles in your lower leg when doing this exercise. If you feel pain, or your range of motion is limited, don't push too hard. Only go the limit your joint and muscles will let you go. If you have increasing pain, progressively worsening leg warmth or swelling, STOP the exercise and call your doctor. MEDICATION AND   SIDE EFFECT GUIDE    The [facility] MEDICATION AND SIDE EFFECT GUIDE was provided to the PATIENT AND CARE PROVIDER. Information provided includes instruction about drug purpose and common side effects for the following medications:   · Norco        These are general instructions for a healthy lifestyle:    *   Please give a list of your current medications to your Primary Care Provider. *   Please update this list whenever your medications are discontinued, doses are changed, or new medications (including over-the-counter products) are added.   *   Please carry medication information at all times in case of emergency situations. About Smoking  No smoking / No tobacco products  Avoid exposure to second hand smoke     Surgeon General's Warning:  Quitting smoking now greatly reduces serious risk to your health. Obesity, smoking, and sedentary lifestyle greatly increases your risk for illness and disease. A healthy diet, regular physical exercise & weight monitoring are important for maintaining a healthy lifestyle. Congestive Heart Failure  You may be retaining fluid if you have a history of heart failure or if you experience any of the following symptoms:  Weight gain of 3 pounds or more overnight or 5 pounds in a week, increased swelling in your hands or feet or shortness of breath while lying flat in bed. Please call your doctor as soon as you notice any of these symptoms; do not wait until your next office visit. Recognize signs and symptoms of STROKE:  F -  Face looks uneven  A -  Arms unable to move or move evenly  S -  Speech slurred or non-existent  T -  Time-call 911 as soon as signs and symptoms begin-DO NOT go          back to bed or wait to see if you get better-TIME IS BRAIN. Warning Signs of HEART ATTACK   Call 911 if you have these symptoms:     Chest discomfort. Most heart attacks involve discomfort in the center of the chest that lasts more than a few minutes, or that goes away and comes back. It can feel like uncomfortable pressure, squeezing, fullness, or pain.  Discomfort in other areas of the upper body. Symptoms can include pain or discomfort in one or both arms, the back, neck, jaw, or stomach.  Shortness of breath with or without chest discomfort.  Other signs may include breaking out in a cold sweat, nausea, or lightheadedness. Don't wait more than five minutes to call 911 - MINUTES MATTER! Fast action can save your life. Calling 911 is almost always the fastest way to get lifesaving treatment.  Emergency Medical Services staff can begin treatment when they arrive -- up to an hour sooner than if someone gets to the hospital by car. Learning About Coronavirus [phone])  Coronavirus [phone]): Overview  What is coronavirus (COVID-19)? The coronavirus disease (COVID-19) is caused by a virus. It is an illness that was first found in [place], [place], in December 2019. It has since spread worldwide. The virus can cause fever, cough, and trouble breathing. In severe cases, it can cause pneumonia and make it hard to breathe without help. It can cause death. Coronaviruses are a large group of viruses. They cause the common cold. They also cause more serious illnesses like Middle East respiratory syndrome (MERS) and severe acute respiratory syndrome (SARS). COVID-19 is caused by a novel coronavirus. That means it's a new type that has not been seen in people before. This virus spreads person-to-person through droplets from coughing and sneezing. It can also spread when you are close to someone who is infected. And it can spread when you touch something that has the virus on it, such as a doorknob or a tabletop. What can you do to protect yourself from coronavirus (COVID-19)? The best way to protect yourself from getting sick is to:  · Avoid areas where there is an outbreak. · Avoid contact with people who may be infected. · Wash your hands often with soap or alcohol-based hand sanitizers. · Avoid crowds and try to stay at least 6 feet away from other people. · Wash your hands often, especially after you cough or sneeze. Use soap and water, and scrub for at least 20 seconds. If soap and water aren't available, use an alcohol-based hand . · Avoid touching your mouth, nose, and eyes. What can you do to avoid spreading the virus to others? To help avoid spreading the virus to others:  · Cover your mouth with a tissue when you cough or sneeze. Then throw the tissue in the trash. · Use a disinfectant to clean things that you touch often.   · Stay home if you are sick or have been exposed to the virus. Don't go to school, work, or public areas. And don't use public transportation. · If you are sick:  ? Leave your home only if you need to get medical care. But call the doctor's office first so they know you're coming. And wear a face mask, if you have one.  ? If you have a face mask, wear it whenever you're around other people. It can help stop the spread of the virus when you cough or sneeze. ? Clean and disinfect your home every day. Use household  and disinfectant wipes or sprays. Take special care to clean things that you grab with your hands. These include doorknobs, remote controls, phones, and handles on your refrigerator and microwave. And don't forget countertops, tabletops, bathrooms, and computer keyboards. When to call for help  Call 911 anytime you think you may need emergency care. For example, call if:  · You have severe trouble breathing. (You can't talk at all.)  · You have constant chest pain or pressure. · You are severely dizzy or lightheaded. · You are confused or can't think clearly. · Your face and lips have a blue color. · You pass out (lose consciousness) or are very hard to wake up. Call your doctor now if you develop symptoms such as:  · Shortness of breath. · Fever. · Cough. If you need to get care, call ahead to the doctor's office for instructions before you go. Make sure you wear a face mask, if you have one, to prevent exposing other people to the virus. Where can you get the latest information? The following health organizations are tracking and studying this virus. Their websites contain the most up-to-date information. Baljeet Ao also learn what to do if you think you may have been exposed to the virus. · U.S. Centers for Disease Control and Prevention (CDC): The CDC provides updated news about the disease and travel advice. The website also tells you how to prevent the spread of infection.  www.cdc.gov  · [employer] [address] Organization (WHO): WHO offers information about the virus outbreaks. WHO also has travel advice. www.who.int  Current as of: April 1, 2020               Content Version: 12.4  © 2006-2020 [employer]. Care instructions adapted under license by your healthcare professional. If you have questions about a medical condition or this instruction, always ask your healthcare professional. [address] any warranty or liability for your use of this information. The discharge information has been reviewed with the {PATIENT PARENT GUARDIAN:94019}. Any questions and concerns from the {PATIENT PARENT GUARDIAN:04775} have been addressed. The {PATIENT PARENT GUARDIAN:82211} verbalized understanding. CONTENTS FOUND IN YOUR DISCHARGE ENVELOPE:  [x]     Surgeon and Hospital Discharge Instructions  [x]     [facility] Care Provider Card  [x]     Medication & Side Effect Guide            (your newly prescribed medications have been marked/highlighted showing the most common side effects from   the classes of drugs on your prescriptions)  [x]     Medication Prescription(s) x *** ( [x] These have been sent electronically to your pharmacy by your surgeon,   - OR -       your surgeon has already provided these to you during a previous/pre-op office visit)  []     [address]  []     Physical Therapy Prescription  []     Follow-up Appointment Cards  []     Surgery-related Pictures/Media  []     Pain block and/or block with On-Q Catheter from Anesthesia Service (information included in your instructions above)  []     Medical device information sheets/pamphlets from their    []     School/work excuse note. []     /parent work excuse note.       The following personal items collected during your admission are returned to you:   Dental Appliance: Dental Appliances: None  Vision: Visual Aid: None  Hearing Aid:    Jewelry:    Clothing:    Other Valuables:    Valuables sent to safe:

## 2021-11-25 NOTE — OP NOTES
Rick Chavira [facility] 80  OPERATIVE REPORT    Name:  Kaylee Luther  MR#:  [id number]  :  1940  ACCOUNT #:  [de-identified]  DATE OF SERVICE:  2021    PREOPERATIVE DIAGNOSES:  Right middle finger retinacular cyst as well as right middle finger stiffness. POSTOPERATIVE DIAGNOSES:  Right middle finger retinacular cyst as well as right middle finger stiffness. PROCEDURES PERFORMED:  1. Right middle finger retinacular cyst excision. 2.  Right middle finger FDP tenolysis. 3.  Right middle finger FDS ulnar slip excision. SURGEON:  Brown Michelle MD    ASSISTANT:  Staff. ANESTHESIA:  Local and MAC. COMPLICATIONS:  None. SPECIMENS REMOVED:  None. IMPLANTS:  None. ESTIMATED BLOOD LOSS:  Minimal.    DRAINS:  None. DISPOSITION:  The patient was returned to the PACU in stable condition. INDICATIONS:  This is an 81-year-old gentleman who had previously undergone a right middle finger pulley release back on 2021 and initially did well and then developed a retinacular cyst which was very painful for him. We attempted aspiration, however, he had difficulty moving the finger and became stiff and very painful. After risks, benefits and alternatives were discussed with the patient, he elected to proceed with the aforementioned procedure. PROCEDURE:  The patient was identified in preoperative holding area. His right middle finger was signed. He was given a wrist block as well as block proximal to the A1 pulley site using 1% lidocaine, 0.5% Marcaine plain after the arm was prepped and draped in normal sterile fashion. A tourniquet was inflated up to 250 mmHg. A Brunner-type incision was made over the right middle finger, centered over the MCP flexion crease coming through the skin and subcutaneous tissue. Skin hooks were placed and dissection was carried out.   The neurovascular bundle on the radial and ulnar sides were identified and there did appear to be a cyst although it was small around the MCP flexion crease, close to the digital artery and nerve which was teased off and excised. There also appeared to be some fullness underneath the A2 pulley site. A cyst within the pulley which was also excised out with an elliptical release. Then passively flexed and extended the fingers and it appeared that the tendons were stuck together, the FDS and FDP tendons. We extended the Brunner incision proximally and there appeared to be adhesions between the tendons which were carefully released between the tendons. Overall, there still appeared to be a fullness with the tendons coming underneath the pulleys initially. Even at his initial surgery, there was concern as he had been stuck in a sort of flexed posture and at this point, decided to go ahead and take out the ulnar slip of his FDS which was discussed with him at the time of his first surgery. We made a small horizontal splint at the A3 pulley. The ulnar slip of the FDS tendon was identified proximally and released and then was found distally and pulled out and excised. At this point, the finger was passively flexed and extended and there was good room underneath the pulleys. There was independent excursion of the FDS and FDP tendons. At this point, the wounds were thoroughly irrigated out. Tourniquet was deflated. Hemostasis was achieved using bipolar electrocautery and the wound was closed with 4-0 Prolene. Xeroform, 4x4s, cast padding and Ace wrap were used to wrap the hand and fingers. The patient was then woken up in the operating room, returned to PACU in stable condition. All needle and instrument counts were accounted for.       Marleen Matthews MD      [id number]  D:  11/24/2021 15:24  T:  11/25/2021 0:11  JOB #:  [id number]

## 2022-03-19 PROBLEM — F41.9 ANXIETY: Status: ACTIVE | Noted: 2020-01-21

## 2022-03-19 PROBLEM — R55 SYNCOPE: Status: ACTIVE | Noted: 2020-01-21

## 2022-03-20 PROBLEM — R79.89 POSITIVE D DIMER: Status: ACTIVE | Noted: 2020-01-21

## 2022-03-20 PROBLEM — M75.121 NONTRAUMATIC COMPLETE TEAR OF RIGHT ROTATOR CUFF: Status: ACTIVE | Noted: 2019-10-15

## 2022-03-20 PROBLEM — J11.1 INFLUENZA: Status: ACTIVE | Noted: 2020-01-24

## 2022-03-20 PROBLEM — N40.0 BPH (BENIGN PROSTATIC HYPERPLASIA): Status: ACTIVE | Noted: 2020-01-21

## 2022-09-02 ENCOUNTER — APPOINTMENT (OUTPATIENT)
Dept: MRI IMAGING | Age: 82
End: 2022-09-02
Attending: EMERGENCY MEDICINE
Payer: MEDICARE

## 2022-09-02 ENCOUNTER — HOSPITAL ENCOUNTER (OUTPATIENT)
Age: 82
Setting detail: OBSERVATION
Discharge: HOME HEALTH | End: 2022-09-04
Attending: EMERGENCY MEDICINE | Admitting: INTERNAL MEDICINE
Payer: MEDICARE

## 2022-09-02 DIAGNOSIS — R26.2 UNABLE TO WALK: ICD-10-CM

## 2022-09-02 DIAGNOSIS — R33.9 URINARY RETENTION: ICD-10-CM

## 2022-09-02 DIAGNOSIS — M54.42 ACUTE LEFT-SIDED LOW BACK PAIN WITH LEFT-SIDED SCIATICA: Primary | ICD-10-CM

## 2022-09-02 PROBLEM — M75.121 NONTRAUMATIC COMPLETE TEAR OF RIGHT ROTATOR CUFF: Status: RESOLVED | Noted: 2019-10-15 | Resolved: 2022-09-02

## 2022-09-02 PROBLEM — M19.90 ARTHRITIS: Status: ACTIVE | Noted: 2022-09-02

## 2022-09-02 PROBLEM — G89.29 CHRONIC PAIN: Status: ACTIVE | Noted: 2022-09-02

## 2022-09-02 PROBLEM — R55 SYNCOPE: Status: RESOLVED | Noted: 2020-01-21 | Resolved: 2022-09-02

## 2022-09-02 PROBLEM — R79.89 POSITIVE D DIMER: Status: RESOLVED | Noted: 2020-01-21 | Resolved: 2022-09-02

## 2022-09-02 PROBLEM — J11.1 INFLUENZA: Status: RESOLVED | Noted: 2020-01-24 | Resolved: 2022-09-02

## 2022-09-02 PROBLEM — M54.50 ACUTE LUMBAR BACK PAIN: Status: ACTIVE | Noted: 2022-09-02

## 2022-09-02 PROBLEM — M54.9 BACK PAIN: Status: ACTIVE | Noted: 2022-09-02

## 2022-09-02 LAB
ALBUMIN SERPL-MCNC: 3.7 G/DL (ref 3.5–5)
ALBUMIN/GLOB SERPL: 1.2 {RATIO} (ref 1.1–2.2)
ALP SERPL-CCNC: 72 U/L (ref 45–117)
ALT SERPL-CCNC: 26 U/L (ref 12–78)
ANION GAP SERPL CALC-SCNC: 6 MMOL/L (ref 5–15)
APPEARANCE UR: CLEAR
AST SERPL-CCNC: 15 U/L (ref 15–37)
BACTERIA URNS QL MICRO: NEGATIVE /HPF
BASOPHILS # BLD: 0 K/UL (ref 0–0.1)
BASOPHILS NFR BLD: 0 % (ref 0–1)
BILIRUB SERPL-MCNC: 0.5 MG/DL (ref 0.2–1)
BILIRUB UR QL: NEGATIVE
BUN SERPL-MCNC: 22 MG/DL (ref 6–20)
BUN/CREAT SERPL: 21 (ref 12–20)
CALCIUM SERPL-MCNC: 8.9 MG/DL (ref 8.5–10.1)
CHLORIDE SERPL-SCNC: 105 MMOL/L (ref 97–108)
CO2 SERPL-SCNC: 28 MMOL/L (ref 21–32)
COLOR UR: NORMAL
COMMENT, HOLDF: NORMAL
CREAT SERPL-MCNC: 1.03 MG/DL (ref 0.7–1.3)
CRP SERPL-MCNC: <0.29 MG/DL (ref 0–0.6)
DIFFERENTIAL METHOD BLD: ABNORMAL
EOSINOPHIL # BLD: 0 K/UL (ref 0–0.4)
EOSINOPHIL NFR BLD: 0 % (ref 0–7)
EPITH CASTS URNS QL MICRO: NORMAL /LPF
ERYTHROCYTE [DISTWIDTH] IN BLOOD BY AUTOMATED COUNT: 14.8 % (ref 11.5–14.5)
ERYTHROCYTE [SEDIMENTATION RATE] IN BLOOD: 7 MM/HR (ref 0–20)
GLOBULIN SER CALC-MCNC: 3.1 G/DL (ref 2–4)
GLUCOSE BLD STRIP.AUTO-MCNC: 93 MG/DL (ref 65–117)
GLUCOSE SERPL-MCNC: 85 MG/DL (ref 65–100)
GLUCOSE UR STRIP.AUTO-MCNC: NEGATIVE MG/DL
HCT VFR BLD AUTO: 44 % (ref 36.6–50.3)
HGB BLD-MCNC: 14.7 G/DL (ref 12.1–17)
HGB UR QL STRIP: NEGATIVE
HYALINE CASTS URNS QL MICRO: NORMAL /LPF (ref 0–2)
IMM GRANULOCYTES # BLD AUTO: 0 K/UL (ref 0–0.04)
IMM GRANULOCYTES NFR BLD AUTO: 0 % (ref 0–0.5)
KETONES UR QL STRIP.AUTO: NEGATIVE MG/DL
LEUKOCYTE ESTERASE UR QL STRIP.AUTO: NEGATIVE
LYMPHOCYTES # BLD: 0.7 K/UL (ref 0.8–3.5)
LYMPHOCYTES NFR BLD: 7 % (ref 12–49)
MCH RBC QN AUTO: 30.5 PG (ref 26–34)
MCHC RBC AUTO-ENTMCNC: 33.4 G/DL (ref 30–36.5)
MCV RBC AUTO: 91.3 FL (ref 80–99)
MONOCYTES # BLD: 0.7 K/UL (ref 0–1)
MONOCYTES NFR BLD: 7 % (ref 5–13)
NEUTS SEG # BLD: 9.1 K/UL (ref 1.8–8)
NEUTS SEG NFR BLD: 86 % (ref 32–75)
NITRITE UR QL STRIP.AUTO: NEGATIVE
NRBC # BLD: 0 K/UL (ref 0–0.01)
NRBC BLD-RTO: 0 PER 100 WBC
PH UR STRIP: 7 [PH] (ref 5–8)
PLATELET # BLD AUTO: 188 K/UL (ref 150–400)
PMV BLD AUTO: 9.6 FL (ref 8.9–12.9)
POTASSIUM SERPL-SCNC: 4 MMOL/L (ref 3.5–5.1)
PROT SERPL-MCNC: 6.8 G/DL (ref 6.4–8.2)
PROT UR STRIP-MCNC: NEGATIVE MG/DL
RBC # BLD AUTO: 4.82 M/UL (ref 4.1–5.7)
RBC #/AREA URNS HPF: NORMAL /HPF (ref 0–5)
RBC MORPH BLD: ABNORMAL
SAMPLES BEING HELD,HOLD: NORMAL
SERVICE CMNT-IMP: NORMAL
SODIUM SERPL-SCNC: 139 MMOL/L (ref 136–145)
SP GR UR REFRACTOMETRY: 1.01 (ref 1–1.03)
UR CULT HOLD, URHOLD: NORMAL
UROBILINOGEN UR QL STRIP.AUTO: 0.2 EU/DL (ref 0.2–1)
WBC # BLD AUTO: 10.5 K/UL (ref 4.1–11.1)
WBC URNS QL MICRO: NORMAL /HPF (ref 0–4)

## 2022-09-02 PROCEDURE — 81001 URINALYSIS AUTO W/SCOPE: CPT

## 2022-09-02 PROCEDURE — 72158 MRI LUMBAR SPINE W/O & W/DYE: CPT

## 2022-09-02 PROCEDURE — 74011250637 HC RX REV CODE- 250/637: Performed by: PHYSICIAN ASSISTANT

## 2022-09-02 PROCEDURE — A9576 INJ PROHANCE MULTIPACK: HCPCS | Performed by: RADIOLOGY

## 2022-09-02 PROCEDURE — 36415 COLL VENOUS BLD VENIPUNCTURE: CPT

## 2022-09-02 PROCEDURE — 74011000250 HC RX REV CODE- 250: Performed by: EMERGENCY MEDICINE

## 2022-09-02 PROCEDURE — 85025 COMPLETE CBC W/AUTO DIFF WBC: CPT

## 2022-09-02 PROCEDURE — 99285 EMERGENCY DEPT VISIT HI MDM: CPT

## 2022-09-02 PROCEDURE — 74011250637 HC RX REV CODE- 250/637: Performed by: INTERNAL MEDICINE

## 2022-09-02 PROCEDURE — 74011250637 HC RX REV CODE- 250/637: Performed by: EMERGENCY MEDICINE

## 2022-09-02 PROCEDURE — 96372 THER/PROPH/DIAG INJ SC/IM: CPT

## 2022-09-02 PROCEDURE — 74011250636 HC RX REV CODE- 250/636: Performed by: RADIOLOGY

## 2022-09-02 PROCEDURE — 80053 COMPREHEN METABOLIC PANEL: CPT

## 2022-09-02 PROCEDURE — 74011250636 HC RX REV CODE- 250/636: Performed by: PHYSICIAN ASSISTANT

## 2022-09-02 PROCEDURE — 85652 RBC SED RATE AUTOMATED: CPT

## 2022-09-02 PROCEDURE — 74011250636 HC RX REV CODE- 250/636: Performed by: INTERNAL MEDICINE

## 2022-09-02 PROCEDURE — 74011250636 HC RX REV CODE- 250/636: Performed by: EMERGENCY MEDICINE

## 2022-09-02 PROCEDURE — G0378 HOSPITAL OBSERVATION PER HR: HCPCS

## 2022-09-02 PROCEDURE — 96374 THER/PROPH/DIAG INJ IV PUSH: CPT

## 2022-09-02 PROCEDURE — 82962 GLUCOSE BLOOD TEST: CPT

## 2022-09-02 PROCEDURE — 86140 C-REACTIVE PROTEIN: CPT

## 2022-09-02 PROCEDURE — 96375 TX/PRO/DX INJ NEW DRUG ADDON: CPT

## 2022-09-02 PROCEDURE — 96376 TX/PRO/DX INJ SAME DRUG ADON: CPT

## 2022-09-02 RX ORDER — TAMSULOSIN HYDROCHLORIDE 0.4 MG/1
0.4 CAPSULE ORAL
Status: DISCONTINUED | OUTPATIENT
Start: 2022-09-02 | End: 2022-09-04 | Stop reason: HOSPADM

## 2022-09-02 RX ORDER — TRAZODONE HYDROCHLORIDE 50 MG/1
50 TABLET ORAL
Status: DISCONTINUED | OUTPATIENT
Start: 2022-09-02 | End: 2022-09-04 | Stop reason: HOSPADM

## 2022-09-02 RX ORDER — ALPRAZOLAM 0.5 MG/1
0.5 TABLET ORAL DAILY
Status: DISCONTINUED | OUTPATIENT
Start: 2022-09-03 | End: 2022-09-03

## 2022-09-02 RX ORDER — IBUPROFEN 400 MG/1
400 TABLET ORAL
Status: DISCONTINUED | OUTPATIENT
Start: 2022-09-02 | End: 2022-09-04 | Stop reason: HOSPADM

## 2022-09-02 RX ORDER — METHOCARBAMOL 750 MG/1
750 TABLET, FILM COATED ORAL 3 TIMES DAILY
Status: DISCONTINUED | OUTPATIENT
Start: 2022-09-02 | End: 2022-09-03

## 2022-09-02 RX ORDER — ATORVASTATIN CALCIUM 20 MG/1
40 TABLET, FILM COATED ORAL
Status: DISCONTINUED | OUTPATIENT
Start: 2022-09-02 | End: 2022-09-04 | Stop reason: HOSPADM

## 2022-09-02 RX ORDER — OXYCODONE AND ACETAMINOPHEN 5; 325 MG/1; MG/1
1 TABLET ORAL
Status: DISCONTINUED | OUTPATIENT
Start: 2022-09-02 | End: 2022-09-04 | Stop reason: HOSPADM

## 2022-09-02 RX ORDER — LIDOCAINE 4 G/100G
1 PATCH TOPICAL
Status: COMPLETED | OUTPATIENT
Start: 2022-09-02 | End: 2022-09-03

## 2022-09-02 RX ORDER — KETOROLAC TROMETHAMINE 30 MG/ML
30 INJECTION, SOLUTION INTRAMUSCULAR; INTRAVENOUS
Status: DISCONTINUED | OUTPATIENT
Start: 2022-09-02 | End: 2022-09-02 | Stop reason: DRUGHIGH

## 2022-09-02 RX ORDER — ACETAMINOPHEN 325 MG/1
650 TABLET ORAL
Status: DISCONTINUED | OUTPATIENT
Start: 2022-09-02 | End: 2022-09-04 | Stop reason: HOSPADM

## 2022-09-02 RX ORDER — ONDANSETRON 4 MG/1
4 TABLET, ORALLY DISINTEGRATING ORAL
Status: DISCONTINUED | OUTPATIENT
Start: 2022-09-02 | End: 2022-09-02

## 2022-09-02 RX ORDER — POLYETHYLENE GLYCOL 3350 17 G/17G
17 POWDER, FOR SOLUTION ORAL DAILY PRN
Status: DISCONTINUED | OUTPATIENT
Start: 2022-09-02 | End: 2022-09-04 | Stop reason: HOSPADM

## 2022-09-02 RX ORDER — DEXAMETHASONE SODIUM PHOSPHATE 10 MG/ML
10 INJECTION INTRAMUSCULAR; INTRAVENOUS EVERY 8 HOURS
Status: COMPLETED | OUTPATIENT
Start: 2022-09-02 | End: 2022-09-03

## 2022-09-02 RX ORDER — SODIUM CHLORIDE 9 MG/ML
75 INJECTION, SOLUTION INTRAVENOUS CONTINUOUS
Status: DISCONTINUED | OUTPATIENT
Start: 2022-09-02 | End: 2022-09-04

## 2022-09-02 RX ORDER — DIPHENHYDRAMINE HCL 25 MG
50 CAPSULE ORAL
Status: COMPLETED | OUTPATIENT
Start: 2022-09-02 | End: 2022-09-02

## 2022-09-02 RX ORDER — HYDROMORPHONE HYDROCHLORIDE 1 MG/ML
0.5 INJECTION, SOLUTION INTRAMUSCULAR; INTRAVENOUS; SUBCUTANEOUS
Status: DISCONTINUED | OUTPATIENT
Start: 2022-09-02 | End: 2022-09-04 | Stop reason: HOSPADM

## 2022-09-02 RX ORDER — PANTOPRAZOLE SODIUM 40 MG/1
40 TABLET, DELAYED RELEASE ORAL
Status: DISCONTINUED | OUTPATIENT
Start: 2022-09-03 | End: 2022-09-04 | Stop reason: HOSPADM

## 2022-09-02 RX ORDER — ACETAMINOPHEN 650 MG/1
650 SUPPOSITORY RECTAL
Status: DISCONTINUED | OUTPATIENT
Start: 2022-09-02 | End: 2022-09-02

## 2022-09-02 RX ORDER — ENOXAPARIN SODIUM 100 MG/ML
40 INJECTION SUBCUTANEOUS DAILY
Status: DISCONTINUED | OUTPATIENT
Start: 2022-09-03 | End: 2022-09-04 | Stop reason: HOSPADM

## 2022-09-02 RX ORDER — DEXAMETHASONE SODIUM PHOSPHATE 10 MG/ML
10 INJECTION INTRAMUSCULAR; INTRAVENOUS ONCE
Status: COMPLETED | OUTPATIENT
Start: 2022-09-02 | End: 2022-09-02

## 2022-09-02 RX ORDER — ONDANSETRON 2 MG/ML
4 INJECTION INTRAMUSCULAR; INTRAVENOUS
Status: DISCONTINUED | OUTPATIENT
Start: 2022-09-02 | End: 2022-09-04 | Stop reason: HOSPADM

## 2022-09-02 RX ORDER — KETOROLAC TROMETHAMINE 30 MG/ML
15 INJECTION, SOLUTION INTRAMUSCULAR; INTRAVENOUS
Status: COMPLETED | OUTPATIENT
Start: 2022-09-02 | End: 2022-09-02

## 2022-09-02 RX ORDER — HYDROMORPHONE HYDROCHLORIDE 1 MG/ML
0.5 INJECTION, SOLUTION INTRAMUSCULAR; INTRAVENOUS; SUBCUTANEOUS
Status: COMPLETED | OUTPATIENT
Start: 2022-09-02 | End: 2022-09-02

## 2022-09-02 RX ORDER — GABAPENTIN 100 MG/1
100 CAPSULE ORAL 2 TIMES DAILY
Status: DISCONTINUED | OUTPATIENT
Start: 2022-09-02 | End: 2022-09-04 | Stop reason: HOSPADM

## 2022-09-02 RX ADMIN — TRAZODONE HYDROCHLORIDE 50 MG: 50 TABLET ORAL at 21:00

## 2022-09-02 RX ADMIN — GABAPENTIN 100 MG: 100 CAPSULE ORAL at 17:42

## 2022-09-02 RX ADMIN — HYDROMORPHONE HYDROCHLORIDE 0.5 MG: 1 INJECTION, SOLUTION INTRAMUSCULAR; INTRAVENOUS; SUBCUTANEOUS at 12:00

## 2022-09-02 RX ADMIN — GADOTERIDOL 13 ML: 279.3 INJECTION, SOLUTION INTRAVENOUS at 12:45

## 2022-09-02 RX ADMIN — TAMSULOSIN HYDROCHLORIDE 0.4 MG: 0.4 CAPSULE ORAL at 20:59

## 2022-09-02 RX ADMIN — DIPHENHYDRAMINE HYDROCHLORIDE 50 MG: 25 CAPSULE ORAL at 12:11

## 2022-09-02 RX ADMIN — ATORVASTATIN CALCIUM 40 MG: 20 TABLET, FILM COATED ORAL at 21:00

## 2022-09-02 RX ADMIN — METHOCARBAMOL TABLETS 750 MG: 750 TABLET, COATED ORAL at 21:00

## 2022-09-02 RX ADMIN — DEXAMETHASONE SODIUM PHOSPHATE 10 MG: 10 INJECTION, SOLUTION INTRAMUSCULAR; INTRAVENOUS at 16:39

## 2022-09-02 RX ADMIN — METHOCARBAMOL TABLETS 750 MG: 750 TABLET, COATED ORAL at 16:39

## 2022-09-02 RX ADMIN — KETOROLAC TROMETHAMINE 15 MG: 30 INJECTION, SOLUTION INTRAMUSCULAR at 11:00

## 2022-09-02 RX ADMIN — DEXAMETHASONE SODIUM PHOSPHATE 10 MG: 10 INJECTION, SOLUTION INTRAMUSCULAR; INTRAVENOUS at 21:00

## 2022-09-02 RX ADMIN — OXYCODONE AND ACETAMINOPHEN 1 TABLET: 5; 325 TABLET ORAL at 23:16

## 2022-09-02 RX ADMIN — SODIUM CHLORIDE 75 ML/HR: 9 INJECTION, SOLUTION INTRAVENOUS at 17:42

## 2022-09-02 RX ADMIN — OXYCODONE AND ACETAMINOPHEN 1 TABLET: 5; 325 TABLET ORAL at 17:42

## 2022-09-02 NOTE — H&P
[facility]  [address], [facility]  [phone]    Hospitalist Admission Note      NAME:  Deja Berger   :   1940   MRN:  [id number]     PCP:  Araceli English MD     Date/Time of service:  2022 4:02 PM          Subjective:     CHIEF COMPLAINT: back pain     HISTORY OF PRESENT ILLNESS:     Mr. Geovanna Paz is a 81 y.o.  male who presented to the Emergency Department complaining of back pain. Much worse than usual over last 2 weeks with L side sciatica. Cannot do ADLs. MRI in ER without acute surgical issues. We will admit him for observation. Past Medical History:   Diagnosis Date    Anxiety     Arthritis     BPH (benign prostatic hyperplasia)     Chronic pain     Coronary artery disease     GERD (gastroesophageal reflux disease)     HTN (hypertension), benign     Rotator cuff tear     RIGHT         Past Surgical History:   Procedure Laterality Date    HX ADENOIDECTOMY      HX APPENDECTOMY      HX COLONOSCOPY      HX CORONARY ARTERY BYPASS GRAFT      HX ORTHOPAEDIC Right 2021    Middle finger trigger release    HX POLYPECTOMY      HX TONSILLECTOMY      HX VASECTOMY         Social History     Tobacco Use    Smoking status: Former     Packs/day: 1.00     Years: 20.00     Pack years: 20.00     Types: Cigarettes     Quit date:      Years since quittin.6    Smokeless tobacco: Never   Substance Use Topics    Alcohol use: Yes     Alcohol/week: 7.0 standard drinks     Types: 7 Glasses of wine per week        Family History   Problem Relation Age of Onset    Cancer Mother         COLON    Cancer Father         BRAIN    Heart Disease Brother     Stroke Neg Hx     Anesth Problems Neg Hx       Allergies   Allergen Reactions    Iodinated Contrast Media Shortness of Breath     Lung aspiration  Short of breath        Prior to Admission medications    Medication Sig Start Date End Date Taking?  Authorizing Provider traZODone (DESYREL) 100 mg tablet Take 50 mg by mouth nightly. Provider, Historical   vit A/vit C/vit E/zinc/copper (PRESERVISION AREDS PO) Take 1 Tablet by mouth daily. Provider, Historical   omega-3/dha/epa/fish oil (OMEGA-3 FISH OIL PO) Take 500 mg by mouth daily. Provider, Historical   ascorbic acid, vitamin C, (Vitamin C) 1,000 mg tablet Take 1,000 mg by mouth daily. Provider, Historical   ALPRAZolam (XANAX) 0.5 mg tablet Take 0.5 mg by mouth daily. 4/7/20   Provider, Historical   testosterone cypionate (DEPOTESTOTERONE CYPIONATE) 200 mg/mL injection 200 mg by IntraMUSCular route every fourteen (14) days. Provider, Historical   amLODIPine (NORVASC) 2.5 mg tablet Take 2.5 mg by mouth two (2) times a day. Provider, Historical   tamsulosin (FLOMAX) 0.4 mg capsule Take 0.4 mg by mouth nightly. Provider, Historical   naproxen sodium (ALEVE PO) Take 1 Tablet by mouth as needed. Provider, Historical   omeprazole (PRILOSEC) 40 mg capsule Take 40 mg by mouth daily (after breakfast). Provider, Historical   aspirin 81 mg chewable tablet Take 81 mg by mouth daily. Provider, Historical   atorvastatin (LIPITOR) 40 mg tablet Take 40 mg by mouth nightly.     Provider, Historical       Review of Systems:  (bold if positive, if negative)    Gen:  Eyes:  ENT:  CVS:  Pulm:  GI:  :  MS:  Pain, weakness, swelling, arthritis  Skin:  Psych:  Endo:  Hem:  Renal:  Neuro:        Objective:      VITALS:    Vital signs reviewed; most recent are:    Visit Vitals  BP (!) 152/78   Pulse 60   Temp 98.3 °F (36.8 °C)   Resp 16   Ht 5' 7\" (1.702 m)   Wt 65.8 kg (145 lb)   SpO2 96%   BMI 22.71 kg/m²     SpO2 Readings from Last 6 Encounters:   09/02/22 96%   11/19/21 98%   11/16/21 95%   09/17/21 96%   02/08/21 98%   08/05/20 98%        No intake or output data in the 24 hours ending 09/02/22 1602     Exam:     Physical Exam:    Gen:  Frail, in acute distress  HEENT:  Pink conjunctivae, PERRL, hearing intact to voice, moist mucous membranes  Neck:  Supple, without masses, thyroid non-tender  Resp:  No accessory muscle use, clear breath sounds without wheezes rales or rhonchi  Card:  No murmurs, normal S1, S2 without thrills, bruits or peripheral edema  Abd:  Soft, non-tender, non-distended, normoactive bowel sounds are present, no mass  Lymph:  No cervical or inguinal adenopathy  Musc:  No cyanosis or clubbing  Skin:  No rashes or ulcers, skin turgor is good  Neuro:  Cranial nerves are grossly intact, pain and general motor weakness, follows commands appropriately  Psych:  Good insight, oriented to person, place and time, alert     Labs:    Recent Labs     09/02/22  1158   WBC 10.5   HGB 14.7   HCT 44.0        Recent Labs     09/02/22  1158      K 4.0      CO2 28   GLU 85   BUN 22*   CREA 1.03   CA 8.9   ALB 3.7   TBILI 0.5   ALT 26     Lab Results   Component Value Date/Time    Glucose (POC) 93 09/02/2022 10:59 AM    Glucose (POC) 167 (H) 04/19/2019 08:54 AM    Glucose (POC) 125 (H) 02/15/2014 07:36 PM     No results for input(s): PH, PCO2, PO2, HCO3, FIO2 in the last 72 hours. No results for input(s): INR, INREXT in the last 72 hours. All Micro Results       Procedure Component Value Units Date/Time    URINE CULTURE HOLD SAMPLE [[id number]] Collected: 09/02/22 1042    Order Status: Completed Specimen: Urine from Serum Updated: 09/02/22 1053     Urine culture hold       Urine on hold in Microbiology dept for 2 days. If unpreserved urine is submitted, it cannot be used for addtional testing after 24 hours, recollection will be required. I have reviewed previous records       Assessment and Plan:      Acute lumbar back pain / Generalized weakness / Arthritis / Chronic pain - POA, ortho consulted by ER. No acute surgical needs. Admit for steroids, pain control and PT OT eval.   Fall precautions. Continue tylenol, motrin, lidoderm  and prn percocet.      Coronary artery disease / HTN (hypertension), benign - POA, appears stable. Not on BB. Hold Norvasc. Continue statin. Hold ASA in case procedure is needed. Hyperlipidemia - continue atorvastatin    Urine retention / BPH (benign prostatic hyperplasia) - Continue Flomax and check PVR. Insomnia / Anxiety - Continue trazodone and prn xanax       Telemetry reviewed:   normal sinus rhythm    Risk of deterioration: high      Total time spent with patient: 48 Minutes I personally reviewed chart, notes, data and current medications in the medical record. I have personally examined and treated the patient at bedside during this period. To assist coordination of care and communication with nursing and staff, this note may be preliminary early in the day, but finalized by end of the day.                  Care Plan discussed with: Patient, Nursing Staff, and >50% of time spent in counseling and coordination of care    Discussed:  Care Plan and D/C Planning       ___________________________________________________    Attending Physician: Dano Aguilar MD

## 2022-09-02 NOTE — ED PROVIDER NOTES
Oliva Knowles is an 82 yo M with chronic low back pain who has been having increasing pain with radiation down his posterior leg for the past 2 weeks. He had been followed by Dr. Jersey Rene but has transferred care to Dr. Tono Kirkpatrick as Dr. Jersey Rene has retired. He saw Dr. Tono Kirkpatrick on the  and was scheduled for injections on  but he has been having increasing pain and sciatic symptoms. He denies saddle numbness or difficulty controlling bladder or bowels but does not that he is urinating more frequently and he is having a lot of trouble walking at home and feels like he is going to fall. He was prescribed Zanaflex by Dr. Tono Kirkpatrick but it has not helped.            Past Medical History:   Diagnosis Date    Anxiety     Arthritis     BPH (benign prostatic hyperplasia)     Chronic pain     Coronary artery disease     GERD (gastroesophageal reflux disease)     HTN (hypertension), benign     Rotator cuff tear     RIGHT     TIA (transient ischemic attack)        Past Surgical History:   Procedure Laterality Date    HX ADENOIDECTOMY      HX APPENDECTOMY      HX COLONOSCOPY      HX CORONARY ARTERY BYPASS GRAFT      HX ORTHOPAEDIC Right 2021    Middle finger trigger release    HX POLYPECTOMY      HX TONSILLECTOMY      HX VASECTOMY           Family History:   Problem Relation Age of Onset    Cancer Mother         COLON    Cancer Father         BRAIN    Heart Disease Brother     Stroke Neg Hx     Anesth Problems Neg Hx        Social History     Socioeconomic History    Marital status:      Spouse name: Not on file    Number of children: Not on file    Years of education: Not on file    Highest education level: Not on file   Occupational History    Not on file   Tobacco Use    Smoking status: Former     Packs/day: 1.00     Years: 20.00     Pack years: 20.00     Types: Cigarettes     Quit date:      Years since quittin.6    Smokeless tobacco: Never   Vaping Use Vaping Use: Never used   Substance and Sexual Activity    Alcohol use: Yes     Alcohol/week: 7.0 standard drinks     Types: 7 Glasses of wine per week    Drug use: No    Sexual activity: Not on file   Other Topics Concern    Not on file   Social History Narrative    Not on file     Social Determinants of Health     Financial Resource Strain: Not on file   Food Insecurity: Not on file   Transportation Needs: Not on file   Physical Activity: Not on file   Stress: Not on file   Social Connections: Not on file   Intimate Partner Violence: Not on file   Housing Stability: Not on file         ALLERGIES: Iodinated contrast media    Review of Systems   Constitutional:  Negative for fever. HENT:  Negative for sore throat. Eyes:  Negative for visual disturbance. Respiratory:  Negative for cough. Cardiovascular:  Negative for chest pain. Gastrointestinal:  Negative for abdominal pain. Genitourinary:  Positive for frequency. Negative for difficulty urinating and dysuria. Musculoskeletal:  Positive for back pain. Skin:  Negative for rash. Neurological:  Negative for headaches. Vitals:    09/02/22 1027   BP: (!) 155/72   Pulse: 60   Resp: 16   Temp: 98.3 °F (36.8 °C)   SpO2: 97%            Physical Exam  Vitals and nursing note reviewed. Constitutional:       General: He is not in acute distress. Appearance: He is well-developed. HENT:      Head: Normocephalic and atraumatic. Mouth/Throat:      Mouth: Mucous membranes are moist.   Eyes:      Extraocular Movements: Extraocular movements intact. Conjunctiva/sclera: Conjunctivae normal.   Neck:      Trachea: Phonation normal.   Cardiovascular:      Rate and Rhythm: Normal rate. Pulmonary:      Effort: Pulmonary effort is normal. No respiratory distress. Abdominal:      General: There is no distension. Musculoskeletal:         General: No tenderness. Normal range of motion. Cervical back: Normal range of motion.       Lumbar back: Spasms present. Skin:     General: Skin is warm and dry. Neurological:      General: No focal deficit present. Mental Status: He is alert. He is not disoriented. Motor: No abnormal muscle tone. MDM    11:36 AM  Post void Bladder scan with 204ml which is abnormal.  Will order MRI L spine. Labs including ESR and CRP ordered. Updated Dr. Pederson Number via perfect serve [address]. presentation, results and plans to date. [employer] Consult for Admission  3:49 PM    ED Room Number: ER05/05  Patient Name and age:  Elenita Hirsch 81 y.o.  male  Working Diagnosis:   1. Acute left-sided low back pain with left-sided sciatica    2. Unable to walk    3. Urinary retention        COVID-19 Suspicion:  no  Sepsis present:  no  Reassessment needed: no  Code Status:  Full Code  Readmission: no  Isolation Requirements:  no  Recommended Level of Care:  med/surg  Department:[facility] - [phone]  Other:  h/o chronic low back pain, followed by Dr. Samir Samaniego, has had increasing pain with left sciatic and leg weakness for the past 2 weeks. Is scheduled to have injection in 9/8 but could not tolerate increasing pain and mild urinary retention, (301ml on post void bladder scan). I obtained MRI which has findings that correlate with left sciatica but no significant cord compression. Patient unable to be pain controlled after Toradol and hydromorphone. Discussed with ortho who advised IV dexamethasone admit for pain control.      Procedures

## 2022-09-02 NOTE — CONSULTS
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  male who is being seen for acute left sided back pain with radiation down to the left knee and occasionally to the left foot. Previous office visit with Dr. Padmini Carrington and has failed treatment with rest, Aleve, tylenol, Zanaflex, prednisone and pred-clinton from PCP, IV Dilaudid in ED and Toradol. He denies any new injuries to his back or leg. He denies any loss of bowel or bladder. He does report loss of sleep and frustration in not being able to lessen the pain. Workup has revealed DDD, Lumbar stenosis and urinary retention. He is present today with his wife. Patient Active Problem List    Diagnosis Date Noted    Arthritis 2022    Chronic pain 2022    Acute lumbar back pain 2022    Back pain 2022    BPH (benign prostatic hyperplasia) 2020    Anxiety 2020    Generalized weakness 02/15/2014    HTN (hypertension), benign 02/15/2014    Hyperlipidemia 02/15/2014    Coronary artery disease 1988     Family History   Problem Relation Age of Onset    Cancer Mother         COLON    Cancer Father         BRAIN    Heart Disease Brother     Stroke Neg Hx     Anesth Problems Neg Hx       Social History     Tobacco Use    Smoking status: Former     Packs/day: 1.00     Years: 20.00     Pack years: 20.00     Types: Cigarettes     Quit date:      Years since quittin.6    Smokeless tobacco: Never   Substance Use Topics    Alcohol use:  Yes     Alcohol/week: 7.0 standard drinks     Types: 7 Glasses of wine per week     Past Medical History:   Diagnosis Date    Anxiety     Arthritis     BPH (benign prostatic hyperplasia)     Chronic pain     Coronary artery disease     GERD (gastroesophageal reflux disease)     HTN (hypertension), benign     Rotator cuff tear     RIGHT       Past Surgical History:   Procedure Laterality Date    HX Bachloh 61    HX COLONOSCOPY      HX CORONARY ARTERY BYPASS GRAFT  1986    HX ORTHOPAEDIC Right 09/17/2021    Middle finger trigger release    HX POLYPECTOMY      HX TONSILLECTOMY  1955    Toyatsjeronimo Joshua 70      Prior to Admission medications    Medication Sig Start Date End Date Taking? Authorizing Provider   traZODone (DESYREL) 100 mg tablet Take 50 mg by mouth nightly. Provider, Historical   vit A/vit C/vit E/zinc/copper (PRESERVISION AREDS PO) Take 1 Tablet by mouth daily. Provider, Historical   omega-3/dha/epa/fish oil (OMEGA-3 FISH OIL PO) Take 500 mg by mouth daily. Provider, Historical   ascorbic acid, vitamin C, (Vitamin C) 1,000 mg tablet Take 1,000 mg by mouth daily. Provider, Historical   ALPRAZolam (XANAX) 0.5 mg tablet Take 0.5 mg by mouth daily. 4/7/20   Provider, Historical   testosterone cypionate (DEPOTESTOTERONE CYPIONATE) 200 mg/mL injection 200 mg by IntraMUSCular route every fourteen (14) days. Provider, Historical   amLODIPine (NORVASC) 2.5 mg tablet Take 2.5 mg by mouth two (2) times a day. Provider, Historical   tamsulosin (FLOMAX) 0.4 mg capsule Take 0.4 mg by mouth nightly. Provider, Historical   naproxen sodium (ALEVE PO) Take 1 Tablet by mouth as needed. Provider, Historical   omeprazole (PRILOSEC) 40 mg capsule Take 40 mg by mouth daily (after breakfast). Provider, Historical   aspirin 81 mg chewable tablet Take 81 mg by mouth daily. Provider, Historical   atorvastatin (LIPITOR) 40 mg tablet Take 40 mg by mouth nightly.     Provider, Historical     Current Facility-Administered Medications   Medication Dose Route Frequency    lidocaine 4 % patch 1 Patch  1 Patch TransDERmal NOW    dexamethasone (PF) (DECADRON) 10 mg/mL injection 10 mg  10 mg IntraVENous ONCE    [START ON 9/3/2022] ALPRAZolam (XANAX) tablet 0.5 mg  0.5 mg Oral DAILY    atorvastatin (LIPITOR) tablet 40 mg  40 mg Oral QHS    [START ON 9/3/2022] pantoprazole (PROTONIX) tablet 40 mg  40 mg Oral ACB    tamsulosin (FLOMAX) capsule 0.4 mg  0.4 mg Oral QHS    traZODone (DESYREL) tablet 50 mg  50 mg Oral QHS    acetaminophen (TYLENOL) tablet 650 mg  650 mg Oral Q6H PRN    polyethylene glycol (MIRALAX) packet 17 g  17 g Oral DAILY PRN    ondansetron (ZOFRAN) injection 4 mg  4 mg IntraVENous Q6H PRN    [START ON 9/3/2022] enoxaparin (LOVENOX) injection 40 mg  40 mg SubCUTAneous DAILY    0.9% sodium chloride infusion  75 mL/hr IntraVENous CONTINUOUS    oxyCODONE-acetaminophen (PERCOCET) 5-325 mg per tablet 1 Tablet  1 Tablet Oral Q6H PRN    methocarbamoL (ROBAXIN) tablet 750 mg  750 mg Oral TID    gabapentin (NEURONTIN) capsule 100 mg  100 mg Oral BID    ibuprofen (MOTRIN) tablet 400 mg  400 mg Oral Q4H PRN    dexamethasone (PF) (DECADRON) 10 mg/mL injection 10 mg  10 mg IntraVENous Q8H    HYDROmorphone (DILAUDID) syringe 0.5 mg  0.5 mg IntraVENous Q3H PRN     Current Outpatient Medications   Medication Sig    traZODone (DESYREL) 100 mg tablet Take 50 mg by mouth nightly. vit A/vit C/vit E/zinc/copper (PRESERVISION AREDS PO) Take 1 Tablet by mouth daily. omega-3/dha/epa/fish oil (OMEGA-3 FISH OIL PO) Take 500 mg by mouth daily. ascorbic acid, vitamin C, (Vitamin C) 1,000 mg tablet Take 1,000 mg by mouth daily. ALPRAZolam (XANAX) 0.5 mg tablet Take 0.5 mg by mouth daily. testosterone cypionate (DEPOTESTOTERONE CYPIONATE) 200 mg/mL injection 200 mg by IntraMUSCular route every fourteen (14) days. amLODIPine (NORVASC) 2.5 mg tablet Take 2.5 mg by mouth two (2) times a day. tamsulosin (FLOMAX) 0.4 mg capsule Take 0.4 mg by mouth nightly. naproxen sodium (ALEVE PO) Take 1 Tablet by mouth as needed. omeprazole (PRILOSEC) 40 mg capsule Take 40 mg by mouth daily (after breakfast). aspirin 81 mg chewable tablet Take 81 mg by mouth daily. atorvastatin (LIPITOR) 40 mg tablet Take 40 mg by mouth nightly.      Allergies   Allergen Reactions    Iodinated Contrast Media Shortness of Breath     Lung aspiration  Short of breath        Review of Systems:  Pertinent items are noted in HPI. Objective:     Patient Vitals for the past 8 hrs:   BP Temp Pulse Resp SpO2 Height Weight   22 1506 (!) 152/78 -- -- -- 96 % -- --   22 1220 -- -- -- -- -- 5' 7\" (1.702 m) 65.8 kg (145 lb)   22 1027 (!) 155/72 98.3 °F (36.8 °C) 60 16 97 % -- --     Temp (24hrs), Av.3 °F (36.8 °C), Min:98.3 °F (36.8 °C), Max:98.3 °F (36.8 °C)      Physical Exam:  General: alert, cooperative, no distress, appears stated age  Cardiac: Good peripheral pulses  Lungs: no increased WOB, no supplemental O2  Musculoskeletal: negative  Left hip exam: Painful with AROM/PROM, localizes pain to the posterior gluteus and piriformis, diminished muscle/mild atrophy. No anterior hip or groin pain, Mild limited knee ROM secondary to pain and spasm. No joint swelling or crepitus noted. Good ankle PF/DF. + DP/PT pulses. SILT. Neurological: No drop foot, no paraesthesias, good perianal sensation, slight hyporeflexia bilateral patella. Data Review   Recent Results (from the past 24 hour(s))   URINALYSIS W/MICROSCOPIC    Collection Time: 22 10:42 AM   Result Value Ref Range    Color YELLOW/STRAW      Appearance CLEAR CLEAR      Specific gravity 1.006 1.003 - 1.030      pH (UA) 7.0 5.0 - 8.0      Protein Negative NEG mg/dL    Glucose Negative NEG mg/dL    Ketone Negative NEG mg/dL    Bilirubin Negative NEG      Blood Negative NEG      Urobilinogen 0.2 0.2 - 1.0 EU/dL    Nitrites Negative NEG      Leukocyte Esterase Negative NEG      WBC 0-4 0 - 4 /hpf    RBC 0-5 0 - 5 /hpf    Epithelial cells FEW FEW /lpf    Bacteria Negative NEG /hpf    Hyaline cast 0-2 0 - 2 /lpf   URINE CULTURE HOLD SAMPLE    Collection Time: 22 10:42 AM    Specimen: Serum; Urine   Result Value Ref Range    Urine culture hold        Urine on hold in Microbiology dept for 2 days.   If unpreserved urine is submitted, it cannot be used for addtional testing after 24 hours, recollection will be required. GLUCOSE, POC    Collection Time: 09/02/22 10:59 AM   Result Value Ref Range    Glucose (POC) 93 65 - 117 mg/dL    Performed by John Morales    CBC WITH AUTOMATED DIFF    Collection Time: 09/02/22 11:58 AM   Result Value Ref Range    WBC 10.5 4.1 - 11.1 K/uL    RBC 4.82 4.10 - 5.70 M/uL    HGB 14.7 12.1 - 17.0 g/dL    HCT 44.0 36.6 - 50.3 %    MCV 91.3 80.0 - 99.0 FL    MCH 30.5 26.0 - 34.0 PG    MCHC 33.4 30.0 - 36.5 g/dL    RDW 14.8 (H) 11.5 - 14.5 %    PLATELET 299 823 - 627 K/uL    MPV 9.6 8.9 - 12.9 FL    NRBC 0.0 0  WBC    ABSOLUTE NRBC 0.00 0.00 - 0.01 K/uL    NEUTROPHILS 86 (H) 32 - 75 %    LYMPHOCYTES 7 (L) 12 - 49 %    MONOCYTES 7 5 - 13 %    EOSINOPHILS 0 0 - 7 %    BASOPHILS 0 0 - 1 %    IMMATURE GRANULOCYTES 0 0.0 - 0.5 %    ABS. NEUTROPHILS 9.1 (H) 1.8 - 8.0 K/UL    ABS. LYMPHOCYTES 0.7 (L) 0.8 - 3.5 K/UL    ABS. MONOCYTES 0.7 0.0 - 1.0 K/UL    ABS. EOSINOPHILS 0.0 0.0 - 0.4 K/UL    ABS. BASOPHILS 0.0 0.0 - 0.1 K/UL    ABS. IMM. GRANS. 0.0 0.00 - 0.04 K/UL    DF SMEAR SCANNED      RBC COMMENTS NORMOCYTIC, NORMOCHROMIC     METABOLIC PANEL, COMPREHENSIVE    Collection Time: 09/02/22 11:58 AM   Result Value Ref Range    Sodium 139 136 - 145 mmol/L    Potassium 4.0 3.5 - 5.1 mmol/L    Chloride 105 97 - 108 mmol/L    CO2 28 21 - 32 mmol/L    Anion gap 6 5 - 15 mmol/L    Glucose 85 65 - 100 mg/dL    BUN 22 (H) 6 - 20 MG/DL    Creatinine 1.03 0.70 - 1.30 MG/DL    BUN/Creatinine ratio 21 (H) 12 - 20      GFR est AA >60 >60 ml/min/1.73m2    GFR est non-AA >60 >60 ml/min/1.73m2    Calcium 8.9 8.5 - 10.1 MG/DL    Bilirubin, total 0.5 0.2 - 1.0 MG/DL    ALT (SGPT) 26 12 - 78 U/L    AST (SGOT) 15 15 - 37 U/L    Alk.  phosphatase 72 45 - 117 U/L    Protein, total 6.8 6.4 - 8.2 g/dL    Albumin 3.7 3.5 - 5.0 g/dL    Globulin 3.1 2.0 - 4.0 g/dL    A-G Ratio 1.2 1.1 - 2.2     SAMPLES BEING HELD    Collection Time: 09/02/22 11:58 AM   Result Value Ref Range    SAMPLES BEING HELD SST. RED     COMMENT        Add-on orders for these samples will be processed based on acceptable specimen integrity and analyte stability, which may vary by analyte. SED RATE (ESR)    Collection Time: 09/02/22 11:58 AM   Result Value Ref Range    Sed rate, automated 7 0 - 20 mm/hr   C REACTIVE PROTEIN, QT    Collection Time: 09/02/22 11:58 AM   Result Value Ref Range    C-Reactive protein <0.29 0.00 - 0.60 mg/dL     MRI L-spine today:  IMPRESSION  1. Degenerative change most significant at L4-L5 with left foraminal extrusion  extending cranially. Moderate left foraminal narrowing. Assessment/Plan:   Left sided sciatica with DDD  MRI Imaging and previous treatment plan review with patient and wife. Non-operative treatment. Failure of conservative treatment and will be admitted to medicine for pain control. Recommend IV decadron and start gabapentin/ Robaxin. Heating pad ordered.   Post-void bladder scans    Discussed findings and plan of care with Dr. Vickey Del Valle, DYANA Bains

## 2022-09-02 NOTE — ED NOTES
Following dilaudid administration, patient states \" my throat feels weird, I feel like Im choking:. No respiratory distress noted, O2 maintained 97%.  Dr. Fidel Germain made aware

## 2022-09-03 LAB
ALBUMIN SERPL-MCNC: 3.5 G/DL (ref 3.5–5)
ALBUMIN/GLOB SERPL: 1.1 {RATIO} (ref 1.1–2.2)
ALP SERPL-CCNC: 71 U/L (ref 45–117)
ALT SERPL-CCNC: 26 U/L (ref 12–78)
ANION GAP SERPL CALC-SCNC: 5 MMOL/L (ref 5–15)
AST SERPL-CCNC: 13 U/L (ref 15–37)
BILIRUB SERPL-MCNC: 0.5 MG/DL (ref 0.2–1)
BUN SERPL-MCNC: 22 MG/DL (ref 6–20)
BUN/CREAT SERPL: 24 (ref 12–20)
CALCIUM SERPL-MCNC: 8.8 MG/DL (ref 8.5–10.1)
CHLORIDE SERPL-SCNC: 107 MMOL/L (ref 97–108)
CO2 SERPL-SCNC: 25 MMOL/L (ref 21–32)
CREAT SERPL-MCNC: 0.92 MG/DL (ref 0.7–1.3)
ERYTHROCYTE [DISTWIDTH] IN BLOOD BY AUTOMATED COUNT: 14.6 % (ref 11.5–14.5)
GLOBULIN SER CALC-MCNC: 3.2 G/DL (ref 2–4)
GLUCOSE SERPL-MCNC: 147 MG/DL (ref 65–100)
HCT VFR BLD AUTO: 43 % (ref 36.6–50.3)
HGB BLD-MCNC: 14.6 G/DL (ref 12.1–17)
MAGNESIUM SERPL-MCNC: 2.2 MG/DL (ref 1.6–2.4)
MCH RBC QN AUTO: 29.9 PG (ref 26–34)
MCHC RBC AUTO-ENTMCNC: 34 G/DL (ref 30–36.5)
MCV RBC AUTO: 88.1 FL (ref 80–99)
NRBC # BLD: 0 K/UL (ref 0–0.01)
NRBC BLD-RTO: 0 PER 100 WBC
PLATELET # BLD AUTO: 195 K/UL (ref 150–400)
PMV BLD AUTO: 9.6 FL (ref 8.9–12.9)
POTASSIUM SERPL-SCNC: 4.1 MMOL/L (ref 3.5–5.1)
PROT SERPL-MCNC: 6.7 G/DL (ref 6.4–8.2)
RBC # BLD AUTO: 4.88 M/UL (ref 4.1–5.7)
SODIUM SERPL-SCNC: 137 MMOL/L (ref 136–145)
WBC # BLD AUTO: 6.4 K/UL (ref 4.1–11.1)

## 2022-09-03 PROCEDURE — 74011250636 HC RX REV CODE- 250/636: Performed by: PHYSICIAN ASSISTANT

## 2022-09-03 PROCEDURE — 97535 SELF CARE MNGMENT TRAINING: CPT

## 2022-09-03 PROCEDURE — 74011250637 HC RX REV CODE- 250/637: Performed by: INTERNAL MEDICINE

## 2022-09-03 PROCEDURE — 97116 GAIT TRAINING THERAPY: CPT

## 2022-09-03 PROCEDURE — 96372 THER/PROPH/DIAG INJ SC/IM: CPT

## 2022-09-03 PROCEDURE — 97162 PT EVAL MOD COMPLEX 30 MIN: CPT

## 2022-09-03 PROCEDURE — 85027 COMPLETE CBC AUTOMATED: CPT

## 2022-09-03 PROCEDURE — 96374 THER/PROPH/DIAG INJ IV PUSH: CPT

## 2022-09-03 PROCEDURE — 74011250637 HC RX REV CODE- 250/637: Performed by: PHYSICIAN ASSISTANT

## 2022-09-03 PROCEDURE — 96376 TX/PRO/DX INJ SAME DRUG ADON: CPT

## 2022-09-03 PROCEDURE — 74011250636 HC RX REV CODE- 250/636: Performed by: INTERNAL MEDICINE

## 2022-09-03 PROCEDURE — 83735 ASSAY OF MAGNESIUM: CPT

## 2022-09-03 PROCEDURE — G0378 HOSPITAL OBSERVATION PER HR: HCPCS

## 2022-09-03 PROCEDURE — 36415 COLL VENOUS BLD VENIPUNCTURE: CPT

## 2022-09-03 PROCEDURE — 97165 OT EVAL LOW COMPLEX 30 MIN: CPT

## 2022-09-03 PROCEDURE — 97110 THERAPEUTIC EXERCISES: CPT

## 2022-09-03 PROCEDURE — 80053 COMPREHEN METABOLIC PANEL: CPT

## 2022-09-03 RX ORDER — DEXAMETHASONE SODIUM PHOSPHATE 4 MG/ML
4 INJECTION, SOLUTION INTRA-ARTICULAR; INTRALESIONAL; INTRAMUSCULAR; INTRAVENOUS; SOFT TISSUE EVERY 12 HOURS
Status: DISCONTINUED | OUTPATIENT
Start: 2022-09-03 | End: 2022-09-04 | Stop reason: HOSPADM

## 2022-09-03 RX ORDER — METOPROLOL TARTRATE 25 MG/1
12.5 TABLET, FILM COATED ORAL EVERY 12 HOURS
Status: DISCONTINUED | OUTPATIENT
Start: 2022-09-03 | End: 2022-09-04

## 2022-09-03 RX ORDER — ALPRAZOLAM 0.25 MG/1
0.25 TABLET ORAL
Status: DISCONTINUED | OUTPATIENT
Start: 2022-09-03 | End: 2022-09-04 | Stop reason: HOSPADM

## 2022-09-03 RX ORDER — DIAZEPAM 5 MG/1
5 TABLET ORAL
Status: DISCONTINUED | OUTPATIENT
Start: 2022-09-03 | End: 2022-09-04 | Stop reason: HOSPADM

## 2022-09-03 RX ORDER — AMLODIPINE BESYLATE 2.5 MG/1
2.5 TABLET ORAL 2 TIMES DAILY
Status: DISCONTINUED | OUTPATIENT
Start: 2022-09-04 | End: 2022-09-04

## 2022-09-03 RX ADMIN — ATORVASTATIN CALCIUM 40 MG: 20 TABLET, FILM COATED ORAL at 21:06

## 2022-09-03 RX ADMIN — DEXAMETHASONE SODIUM PHOSPHATE 10 MG: 10 INJECTION, SOLUTION INTRAMUSCULAR; INTRAVENOUS at 13:21

## 2022-09-03 RX ADMIN — TAMSULOSIN HYDROCHLORIDE 0.4 MG: 0.4 CAPSULE ORAL at 21:06

## 2022-09-03 RX ADMIN — GABAPENTIN 100 MG: 100 CAPSULE ORAL at 17:00

## 2022-09-03 RX ADMIN — ALPRAZOLAM 0.25 MG: 0.25 TABLET ORAL at 21:07

## 2022-09-03 RX ADMIN — DEXAMETHASONE SODIUM PHOSPHATE 4 MG: 4 INJECTION, SOLUTION INTRAMUSCULAR; INTRAVENOUS at 21:00

## 2022-09-03 RX ADMIN — METHOCARBAMOL TABLETS 750 MG: 750 TABLET, COATED ORAL at 17:00

## 2022-09-03 RX ADMIN — OXYCODONE AND ACETAMINOPHEN 1 TABLET: 5; 325 TABLET ORAL at 06:28

## 2022-09-03 RX ADMIN — METHOCARBAMOL TABLETS 750 MG: 750 TABLET, COATED ORAL at 09:39

## 2022-09-03 RX ADMIN — ENOXAPARIN SODIUM 40 MG: 100 INJECTION SUBCUTANEOUS at 09:39

## 2022-09-03 RX ADMIN — TRAZODONE HYDROCHLORIDE 50 MG: 50 TABLET ORAL at 21:06

## 2022-09-03 RX ADMIN — OXYCODONE AND ACETAMINOPHEN 1 TABLET: 5; 325 TABLET ORAL at 13:21

## 2022-09-03 RX ADMIN — PANTOPRAZOLE SODIUM 40 MG: 40 TABLET, DELAYED RELEASE ORAL at 06:30

## 2022-09-03 RX ADMIN — ACETAMINOPHEN 650 MG: 325 TABLET, FILM COATED ORAL at 21:07

## 2022-09-03 RX ADMIN — GABAPENTIN 100 MG: 100 CAPSULE ORAL at 09:39

## 2022-09-03 RX ADMIN — METOPROLOL TARTRATE 12.5 MG: 25 TABLET, FILM COATED ORAL at 13:21

## 2022-09-03 RX ADMIN — DEXAMETHASONE SODIUM PHOSPHATE 10 MG: 10 INJECTION, SOLUTION INTRAMUSCULAR; INTRAVENOUS at 06:28

## 2022-09-03 NOTE — PROGRESS NOTES
Met with patient and his wife to review and sign observation letter. Patient lives in a home with 5 steps to enter, therapy is working with him and defer to orthopedics regarding any further therapy needs at home and nursing visits. Order received for rolling walker and it has been taken to his room, signed for and paperwork sent in [employer] to [place] to continue to follow and coordinate further services if need is identified.

## 2022-09-03 NOTE — PROGRESS NOTES
Bedside and Verbal shift change report given to Lazarus Chough (oncoming nurse) by Lestine Cushing (offgoing nurse). Report included the following information SBAR, Kardex, ED Summary, MAR, and Recent Results.

## 2022-09-03 NOTE — ED NOTES
TRANSFER - OUT REPORT:    Verbal report given to CAITLIN ALBERT(name) on Bhavin Morley  being transferred to [facility](unit) for routine progression of care       Report consisted of patients Situation, Background, Assessment and   Recommendations(SBAR). Information from the following report(s) SBAR, Kardex, ED Summary, Recent Results, Med Rec Status, and Cardiac Rhythm DEV  was reviewed with the receiving nurse. Lines:   Peripheral IV 09/02/22 Right Antecubital (Active)   Site Assessment Clean, dry, & intact 09/02/22 1159   Phlebitis Assessment 0 09/02/22 1159   Infiltration Assessment 0 09/02/22 1159   Dressing Status Clean, dry, & intact 09/02/22 1159   Dressing Type Transparent 09/02/22 1159   Hub Color/Line Status Pink 09/02/22 1159        Opportunity for questions and clarification was provided.       Patient transported with:   Monitor

## 2022-09-03 NOTE — PROGRESS NOTES
Problem: Mobility Impaired (Adult and Pediatric)  Goal: *Acute Goals and Plan of Care (Insert Text)  Description: FUNCTIONAL STATUS PRIOR TO ADMISSION: Patient was independent and active without use of DME. However over last 2 weeks increasing LBP radiating into left glut and posterior left leg pain which became intractable. Difficulty walking. Came to ED by EMS. HOME SUPPORT PRIOR TO ADMISSION: The patient lived with wife but did not require assist prior to acute radicular left leg and hip pain (chronic LBP). Physical Therapy Goals  Initiated 9/3/2022    1. Patient will ambulate with modified independence for 100 feet with the least restrictive device within 4 days. 2. Patient will ascend/descend 5 stairs with 2 handrail(s) with modified independence within 4 days. 3. Patient will verbalize and demonstrate understanding of spinal precautions (No bending, lifting greater than 5 lbs, or twisting; log-roll technique; frequent repositioning as instructed) within 4 days. Note:   PHYSICAL THERAPY EVALUATION  Patient: Maty Oviedo (38 y.o. male)  Date: 9/3/2022  Primary Diagnosis: Back pain [M54.9]       Precautions:   Back, Fall    ASSESSMENT  Based on the objective data described below, the patient is 80 yo male who was arrived yesterday by EMS with acute radicular LLE intractable pain, chronic LBP and unable to amb due to pain at home. Progressively worsened over last 2 weeks. Not had sciatica pain in past, always localized to lumbar spine. Bladder frequency significantly increased at home but denies numbness or paraesthesias BLE. He presents with mild LLE weakness (4/5) compared to RLE (5/5), pain particularly with standing and short distance amb, relieved with sitting or supine. MRI confirms stenosis L4-5 consistent with sciatica symptoms. His pain is better than arrival to ED yesterday, at rest in supin 2-3 out of 10 but increases to 7-8/10 with WBAT amb.  Patient had difficulty coordinating sequence with RW or crutch to unload LLE for pain relief. Educated on sciatica, home activity, piriformis stretch in supine, use of modalities ice and heat. Patient and wife verbalized understanding. Provided visual handout instructions of progressive piriformis stretch as well as foam wedge for positioning at home. Issued handout for OTC TENS unit that may help alleviate pain. Attained lumbar wrap and 4 ice packs, donned wrap on patient in sitting and patient reporting ice helping. Informed Dr. Bailey Tapia as well as nurse Dewey Carballo of session. Recommendations per orthopedics plan of care      Current Level of Function Impacting Discharge (mobility/balance): MoD I for amb to/from bathroom without device, fair balance but still severe pain for distances > 10' and unable to tolerate > 10' amb. Trial use of RW to unload LLE during amb. Difficulty coordinating gait due to polypharmacy and pain but ordered RW for when discharged. Functional Outcome Measure: The patient scored Total Score: 25/28 on the Tinetti outcome measure which is indicative of low fall risk. Other factors to consider for discharge: pain management     Patient will benefit from skilled therapy intervention to address the above noted impairments. PLAN :  Recommendations and Planned Interventions: gait training, therapeutic exercises, patient and family training/education, and therapeutic activities      Frequency/Duration: Patient will be followed by physical therapy:  5 times a week to address goals. Recommendation for discharge: (in order for the patient to meet his/her long term goals)  To be determined: per ortho recommendations    This discharge recommendation:  Has not yet been discussed the attending provider and/or case management    IF patient discharges home will need the following DME: rolling walker         SUBJECTIVE:   Patient stated it eventually subsides when I lay down .     OBJECTIVE DATA SUMMARY:   HISTORY:    Past Medical History:   Diagnosis Date    Anxiety     Arthritis     BPH (benign prostatic hyperplasia)     Chronic pain     Coronary artery disease 1988    GERD (gastroesophageal reflux disease)     HTN (hypertension), benign     Rotator cuff tear     RIGHT      Past Surgical History:   Procedure Laterality Date    HX ADENOIDECTOMY  1955    HX APPENDECTOMY  1955    HX COLONOSCOPY      HX CORONARY ARTERY BYPASS GRAFT  1986    HX ORTHOPAEDIC Right 09/17/2021    Middle finger trigger release    HX POLYPECTOMY      HX TONSILLECTOMY  1955    HX VASECTOMY  1990       Personal factors and/or comorbidities impacting plan of care: anxiety, chronic LBP with previous SHILPA    Home Situation  Home Environment: Private residence  # Steps to Enter: 5  Rails to Enter: Yes  Hand Rails : Bilateral  One/Two Story Residence: One story  Living Alone: No  Support Systems: Spouse/Significant Other  Patient Expects to be Discharged to[de-identified] Home  Current DME Used/Available at Home: [address] [facility], [place], [place], [address] [facility] Clicktivated chair  Tub or Shower Type: Shower    EXAMINATION/PRESENTATION/DECISION MAKING:   Critical Behavior:  Neurologic State: Alert, Appropriate for age  Orientation Level: Oriented X4        Hearing:   Auditory  Auditory Impairment: Hard of hearing, bilateral, Hearing aid(s)    Range Of Motion:  AROM: Within functional limits                       Strength:    Strength: Generally decreased, functional (weakness thru LLE compared to RLE 4/5 vs 5/5)                    Tone & Sensation:   Tone: Normal              Sensation: Intact               Coordination:  Coordination: Generally decreased, functional  Vision:   Corrective Lenses: Glasses  Functional Mobility:  Bed Mobility:  Rolling: Independent  Supine to Sit: Independent  Sit to Supine: Independent  Scooting: Independent  Transfers:  Sit to Stand: Modified independent  Stand to Sit: Modified independent  Stand Pivot Transfers: Modified independent     Bed to Chair: Modified independent Balance:   Sitting: Intact; Without support  Standing: Intact; Without support  Ambulation/Gait Training:  Distance (ft): 10 Feet (ft)  Assistive Device: Gait belt; Other (comment); Walker, rolling (reaches for wall or counter at times to steady self)  Ambulation - Level of Assistance: Modified independent        Gait Abnormalities: Antalgic; Step to gait;Trunk sway increased     Left Side Weight Bearing: As tolerated (pain with FWB)  Base of Support: Narrowed     Speed/Darlene: Slow       Stairs - Level of Assistance:  (NT)        Therapeutic Exercises:   Piriformis stretch    Functional Measure:  Tinetti test:    Sitting Balance: 1  Arises: 1  Attempts to Rise: 2  Immediate Standing Balance: 2  Standing Balance: 2  Nudged: 2  Eyes Closed: 1  Turn 360 Degrees - Continuous/Discontinuous: 1  Turn 360 Degrees - Steady/Unsteady: 1  Sitting Down: 1  Balance Score: 14 Balance total score  Indication of Gait: 1  R Step Length/Height: 1  L Step Length/Height: 1  R Foot Clearance: 1  L Foot Clearance: 1  Step Symmetry: 1  Step Continuity: 1  Path: 1  Trunk: 2  Walking Time: 1  Gait Score: 11 Gait total score  Total Score: 25/28 Overall total score         Tinetti Tool Score Risk of Falls  <19 = High Fall Risk  19-24 = Moderate Fall Risk  25-28 = Low Fall Risk  Tinetti ME. Performance-Oriented Assessment of Mobility Problems in Elderly Patients. Pedraza 66; [id number].  (Scoring Description: PT Bulletin Feb. 10, 1993)    Older adults: Yasmani Dove et al, 2009; n = 1000 [facility] elderly evaluated with ABC, DEMAR, ADL, and IADL)  · Mean DEMAR score for males aged 69-68 years = 26.21(3.40)  · Mean DEMAR score for females age 69-68 years = 25.16(4.30)  · Mean DEMAR score for males over 80 years = 23.29(6.02)  · Mean DEMAR score for females over 80 years = 17.20(8.32)        Physical Therapy Evaluation Charge Determination   History Examination Presentation Decision-Making   MEDIUM  Complexity : 1-2 comorbidities / personal factors will impact the outcome/ POC  MEDIUM Complexity : 3 Standardized tests and measures addressing body structure, function, activity limitation and / or participation in recreation  MEDIUM Complexity : Evolving with changing characteristics  Other outcome measures Tinetti Test  LOW       Based on the above components, the patient evaluation is determined to be of the following complexity level: LOW     Pain Ratin/10 rest, 8/10 with movement    Activity Tolerance:   Good      After treatment patient left in no apparent distress:   Supine in bed, Heels elevated for pressure relief, Call bell within reach, Caregiver / family present, and ice pack wrap in place    COMMUNICATION/EDUCATION:   The patients plan of care was discussed with: Registered nurse and Physician. Fall prevention education was provided and the patient/caregiver indicated understanding. and Patient/family have participated as able in goal setting and plan of care.     Thank you for this referral.  Deshaun Montilla, PT, DPT   Time Calculation: 51 mins

## 2022-09-03 NOTE — PROGRESS NOTES
[facility]  [address] [place] [address] 19  [phone]    Medical Progress Note      NAME: Mckayla Causey   :  1940  MRM:  [id number]    Date/Time of service 9/3/2022  11:14 AM          Assessment and Plan:     Acute lumbar back pain / Generalized weakness / Arthritis / Chronic pain - POA, ortho consulted by ER. No acute surgical needs. Admit for steroids, pain control and PT OT eval.   Fall precautions. Continue tylenol, motrin, lidoderm  and prn percocet. Added gabapentin and robaxin. Not better     Coronary artery disease / HTN (hypertension), benign - POA, appears stable. Start metoprolol low dose. Hold Norvasc. Continue statin. Hold ASA in case procedure is needed. Hyperlipidemia - Continue atorvastatin     Urine retention / BPH (benign prostatic hyperplasia) - Continue Flomax and check PVR. Insomnia / Anxiety - Continue trazodone and prn xanax       Subjective:     Chief Complaint:  back pain not any better      ROS:  (bold if positive, if negative)    Tolerating minimal PT  Tolerating Diet        Objective:     Last 24hrs VS reviewed since prior progress note.  Most recent are:    Visit Vitals  BP (!) 156/76 (BP 1 Location: Left upper arm, BP Patient Position: At rest)   Pulse 80   Temp 98.6 °F (37 °C)   Resp 17   Ht 5' 7\" (1.702 m)   Wt 65.8 kg (145 lb)   SpO2 96%   BMI 22.71 kg/m²     SpO2 Readings from Last 6 Encounters:   22 96%   21 98%   21 95%   21 96%   21 98%   20 98%        No intake or output data in the 24 hours ending 22 0954     Physical Exam:    Gen:  Frail, in acute distress  HEENT:  Pink conjunctivae, PERRL, hearing intact to voice, moist mucous membranes  Neck:  Supple, without masses, thyroid non-tender  Resp:  No accessory muscle use, clear breath sounds without wheezes rales or rhonchi  Card:  No murmurs, normal S1, S2 without thrills, bruits or peripheral edema  Abd:  Soft, non-tender, non-distended, normoactive bowel sounds are present, no mass  Lymph:  No cervical or inguinal adenopathy  Musc:  No cyanosis or clubbing  Skin:  No rashes or ulcers, skin turgor is good  Neuro:  Cranial nerves are grossly intact, pain and motor weakness, follows commands appropriately  Psych:  Good insight, oriented to person, place and time, alert    Telemetry reviewed:   normal sinus rhythm  __________________________________________________________________  Medications Reviewed: (see below)  Medications:     Current Facility-Administered Medications   Medication Dose Route Frequency    ALPRAZolam (XANAX) tablet 0.25 mg  0.25 mg Oral QHS    atorvastatin (LIPITOR) tablet 40 mg  40 mg Oral QHS    pantoprazole (PROTONIX) tablet 40 mg  40 mg Oral ACB    tamsulosin (FLOMAX) capsule 0.4 mg  0.4 mg Oral QHS    traZODone (DESYREL) tablet 50 mg  50 mg Oral QHS    acetaminophen (TYLENOL) tablet 650 mg  650 mg Oral Q6H PRN    polyethylene glycol (MIRALAX) packet 17 g  17 g Oral DAILY PRN    ondansetron (ZOFRAN) injection 4 mg  4 mg IntraVENous Q6H PRN    enoxaparin (LOVENOX) injection 40 mg  40 mg SubCUTAneous DAILY    0.9% sodium chloride infusion  75 mL/hr IntraVENous CONTINUOUS    oxyCODONE-acetaminophen (PERCOCET) 5-325 mg per tablet 1 Tablet  1 Tablet Oral Q6H PRN    methocarbamoL (ROBAXIN) tablet 750 mg  750 mg Oral TID    gabapentin (NEURONTIN) capsule 100 mg  100 mg Oral BID    ibuprofen (MOTRIN) tablet 400 mg  400 mg Oral Q4H PRN    dexamethasone (PF) (DECADRON) 10 mg/mL injection 10 mg  10 mg IntraVENous Q8H    HYDROmorphone (DILAUDID) syringe 0.5 mg  0.5 mg IntraVENous Q3H PRN        Lab Data Reviewed: (see below)  Lab Review:     Recent Labs     09/03/22  0638 09/02/22  1158   WBC 6.4 10.5   HGB 14.6 14.7   HCT 43.0 44.0    188     Recent Labs     09/03/22  0638 09/02/22  1158    139   K 4.1 4.0    105   CO2 25 28   * 85   BUN 22* 22*   CREA 0.92 1.03   CA 8.8 8.9   MG 2.2 --    ALB 3.5 3.7   TBILI 0.5 0.5   ALT 26 26     Lab Results   Component Value Date/Time    Glucose (POC) 93 09/02/2022 10:59 AM    Glucose (POC) 167 (H) 04/19/2019 08:54 AM    Glucose (POC) 125 (H) 02/15/2014 07:36 PM     No results for input(s): PH, PCO2, PO2, HCO3, FIO2 in the last 72 hours. No results for input(s): INR, INREXT in the last 72 hours. All Micro Results       Procedure Component Value Units Date/Time    URINE CULTURE HOLD SAMPLE [[id number]] Collected: 09/02/22 1042    Order Status: Completed Specimen: Urine from Serum Updated: 09/02/22 1053     Urine culture hold       Urine on hold in Microbiology dept for 2 days. If unpreserved urine is submitted, it cannot be used for addtional testing after 24 hours, recollection will be required. Other pertinent lab: none    Total time spent with patient: 30 Minutes I personally reviewed chart, notes, data and current medications in the medical record. I have personally examined and treated the patient at bedside during this period. To assist coordination of care and communication with nursing and staff, this note may be preliminary early in the day, but finalized by end of the day.                  Care Plan discussed with: Patient, Family, Nursing Staff, Consultant/Specialist, and >50% of time spent in counseling and coordination of care    Discussed:  Care Plan and D/C Planning    Prophylaxis:  SCD's and H2B/PPI    Disposition:  SNF/LTC and HH PT, OT, RN           ___________________________________________________    Attending Physician: Jose Torres MD

## 2022-09-03 NOTE — PROGRESS NOTES
OCCUPATIONAL THERAPY EVALUATION/DISCHARGE  Patient: Mckayla Causey (32 y.o. male)  Date: 9/3/2022  Primary Diagnosis: Back pain [M54.9]       Precautions:  Back, Fall    ASSESSMENT  Based on the objective data described below, the patient presents with ability to complete basic ADL tasks with supervision-mod indep s/p admission for back pain with radiculopathy down LLE. MRI with stenosis at L4-5, conservative treatment, WBAT by ortho. Nursing cleared for therapy. Patient received up ad ena in bathroom completing toileting and grooming tasks, no AD present. He ambulated without AD with supervision from bathroom to bed. He reports increased pain in LLE with additional time in static standing and extensive mobility which has progressed over the last three weeks. He reports pain relief with sitting however noted during OT session, limited unsupported static sitting tolerance even with BUE support secondary to increased low back pain. Provided verbal, visual demonstration and hand out for back precautions for conservative mgmt. Recommend use of reacher for item retrieval and distal dressing tasks as needed if L hip pain limits crossed leg dressing. Patient with good verbal understanding however needing vc for log rolling and crossed leg dressing vs attempt at forward flexion. Wife able to provide assistance with ADL tasks as needed. Recommend discussion regarding mobility device with PT. Patient owns [facility] and standard walker however resistant to using AD secondary to [facility] to looks. \"  He denies any falls but does report LLE with decreased sensation with walking to mailbox recently. Current Level of Function (ADLs/self-care): supervision to mod indep    Functional Outcome Measure: The patient scored 80/100 on the Barthel Index outcome measure which is indicative of mild impairment with ADL tasks.       Other factors to consider for discharge: Patient demonstrating ability to complete ADL tasks with supervision to mod indep. He reports 10+/10 pain with additional time in standing, which he had completed prior to OT arrival, with sitting pain decreased to 7/10 and with semi car he reports continued decrease(did not quantify). Noted order for heat, patient may benefit from cryotherapy with more acute onset of pain. Discussed with PT. PLAN :  Recommendation for discharge: (in order for the patient to meet his/her long term goals)  No skilled occupational therapy/ follow up rehabilitation needs identified at this time. This discharge recommendation:  Has not yet been discussed the attending provider and/or case management    IF patient discharges home will need the following DME: AE: long handled bathing, AE: long handled dressing, shower chair, and mobility AD per PT       SUBJECTIVE:   Patient stated Well you know, I am not old. I don't want to look old.     OBJECTIVE DATA SUMMARY:   HISTORY:   Past Medical History:   Diagnosis Date    Anxiety     Arthritis     BPH (benign prostatic hyperplasia)     Chronic pain     Coronary artery disease 1988    GERD (gastroesophageal reflux disease)     HTN (hypertension), benign     Rotator cuff tear     RIGHT      Past Surgical History:   Procedure Laterality Date    HX ADENOIDECTOMY  1955    HX APPENDECTOMY  1955    HX COLONOSCOPY      HX CORONARY ARTERY BYPASS GRAFT  1986    HX ORTHOPAEDIC Right 09/17/2021    Middle finger trigger release    HX POLYPECTOMY      HX TONSILLECTOMY  1955    HX VASECTOMY  1990       Prior Level of Function/Environment/Context: INdep ADL tasks. Chronic back pain with increased pain over last three weeks.    Expanded or extensive additional review of patient history:     Home Situation  Home Environment: Private residence  # Steps to Enter: 5  Rails to Enter: Yes  Hand Rails : Bilateral  One/Two Story Residence: One story  Living Alone: No  Support Systems: Spouse/Significant Other  Patient Expects to be Discharged to[de-identified] Home  Current DME Used/Available at Home: Erquan Duncanet, straight, Walker, Shower chair  Tub or Shower Type: Shower    Hand dominance: Right    EXAMINATION OF PERFORMANCE DEFICITS:  Cognitive/Behavioral Status:  Neurologic State: Alert; Appropriate for age              Hearing: Auditory  Auditory Impairment: Hard of hearing, bilateral, Hearing aid(s)    Vision/Perceptual:                                Corrective Lenses: Glasses    Range of Motion:  AROM: Within functional limits                         Strength:  Strength: Generally decreased, functional (weakness thru LLE compared to RLE 4/5 vs 5/5)                Coordination:  Coordination: Generally decreased, functional  Fine Motor Skills-Upper: Left Intact; Right Intact    Gross Motor Skills-Upper: Left Intact; Right Intact    Tone & Sensation:  Tone: Normal  Sensation: Intact                      Balance:  Sitting: Intact; Without support  Standing: Intact; Without support    Functional Mobility and Transfers for ADLs:  Bed Mobility:  Rolling: Independent  Supine to Sit: Independent  Sit to Supine: Independent  Scooting: Independent    Transfers:  Sit to Stand: Modified independent  Stand to Sit: Modified independent  Bed to Chair: Modified independent  Bathroom Mobility: Supervision/set up  Toilet Transfer : Supervision    ADL Assessment:  Feeding: Independent    Oral Facial Hygiene/Grooming: Independent    Bathing: Supervision         Upper Body Dressing: Independent    Lower Body Dressing: Supervision    Toileting: Supervision                ADL Intervention and task modifications:       Grooming  Washing Hands: Independent    Lower Body Dressing Assistance  Socks: Supervision; Compensatory technique training    Toileting  Bladder Hygiene: Independent  Bowel Hygiene: Compensatory technique training         Patient instructed and demonstrated 3/3 back precautions with moderate cues. Issued hand out for precautions.    Patient instructed and indicated understanding the benefits of maintaining activity tolerance, functional mobility, and independence with self care tasks during acute stay  to ensure safe return home and to baseline. Encouraged patient to increase frequency and duration OOB, not sitting longer than 30 mins without marching/walking with staff, be out of bed for all meals, perform daily ADLs (as approved by RN/MD regarding bathing etc), and performing functional mobility to/from bathroom. Patient instruction and indicated understanding on body mechanics, ergonomics and gravitational force on the spine during different body positions to plan activities in prep for return home to complete basic ADLs, instrumental ADLs and back to work safely. Dressing lower body: Patient instructed  on the benefits to remain seated to don all clothing to increase independence with precautions and pain management. Patient instructed and demonstrated tailor sitting for lower body dressing. Toileting: Patient instructed on the benefits of using flushable wet wipes and toilet tongs if decreased reach or pain for gopal care. Also, the benefits of a reacher to aid in clothing management. Home safety: Patient instructed and indicated understanding on home modifications and safety [raise height of ADL objects (i.e. clothing, sink items, fridge items, items to mouth when grooming), appropriate height of chair surfaces, recliner safety, change of floor surfaces, clear pathways] to increase independence and fall prevention. Standing: Patient instructed and indicated understanding to walk up to sink/countertop/surfaces, step into walker, square off while using objects, slide objects along surfaces, to increase adherence to back precautions and fall prevention. Patient instructed to increase amount of time standing to increase independence and tolerance with ADLs. During prolonged standing, can open cabinet door or place foot on stool to decrease spinal pressure/increase pain. Functional Measure:    Barthel Index:  Bathin  Bladder: 10  Bowels: 10  Groomin  Dressing: 10  Feeding: 10  Mobility: 5  Stairs: 5  Toilet Use: 10  Transfer (Bed to Chair and Back): 10  Total: 80/100      The Barthel ADL Index: Guidelines  1. The index should be used as a record of what a patient does, not as a record of what a patient could do. 2. The main aim is to establish degree of independence from any help, physical or verbal, however minor and for whatever reason. 3. The need for supervision renders the patient not independent. 4. A patient's performance should be established using the best available evidence. Asking the patient, friends/relatives and nurses are the usual sources, but direct observation and common sense are also important. However direct testing is not needed. 5. Usually the patient's performance over the preceding 24-48 hours is important, but occasionally longer periods will be relevant. 6. Middle categories imply that the patient supplies over 50 per cent of the effort. 7. Use of aids to be independent is allowed. Score Interpretation (from [address] [address])    Independent   60-79 Minimally independent   40-59 Partially dependent   20-39 Very dependent   <20 Totally dependent     -Dat Pope., Barthel, D.W. (1965). Functional evaluation: the Barthel Index. [facility] ([address] (1997). The Barthel activities of daily living index: self-reporting versus actual performance in the old (> or = 75 years). Journal of [address] [place] [address] 45(7), [facility], J.J.MRomanaF, Thad Jessica., [facility]. (1999). Measuring the change in disability after inpatient rehabilitation; comparison of the responsiveness of the Barthel Index and Functional [place] Measure. Journal of Neurology, Neurosurgery, and Psychiatry, 66(4), 647-843.   -Escobar Kim, [place].MARY, NATALIIA Tran, Emily Krueger, M.A. (2004) Assessment of post-stroke quality of life in cost-effectiveness studies: The usefulness of the Barthel Index and the EuroQoL-5D. Quality of Life Research, 15, 235-12        Occupational Therapy Evaluation Charge Determination   History Examination Decision-Making   LOW Complexity : Brief history review  LOW Complexity : 1-3 performance deficits relating to physical, cognitive , or psychosocial skils that result in activity limitations and / or participation restrictions  LOW Complexity : No comorbidities that affect functional and no verbal or physical assistance needed to complete eval tasks       Based on the above components, the patient evaluation is determined to be of the following complexity level: LOW   Pain Ratin/10 sitting EOB at beginning of session, decrease pain in semi car    Activity Tolerance:   Fair    After treatment patient left in no apparent distress:    Supine in bed, Call bell within reach, and Caregiver / family present    COMMUNICATION/EDUCATION:   The patients plan of care was discussed with: Physical therapist and Registered nurse.      Thank you for this referral.  Jayden Fuentes OT  Time Calculation: 19 mins

## 2022-09-03 NOTE — PROGRESS NOTES
Ortho:    Pt seen yesterday for acute on chronic LBP and LLE radiculopathy   Non surgical treatment    Recommended steroids, muscle relaxer, PT/OT  Pain medication per primary team    Steroid has been discontinued -  will re-order IV decodron 4mg Q12  Change robaxin to valium 5mg PRN    Sundar Woods PA-C

## 2022-09-04 VITALS
SYSTOLIC BLOOD PRESSURE: 142 MMHG | OXYGEN SATURATION: 98 % | BODY MASS INDEX: 22.76 KG/M2 | TEMPERATURE: 98.3 F | HEART RATE: 52 BPM | RESPIRATION RATE: 18 BRPM | DIASTOLIC BLOOD PRESSURE: 69 MMHG | HEIGHT: 67 IN | WEIGHT: 145 LBS

## 2022-09-04 PROCEDURE — 74011250636 HC RX REV CODE- 250/636: Performed by: INTERNAL MEDICINE

## 2022-09-04 PROCEDURE — G0378 HOSPITAL OBSERVATION PER HR: HCPCS

## 2022-09-04 PROCEDURE — 74011250637 HC RX REV CODE- 250/637: Performed by: INTERNAL MEDICINE

## 2022-09-04 PROCEDURE — 74011250637 HC RX REV CODE- 250/637: Performed by: PHYSICIAN ASSISTANT

## 2022-09-04 PROCEDURE — 97116 GAIT TRAINING THERAPY: CPT

## 2022-09-04 PROCEDURE — 96372 THER/PROPH/DIAG INJ SC/IM: CPT

## 2022-09-04 PROCEDURE — 74011250636 HC RX REV CODE- 250/636: Performed by: PHYSICIAN ASSISTANT

## 2022-09-04 PROCEDURE — 96376 TX/PRO/DX INJ SAME DRUG ADON: CPT

## 2022-09-04 RX ORDER — GABAPENTIN 100 MG/1
100 CAPSULE ORAL 3 TIMES DAILY
Qty: 90 CAPSULE | Refills: 0 | Status: SHIPPED | OUTPATIENT
Start: 2022-09-04 | End: 2022-10-04

## 2022-09-04 RX ORDER — AMLODIPINE BESYLATE 5 MG/1
10 TABLET ORAL DAILY
Status: DISCONTINUED | OUTPATIENT
Start: 2022-09-04 | End: 2022-09-04 | Stop reason: HOSPADM

## 2022-09-04 RX ORDER — POLYETHYLENE GLYCOL 3350 17 G/17G
17 POWDER, FOR SOLUTION ORAL DAILY
Qty: 30 PACKET | Refills: 0 | Status: SHIPPED | OUTPATIENT
Start: 2022-09-04 | End: 2022-10-04

## 2022-09-04 RX ORDER — LISINOPRIL 5 MG/1
10 TABLET ORAL DAILY
Status: DISCONTINUED | OUTPATIENT
Start: 2022-09-04 | End: 2022-09-04 | Stop reason: HOSPADM

## 2022-09-04 RX ORDER — PREDNISONE 10 MG/1
TABLET ORAL
Qty: 21 TABLET | Refills: 0 | Status: SHIPPED | OUTPATIENT
Start: 2022-09-04

## 2022-09-04 RX ORDER — AMLODIPINE BESYLATE 10 MG/1
10 TABLET ORAL DAILY
Qty: 30 TABLET | Refills: 0 | Status: SHIPPED | OUTPATIENT
Start: 2022-09-04 | End: 2022-10-04

## 2022-09-04 RX ORDER — DIAZEPAM 5 MG/1
5 TABLET ORAL
Qty: 10 TABLET | Refills: 0 | Status: SHIPPED | OUTPATIENT
Start: 2022-09-04 | End: 2022-09-14

## 2022-09-04 RX ORDER — OXYCODONE AND ACETAMINOPHEN 5; 325 MG/1; MG/1
1 TABLET ORAL
Qty: 10 TABLET | Refills: 0 | Status: SHIPPED | OUTPATIENT
Start: 2022-09-04 | End: 2022-09-07

## 2022-09-04 RX ADMIN — OXYCODONE AND ACETAMINOPHEN 1 TABLET: 5; 325 TABLET ORAL at 04:10

## 2022-09-04 RX ADMIN — LISINOPRIL 10 MG: 5 TABLET ORAL at 10:36

## 2022-09-04 RX ADMIN — DEXAMETHASONE SODIUM PHOSPHATE 4 MG: 4 INJECTION, SOLUTION INTRAMUSCULAR; INTRAVENOUS at 08:59

## 2022-09-04 RX ADMIN — ENOXAPARIN SODIUM 40 MG: 100 INJECTION SUBCUTANEOUS at 08:58

## 2022-09-04 RX ADMIN — GABAPENTIN 100 MG: 100 CAPSULE ORAL at 08:59

## 2022-09-04 RX ADMIN — PANTOPRAZOLE SODIUM 40 MG: 40 TABLET, DELAYED RELEASE ORAL at 06:18

## 2022-09-04 RX ADMIN — AMLODIPINE BESYLATE 10 MG: 5 TABLET ORAL at 09:00

## 2022-09-04 NOTE — DISCHARGE SUMMARY
Physician Discharge Summary     Patient ID:  Lopez Phillips  [id number]  81 y.o.  1940    Admit date: 9/2/2022    Discharge date of service and time: 9/4/2022    Admission Diagnoses: Back pain [M54.9]    Discharge Diagnoses:    Principal Diagnosis   Acute lumbar back pain                                             Hospital Course and other diagnoses  Acute lumbar back pain / Generalized weakness / Arthritis / Chronic pain - POA, ortho consulted by ER. No acute surgical needs. Continue steroids, pain control, relaxants and PT OT eval.   Fall precautions. Finally pain improve after tylenol, motrin, lidoderm, gabapentin, robaxin and prn percocet. Now better, does not want IPR/SNF, wants HHPT     Coronary artery disease / HTN (hypertension), benign - POA, appears stable. Became bradycardic on metoprolol low dose. Stop that and resume Norvasc. Add lisinopril. Continue statin. Hold ASA in case procedure is needed. Hyperlipidemia - Continue atorvastatin     Urine retention / BPH (benign prostatic hyperplasia) - Continue Flomax and check PVR. Insomnia / Anxiety - Continue trazodone and prn xanax     PCP: Tammie Becerra MD    Consults: Orthopedic Surgery    Significant Diagnostic Studies: See Hospital Course    Discharged home in improved condition.     Discharge Exam:  BP (!) 151/74 (BP 1 Location: Left upper arm, BP Patient Position: At rest)   Pulse (!) 51   Temp 97.6 °F (36.4 °C)   Resp 16   Ht 5' 7\" (1.702 m)   Wt 65.8 kg (145 lb)   SpO2 95%   BMI 22.71 kg/m²      Gen:  Frail, in acute distress  HEENT:  Pink conjunctivae, PERRL, hearing intact to voice, moist mucous membranes  Neck:  Supple, without masses, thyroid non-tender  Resp:  No accessory muscle use, clear breath sounds without wheezes rales or rhonchi  Card:  No murmurs, normal S1, S2 without thrills, bruits or peripheral edema  Abd:  Soft, non-tender, non-distended, normoactive bowel sounds are present, no mass  Lymph:  No cervical or inguinal adenopathy  Musc:  No cyanosis or clubbing  Skin:  No rashes or ulcers, skin turgor is good  Neuro:  Cranial nerves are grossly intact, pain and motor weakness, follows commands appropriately  Psych:  Good insight, oriented to person, place and time, alert    Patient Instructions:   Current Discharge Medication List        START taking these medications    Details   predniSONE (STERAPRED DS) 10 mg dose pack See administration instruction per 10mg dose pack  Qty: 21 Tablet, Refills: 0      oxyCODONE-acetaminophen (PERCOCET) 5-325 mg per tablet Take 1 Tablet by mouth every six (6) hours as needed for Pain for up to 3 days. Max Daily Amount: 4 Tablets. Qty: 10 Tablet, Refills: 0    Associated Diagnoses: Acute left-sided low back pain with left-sided sciatica      polyethylene glycol (MIRALAX) 17 gram packet Take 1 Packet by mouth daily for 30 days. Qty: 30 Packet, Refills: 0      diazePAM (VALIUM) 5 mg tablet Take 1 Tablet by mouth every six (6) hours as needed for Muscle Spasm(s) for up to 10 days. Max Daily Amount: 20 mg.  Qty: 10 Tablet, Refills: 0    Associated Diagnoses: Acute left-sided low back pain with left-sided sciatica      gabapentin (NEURONTIN) 100 mg capsule Take 1 Capsule by mouth three (3) times daily for 30 days. Max Daily Amount: 300 mg. Qty: 90 Capsule, Refills: 0    Associated Diagnoses: Acute left-sided low back pain with left-sided sciatica           CONTINUE these medications which have CHANGED    Details   amLODIPine (NORVASC) 10 mg tablet Take 1 Tablet by mouth daily for 30 days. Qty: 30 Tablet, Refills: 0           CONTINUE these medications which have NOT CHANGED    Details   traZODone (DESYREL) 100 mg tablet Take 50 mg by mouth nightly. vit A/vit C/vit E/zinc/copper (PRESERVISION AREDS PO) Take 1 Tablet by mouth daily. omega-3/dha/epa/fish oil (OMEGA-3 FISH OIL PO) Take 500 mg by mouth daily.       ascorbic acid, vitamin C, (Vitamin C) 1,000 mg tablet Take 1,000 mg by mouth daily. ALPRAZolam (XANAX) 0.5 mg tablet Take 0.25 mg by mouth nightly. testosterone cypionate (DEPOTESTOTERONE CYPIONATE) 200 mg/mL injection 200 mg by IntraMUSCular route every fourteen (14) days. tamsulosin (FLOMAX) 0.4 mg capsule Take 0.4 mg by mouth nightly. naproxen sodium (ALEVE PO) Take 1 Tablet by mouth as needed. omeprazole (PRILOSEC) 40 mg capsule Take 40 mg by mouth daily (after breakfast). aspirin 81 mg chewable tablet Take 81 mg by mouth daily. atorvastatin (LIPITOR) 40 mg tablet Take 40 mg by mouth nightly. Activity: Activity as tolerated and PT/OT per Home Health  Diet: Regular Diet and Increase noncaffeinated fluids  Wound Care: None needed    Follow-up with your PCP in a few weeks.   Follow-up tests/labs - none    Signed:  Bernarda Orellana MD  9/4/2022  10:21 AM

## 2022-09-04 NOTE — PROGRESS NOTES
11:56 AM  Consult received for [facility] follow up, working to obtain preference and availability of staffing. RW given to patient yesterday.

## 2022-09-04 NOTE — DISCHARGE INSTRUCTIONS
Patient Discharge Instructions    Manohar Sanchez / [id number] : 1940    Admitted 2022 Discharged: 2022     Primary Diagnoses  @Rprob@    Take Home Medications     It is important that you take the medication exactly as they are prescribed. Keep your medication in the bottles provided by the pharmacist and keep a list of the medication names, dosages, and times to be taken in your wallet. Do not take other medications without consulting your doctor. What to do at Home    Recommended diet: Regular Diet and Increase noncaffeinated fluids    Recommended activity: Activity as tolerated and PT/OT per Home Health    If you experience worse symptoms, please follow up with DR Isha Crockett in orthopedics. Follow-up with your PCP in a few week    [unfilled]     Information obtained by :  I understand that if any problems occur once I am at home I am to contact my physician. I understand and acknowledge receipt of the instructions indicated above.                                                                                                                                            Physician's or R.N.'s Signature                                                                  Date/Time                                                                                                                                              Patient or Representative Signature                                                          Date/Time

## 2022-09-04 NOTE — PROGRESS NOTES
Pt is discharged. Paper work is given so as education. 2 hard prescriptions given and explained to the pt and rest went to  his pharmacy. [facility] are out and volunteer will take him down. Wife is at the bed side.

## 2022-09-04 NOTE — PROGRESS NOTES
[facility]  [address], [facility] [address]  [phone]    Medical Progress Note      NAME: Maury Waterman   :  1940  MRM:  [id number]    Date/Time of service 2022  8:06 AM          Assessment and Plan:     Acute lumbar back pain / Generalized weakness / Arthritis / Chronic pain - POA, ortho consulted by ER. No acute surgical needs. Continue steroids, pain control, relaxants and PT OT eval.   Fall precautions. Finally pain improve after tylenol, motrin, lidoderm, gabapentin, robaxin and prn percocet. Now better, does not want IPR/SNF, wants HHPT     Coronary artery disease / HTN (hypertension), benign - POA, appears stable. Became bradycardic on metoprolol low dose. Stop that and resume Norvasc. Add lisinopril. Continue statin. Hold ASA in case procedure is needed. Hyperlipidemia - Continue atorvastatin     Urine retention / BPH (benign prostatic hyperplasia) - Continue Flomax and check PVR. Insomnia / Anxiety - Continue trazodone and prn xanax       Subjective:     Chief Complaint:  back pain a bit better      ROS:  (bold if positive, if negative)    Tolerating minimal PT  Tolerating Diet        Objective:     Last 24hrs VS reviewed since prior progress note.  Most recent are:    Visit Vitals  BP (!) 151/74 (BP 1 Location: Left upper arm, BP Patient Position: At rest)   Pulse (!) 51   Temp 97.6 °F (36.4 °C)   Resp 16   Ht 5' 7\" (1.702 m)   Wt 65.8 kg (145 lb)   SpO2 95%   BMI 22.71 kg/m²     SpO2 Readings from Last 6 Encounters:   22 95%   21 98%   21 95%   21 96%   21 98%   20 98%          Intake/Output Summary (Last 24 hours) at 2022 0806  Last data filed at 9/3/2022 1704  Gross per 24 hour   Intake 1440 ml   Output --   Net 1440 ml          Physical Exam:    Gen:  Frail, in acute distress  HEENT:  Pink conjunctivae, PERRL, hearing intact to voice, moist mucous membranes  Neck:  Supple, without masses, thyroid non-tender  Resp:  No accessory muscle use, clear breath sounds without wheezes rales or rhonchi  Card:  No murmurs, normal S1, S2 without thrills, bruits or peripheral edema  Abd:  Soft, non-tender, non-distended, normoactive bowel sounds are present, no mass  Lymph:  No cervical or inguinal adenopathy  Musc:  No cyanosis or clubbing  Skin:  No rashes or ulcers, skin turgor is good  Neuro:  Cranial nerves are grossly intact, pain and motor weakness, follows commands appropriately  Psych:  Good insight, oriented to person, place and time, alert    Telemetry reviewed:   normal sinus rhythm  __________________________________________________________________  Medications Reviewed: (see below)  Medications:     Current Facility-Administered Medications   Medication Dose Route Frequency    ALPRAZolam (XANAX) tablet 0.25 mg  0.25 mg Oral QHS    [Held by provider] metoprolol tartrate (LOPRESSOR) tablet 12.5 mg  12.5 mg Oral Q12H    diazePAM (VALIUM) tablet 5 mg  5 mg Oral Q6H PRN    dexamethasone (DECADRON) 4 mg/mL injection 4 mg  4 mg IntraVENous Q12H    amLODIPine (NORVASC) tablet 2.5 mg  2.5 mg Oral BID    atorvastatin (LIPITOR) tablet 40 mg  40 mg Oral QHS    pantoprazole (PROTONIX) tablet 40 mg  40 mg Oral ACB    tamsulosin (FLOMAX) capsule 0.4 mg  0.4 mg Oral QHS    traZODone (DESYREL) tablet 50 mg  50 mg Oral QHS    acetaminophen (TYLENOL) tablet 650 mg  650 mg Oral Q6H PRN    polyethylene glycol (MIRALAX) packet 17 g  17 g Oral DAILY PRN    ondansetron (ZOFRAN) injection 4 mg  4 mg IntraVENous Q6H PRN    enoxaparin (LOVENOX) injection 40 mg  40 mg SubCUTAneous DAILY    0.9% sodium chloride infusion  75 mL/hr IntraVENous CONTINUOUS    oxyCODONE-acetaminophen (PERCOCET) 5-325 mg per tablet 1 Tablet  1 Tablet Oral Q6H PRN    gabapentin (NEURONTIN) capsule 100 mg  100 mg Oral BID    ibuprofen (MOTRIN) tablet 400 mg  400 mg Oral Q4H PRN    HYDROmorphone (DILAUDID) syringe 0.5 mg  0.5 mg IntraVENous Q3H PRN        Lab Data Reviewed: (see below)  Lab Review:     Recent Labs     09/03/22  0638 09/02/22  1158   WBC 6.4 10.5   HGB 14.6 14.7   HCT 43.0 44.0    188       Recent Labs     09/03/22  0638 09/02/22  1158    139   K 4.1 4.0    105   CO2 25 28   * 85   BUN 22* 22*   CREA 0.92 1.03   CA 8.8 8.9   MG 2.2  --    ALB 3.5 3.7   TBILI 0.5 0.5   ALT 26 26       Lab Results   Component Value Date/Time    Glucose (POC) 93 09/02/2022 10:59 AM    Glucose (POC) 167 (H) 04/19/2019 08:54 AM    Glucose (POC) 125 (H) 02/15/2014 07:36 PM     No results for input(s): PH, PCO2, PO2, HCO3, FIO2 in the last 72 hours. No results for input(s): INR, INREXT, INREXT in the last 72 hours. All Micro Results       Procedure Component Value Units Date/Time    URINE CULTURE HOLD SAMPLE [[id number]] Collected: 09/02/22 1042    Order Status: Completed Specimen: Urine from Serum Updated: 09/02/22 1053     Urine culture hold       Urine on hold in Microbiology dept for 2 days. If unpreserved urine is submitted, it cannot be used for addtional testing after 24 hours, recollection will be required. Other pertinent lab: none    Total time spent with patient: 30 Minutes I personally reviewed chart, notes, data and current medications in the medical record. I have personally examined and treated the patient at bedside during this period. To assist coordination of care and communication with nursing and staff, this note may be preliminary early in the day, but finalized by end of the day.                  Care Plan discussed with: Patient, Family, Nursing Staff, Consultant/Specialist, and >50% of time spent in counseling and coordination of care    Discussed:  Care Plan and D/C Planning    Prophylaxis:  SCD's and H2B/PPI    Disposition:  SNF/LTC and HH PT, OT, RN           ___________________________________________________    Attending Physician: Emerita Landrum MD

## 2022-09-04 NOTE — PROGRESS NOTES
Problem: Mobility Impaired (Adult and Pediatric)  Goal: *Acute Goals and Plan of Care (Insert Text)  Description: FUNCTIONAL STATUS PRIOR TO ADMISSION: Patient was independent and active without use of DME. However over last 2 weeks increasing LBP radiating into left glut and posterior left leg pain which became intractable. Difficulty walking. Came to ED by EMS. HOME SUPPORT PRIOR TO ADMISSION: The patient lived with wife but did not require assist prior to acute radicular left leg and hip pain (chronic LBP). Physical Therapy Goals  Initiated 9/3/2022    1. Patient will ambulate with modified independence for 100 feet with the least restrictive device within 4 days. 2. Patient will ascend/descend 5 stairs with 2 handrail(s) with modified independence within 4 days. 3. Patient will verbalize and demonstrate understanding of spinal precautions (No bending, lifting greater than 5 lbs, or twisting; log-roll technique; frequent repositioning as instructed) within 4 days. Outcome: Progressing Towards Goal  PHYSICAL THERAPY TREATMENT WITH DISCHARGE  Patient: Reyna Rosario (81 y.o. male)  Date: 9/4/2022  Diagnosis: Back pain [M54.9] Acute lumbar back pain      Precautions: Back, Fall No bending, no lifting greater than 5 lbs, no twisting, log-roll technique, repositioning every 20-30 min except when sleeping, brace when OOB (if ordered)  Chart, physical therapy assessment, plan of care and goals were reviewed. ASSESSMENT  Patient continues with skilled PT services and has progressed towards goals. Patient overall mod I/Indep level with functional mobility and asking about RW use as this device is new to him. Patient is received supine in bed dressed and ready for discharge. Patient and spouse asking about discharge procedures and HHPT. Patient able to demonstrate safe transfers and ambulation with RW with adjustments made to RW height to avoid forward flexion. Cues for RW management.   Patient able to walk with step to gait and foot flat however tentative with WB due to pain and needing support of UE's to offweight LE. Patient will benefit from HHPT upon discharge and has all equipment necessary. RN aware. Other factors to consider for discharge: Lives with spouse         PLAN :  Patient is cleared for discharge by physical therapy at this time. Rationale for discharge:  Goals achieved    Recommendation for discharge: (in order for the patient to meet his/her long term goals)  Physical therapy at least 2 days/week in the home     This discharge recommendation:  A follow-up discussion with the attending provider and/or case management is planned    IF patient discharges home will need the following DME: rolling walker       SUBJECTIVE:   [facility].     OBJECTIVE DATA SUMMARY:   Critical Behavior:  Neurologic State: Alert  Orientation Level: Oriented X4          Spinal diagnosis intervention:  The patient required no cues to maintain back precautions during functional activity. Functional Mobility Training:    Bed Mobility:  Log    Supine to Sit: Independent              Transfers:  Sit to Stand: Modified independent  Stand to Sit: Modified independent                             Balance:  Sitting: Intact  Ambulation/Gait Training:  Distance (ft): 10 Feet (ft)  Assistive Device: Gait belt  Ambulation - Level of Assistance: Supervision        Gait Abnormalities: Antalgic; Altered arm swing; Step to gait     Left Side Weight Bearing: As tolerated (Foot flat short step lengths)  Base of Support: Narrowed; Shift to right     Speed/Darlene: Slow                       Stairs:NT            Activity Tolerance:   Good      After treatment patient left in no apparent distress:   Supine in bed, Call bell within reach, and Caregiver / family present    COMMUNICATION/COLLABORATION:   The patients plan of care was discussed with: Registered nurse.      Damion Downey DPT

## 2022-09-04 NOTE — PROGRESS NOTES
Bedside and Verbal shift change report given to Alma Rosa (oncoming nurse) by Bethanie Martinez RN (offgoing nurse). Report included the following information SBAR, Kardex, Intake/Output, MAR, and Recent Results.

## 2022-09-06 ENCOUNTER — HOME HEALTH ADMISSION (OUTPATIENT)
Dept: HOME HEALTH SERVICES | Facility: HOME HEALTH | Age: 82
End: 2022-09-06

## 2024-04-07 ENCOUNTER — APPOINTMENT (OUTPATIENT)
Facility: HOSPITAL | Age: 84
DRG: 124 | End: 2024-04-07
Payer: MEDICARE

## 2024-04-07 ENCOUNTER — HOSPITAL ENCOUNTER (INPATIENT)
Facility: HOSPITAL | Age: 84
LOS: 3 days | Discharge: HOME | DRG: 124 | End: 2024-04-10
Attending: EMERGENCY MEDICINE | Admitting: INTERNAL MEDICINE
Payer: MEDICARE

## 2024-04-07 DIAGNOSIS — H54.62 DECREASED VISION OF LEFT EYE: ICD-10-CM

## 2024-04-07 DIAGNOSIS — H34.12 CENTRAL RETINAL ARTERY OCCLUSION OF LEFT EYE: Primary | ICD-10-CM

## 2024-04-07 PROBLEM — I63.9 ACUTE ISCHEMIC STROKE (HCC): Status: ACTIVE | Noted: 2024-04-07

## 2024-04-07 LAB
ALBUMIN SERPL-MCNC: 3.7 G/DL (ref 3.5–5)
ALBUMIN/GLOB SERPL: 1.2 (ref 1.1–2.2)
ALP SERPL-CCNC: 82 U/L (ref 45–117)
ALT SERPL-CCNC: 25 U/L (ref 12–78)
ANION GAP SERPL CALC-SCNC: 2 MMOL/L (ref 5–15)
AST SERPL-CCNC: 17 U/L (ref 15–37)
BASOPHILS # BLD: 0 K/UL (ref 0–0.1)
BASOPHILS NFR BLD: 0 % (ref 0–1)
BILIRUB SERPL-MCNC: 0.5 MG/DL (ref 0.2–1)
BUN SERPL-MCNC: 15 MG/DL (ref 6–20)
BUN/CREAT SERPL: 14 (ref 12–20)
CALCIUM SERPL-MCNC: 9.4 MG/DL (ref 8.5–10.1)
CHLORIDE SERPL-SCNC: 104 MMOL/L (ref 97–108)
CO2 SERPL-SCNC: 32 MMOL/L (ref 21–32)
CREAT SERPL-MCNC: 1.08 MG/DL (ref 0.7–1.3)
DIFFERENTIAL METHOD BLD: NORMAL
EOSINOPHIL # BLD: 0.1 K/UL (ref 0–0.4)
EOSINOPHIL NFR BLD: 2 % (ref 0–7)
ERYTHROCYTE [DISTWIDTH] IN BLOOD BY AUTOMATED COUNT: 13.7 % (ref 11.5–14.5)
GLOBULIN SER CALC-MCNC: 3 G/DL (ref 2–4)
GLUCOSE BLD STRIP.AUTO-MCNC: 146 MG/DL (ref 65–117)
GLUCOSE SERPL-MCNC: 156 MG/DL (ref 65–100)
HCT VFR BLD AUTO: 42.1 % (ref 36.6–50.3)
HGB BLD-MCNC: 14 G/DL (ref 12.1–17)
IMM GRANULOCYTES # BLD AUTO: 0 K/UL (ref 0–0.04)
IMM GRANULOCYTES NFR BLD AUTO: 0 % (ref 0–0.5)
INR PPP: 1 (ref 0.9–1.1)
LYMPHOCYTES # BLD: 1.5 K/UL (ref 0.8–3.5)
LYMPHOCYTES NFR BLD: 31 % (ref 12–49)
MCH RBC QN AUTO: 29.9 PG (ref 26–34)
MCHC RBC AUTO-ENTMCNC: 33.3 G/DL (ref 30–36.5)
MCV RBC AUTO: 89.8 FL (ref 80–99)
MONOCYTES # BLD: 0.5 K/UL (ref 0–1)
MONOCYTES NFR BLD: 9 % (ref 5–13)
NEUTS SEG # BLD: 2.8 K/UL (ref 1.8–8)
NEUTS SEG NFR BLD: 58 % (ref 32–75)
NRBC # BLD: 0 K/UL (ref 0–0.01)
NRBC BLD-RTO: 0 PER 100 WBC
PLATELET # BLD AUTO: 205 K/UL (ref 150–400)
PMV BLD AUTO: 9.7 FL (ref 8.9–12.9)
POTASSIUM SERPL-SCNC: 3.7 MMOL/L (ref 3.5–5.1)
PROT SERPL-MCNC: 6.7 G/DL (ref 6.4–8.2)
PROTHROMBIN TIME: 10.9 SEC (ref 9–11.1)
RBC # BLD AUTO: 4.69 M/UL (ref 4.1–5.7)
SERVICE CMNT-IMP: ABNORMAL
SODIUM SERPL-SCNC: 138 MMOL/L (ref 136–145)
TROPONIN I SERPL HS-MCNC: 9 NG/L (ref 0–76)
WBC # BLD AUTO: 4.9 K/UL (ref 4.1–11.1)

## 2024-04-07 PROCEDURE — 94761 N-INVAS EAR/PLS OXIMETRY MLT: CPT

## 2024-04-07 PROCEDURE — 6370000000 HC RX 637 (ALT 250 FOR IP): Performed by: INTERNAL MEDICINE

## 2024-04-07 PROCEDURE — 6360000002 HC RX W HCPCS

## 2024-04-07 PROCEDURE — 84484 ASSAY OF TROPONIN QUANT: CPT

## 2024-04-07 PROCEDURE — 6360000002 HC RX W HCPCS: Performed by: INTERNAL MEDICINE

## 2024-04-07 PROCEDURE — 85025 COMPLETE CBC W/AUTO DIFF WBC: CPT

## 2024-04-07 PROCEDURE — 82962 GLUCOSE BLOOD TEST: CPT

## 2024-04-07 PROCEDURE — 93005 ELECTROCARDIOGRAM TRACING: CPT | Performed by: EMERGENCY MEDICINE

## 2024-04-07 PROCEDURE — 2580000003 HC RX 258: Performed by: INTERNAL MEDICINE

## 2024-04-07 PROCEDURE — 2580000003 HC RX 258: Performed by: EMERGENCY MEDICINE

## 2024-04-07 PROCEDURE — 6370000000 HC RX 637 (ALT 250 FOR IP): Performed by: EMERGENCY MEDICINE

## 2024-04-07 PROCEDURE — 70450 CT HEAD/BRAIN W/O DYE: CPT

## 2024-04-07 PROCEDURE — 3E03317 INTRODUCTION OF OTHER THROMBOLYTIC INTO PERIPHERAL VEIN, PERCUTANEOUS APPROACH: ICD-10-PCS | Performed by: EMERGENCY MEDICINE

## 2024-04-07 PROCEDURE — 85610 PROTHROMBIN TIME: CPT

## 2024-04-07 PROCEDURE — 99285 EMERGENCY DEPT VISIT HI MDM: CPT

## 2024-04-07 PROCEDURE — 2500000003 HC RX 250 WO HCPCS: Performed by: EMERGENCY MEDICINE

## 2024-04-07 PROCEDURE — 80053 COMPREHEN METABOLIC PANEL: CPT

## 2024-04-07 PROCEDURE — 92977 HC THROMBOLYSIS/CORONARY: CPT

## 2024-04-07 PROCEDURE — 36415 COLL VENOUS BLD VENIPUNCTURE: CPT

## 2024-04-07 PROCEDURE — 2000000000 HC ICU R&B

## 2024-04-07 RX ORDER — ENOXAPARIN SODIUM 100 MG/ML
40 INJECTION SUBCUTANEOUS DAILY
Status: DISCONTINUED | OUTPATIENT
Start: 2024-04-08 | End: 2024-04-10 | Stop reason: HOSPADM

## 2024-04-07 RX ORDER — HYDRALAZINE HYDROCHLORIDE 20 MG/ML
10 INJECTION INTRAMUSCULAR; INTRAVENOUS EVERY 4 HOURS PRN
Status: DISCONTINUED | OUTPATIENT
Start: 2024-04-07 | End: 2024-04-07

## 2024-04-07 RX ORDER — SODIUM CHLORIDE 0.9 % (FLUSH) 0.9 %
5-40 SYRINGE (ML) INJECTION EVERY 12 HOURS SCHEDULED
Status: DISCONTINUED | OUTPATIENT
Start: 2024-04-07 | End: 2024-04-07 | Stop reason: SDUPTHER

## 2024-04-07 RX ORDER — TRANEXAMIC ACID 100 MG/ML
100 INJECTION, SOLUTION INTRAVENOUS ONCE
Status: COMPLETED | OUTPATIENT
Start: 2024-04-07 | End: 2024-04-07

## 2024-04-07 RX ORDER — TAMSULOSIN HYDROCHLORIDE 0.4 MG/1
0.4 CAPSULE ORAL DAILY
Status: DISCONTINUED | OUTPATIENT
Start: 2024-04-08 | End: 2024-04-10 | Stop reason: HOSPADM

## 2024-04-07 RX ORDER — SODIUM CHLORIDE 0.9 % (FLUSH) 0.9 %
10 SYRINGE (ML) INJECTION ONCE
Status: COMPLETED | OUTPATIENT
Start: 2024-04-07 | End: 2024-04-07

## 2024-04-07 RX ORDER — ONDANSETRON 4 MG/1
4 TABLET, ORALLY DISINTEGRATING ORAL EVERY 8 HOURS PRN
Status: DISCONTINUED | OUTPATIENT
Start: 2024-04-07 | End: 2024-04-10 | Stop reason: HOSPADM

## 2024-04-07 RX ORDER — ACETAMINOPHEN 500 MG
1000 TABLET ORAL EVERY 8 HOURS PRN
Status: DISCONTINUED | OUTPATIENT
Start: 2024-04-07 | End: 2024-04-10 | Stop reason: HOSPADM

## 2024-04-07 RX ORDER — SODIUM CHLORIDE 0.9 % (FLUSH) 0.9 %
5-40 SYRINGE (ML) INJECTION PRN
Status: DISCONTINUED | OUTPATIENT
Start: 2024-04-07 | End: 2024-04-10 | Stop reason: HOSPADM

## 2024-04-07 RX ORDER — HYDRALAZINE HYDROCHLORIDE 20 MG/ML
5 INJECTION INTRAMUSCULAR; INTRAVENOUS EVERY 4 HOURS PRN
Status: DISCONTINUED | OUTPATIENT
Start: 2024-04-07 | End: 2024-04-10 | Stop reason: HOSPADM

## 2024-04-07 RX ORDER — ATORVASTATIN CALCIUM 20 MG/1
80 TABLET, FILM COATED ORAL NIGHTLY
Status: DISCONTINUED | OUTPATIENT
Start: 2024-04-07 | End: 2024-04-09

## 2024-04-07 RX ORDER — SODIUM CHLORIDE 9 MG/ML
INJECTION, SOLUTION INTRAVENOUS PRN
Status: DISCONTINUED | OUTPATIENT
Start: 2024-04-07 | End: 2024-04-10 | Stop reason: HOSPADM

## 2024-04-07 RX ORDER — HYDRALAZINE HYDROCHLORIDE 20 MG/ML
10 INJECTION INTRAMUSCULAR; INTRAVENOUS EVERY 6 HOURS PRN
Status: DISCONTINUED | OUTPATIENT
Start: 2024-04-07 | End: 2024-04-07

## 2024-04-07 RX ORDER — TRAZODONE HYDROCHLORIDE 50 MG/1
100 TABLET ORAL NIGHTLY PRN
Status: DISCONTINUED | OUTPATIENT
Start: 2024-04-07 | End: 2024-04-08

## 2024-04-07 RX ORDER — HYDRALAZINE HYDROCHLORIDE 20 MG/ML
5 INJECTION INTRAMUSCULAR; INTRAVENOUS EVERY 4 HOURS PRN
Status: DISCONTINUED | OUTPATIENT
Start: 2024-04-07 | End: 2024-04-07

## 2024-04-07 RX ORDER — OXYMETAZOLINE HYDROCHLORIDE 0.05 G/100ML
2 SPRAY NASAL
Status: COMPLETED | OUTPATIENT
Start: 2024-04-07 | End: 2024-04-07

## 2024-04-07 RX ORDER — POLYETHYLENE GLYCOL 3350 17 G/17G
17 POWDER, FOR SOLUTION ORAL DAILY PRN
Status: DISCONTINUED | OUTPATIENT
Start: 2024-04-07 | End: 2024-04-10 | Stop reason: HOSPADM

## 2024-04-07 RX ORDER — PANTOPRAZOLE SODIUM 40 MG/1
40 TABLET, DELAYED RELEASE ORAL
Status: DISCONTINUED | OUTPATIENT
Start: 2024-04-08 | End: 2024-04-10 | Stop reason: HOSPADM

## 2024-04-07 RX ORDER — AMLODIPINE BESYLATE 5 MG/1
5 TABLET ORAL DAILY
Status: DISCONTINUED | OUTPATIENT
Start: 2024-04-07 | End: 2024-04-09

## 2024-04-07 RX ORDER — ASPIRIN 81 MG/1
81 TABLET, CHEWABLE ORAL DAILY
Status: DISCONTINUED | OUTPATIENT
Start: 2024-04-08 | End: 2024-04-10 | Stop reason: HOSPADM

## 2024-04-07 RX ORDER — LABETALOL HYDROCHLORIDE 5 MG/ML
5 INJECTION, SOLUTION INTRAVENOUS EVERY 4 HOURS PRN
Status: DISCONTINUED | OUTPATIENT
Start: 2024-04-07 | End: 2024-04-07

## 2024-04-07 RX ORDER — ASCORBIC ACID 500 MG
500 TABLET ORAL DAILY
Status: DISCONTINUED | OUTPATIENT
Start: 2024-04-08 | End: 2024-04-10 | Stop reason: HOSPADM

## 2024-04-07 RX ORDER — SODIUM CHLORIDE 0.9 % (FLUSH) 0.9 %
5-40 SYRINGE (ML) INJECTION EVERY 12 HOURS SCHEDULED
Status: DISCONTINUED | OUTPATIENT
Start: 2024-04-07 | End: 2024-04-10 | Stop reason: HOSPADM

## 2024-04-07 RX ORDER — HYDRALAZINE HYDROCHLORIDE 20 MG/ML
10 INJECTION INTRAMUSCULAR; INTRAVENOUS EVERY 4 HOURS PRN
Status: CANCELLED | OUTPATIENT
Start: 2024-04-07

## 2024-04-07 RX ORDER — ONDANSETRON 2 MG/ML
4 INJECTION INTRAMUSCULAR; INTRAVENOUS EVERY 6 HOURS PRN
Status: DISCONTINUED | OUTPATIENT
Start: 2024-04-07 | End: 2024-04-10 | Stop reason: HOSPADM

## 2024-04-07 RX ORDER — ASPIRIN 300 MG/1
300 SUPPOSITORY RECTAL DAILY
Status: DISCONTINUED | OUTPATIENT
Start: 2024-04-08 | End: 2024-04-09

## 2024-04-07 RX ORDER — LABETALOL HYDROCHLORIDE 5 MG/ML
5 INJECTION, SOLUTION INTRAVENOUS EVERY 4 HOURS PRN
Status: ACTIVE | OUTPATIENT
Start: 2024-04-07 | End: 2024-04-08

## 2024-04-07 RX ADMIN — TRANEXAMIC ACID 100 MG: 100 INJECTION, SOLUTION INTRAVENOUS at 14:51

## 2024-04-07 RX ADMIN — AMLODIPINE BESYLATE 5 MG: 5 TABLET ORAL at 14:11

## 2024-04-07 RX ADMIN — Medication 18 MG: at 12:39

## 2024-04-07 RX ADMIN — SODIUM CHLORIDE, PRESERVATIVE FREE 10 ML: 5 INJECTION INTRAVENOUS at 21:38

## 2024-04-07 RX ADMIN — SODIUM CHLORIDE, PRESERVATIVE FREE 10 ML: 5 INJECTION INTRAVENOUS at 12:39

## 2024-04-07 RX ADMIN — OXYMETAZOLINE HYDROCHLORIDE 2 SPRAY: 0.05 SPRAY, METERED NASAL at 14:43

## 2024-04-07 RX ADMIN — ATORVASTATIN CALCIUM 80 MG: 20 TABLET, FILM COATED ORAL at 21:37

## 2024-04-07 RX ADMIN — HYDRALAZINE HYDROCHLORIDE 10 MG: 20 INJECTION INTRAMUSCULAR; INTRAVENOUS at 14:11

## 2024-04-07 RX ADMIN — ACETAMINOPHEN 1000 MG: 500 TABLET ORAL at 18:22

## 2024-04-07 ASSESSMENT — PAIN DESCRIPTION - ORIENTATION
ORIENTATION: LEFT
ORIENTATION: ANTERIOR;LEFT
ORIENTATION: LEFT

## 2024-04-07 ASSESSMENT — PAIN DESCRIPTION - DESCRIPTORS
DESCRIPTORS: ACHING
DESCRIPTORS: PRESSURE
DESCRIPTORS: ACHING
DESCRIPTORS: PRESSURE

## 2024-04-07 ASSESSMENT — PAIN - FUNCTIONAL ASSESSMENT
PAIN_FUNCTIONAL_ASSESSMENT: ACTIVITIES ARE NOT PREVENTED
PAIN_FUNCTIONAL_ASSESSMENT: 0-10
PAIN_FUNCTIONAL_ASSESSMENT: ACTIVITIES ARE NOT PREVENTED

## 2024-04-07 ASSESSMENT — PAIN DESCRIPTION - PAIN TYPE
TYPE: ACUTE PAIN

## 2024-04-07 ASSESSMENT — PAIN SCALES - GENERAL
PAINLEVEL_OUTOF10: 6
PAINLEVEL_OUTOF10: 7
PAINLEVEL_OUTOF10: 3
PAINLEVEL_OUTOF10: 7
PAINLEVEL_OUTOF10: 8
PAINLEVEL_OUTOF10: 7
PAINLEVEL_OUTOF10: 8
PAINLEVEL_OUTOF10: 5
PAINLEVEL_OUTOF10: 6
PAINLEVEL_OUTOF10: 4

## 2024-04-07 ASSESSMENT — PAIN DESCRIPTION - LOCATION
LOCATION: EYE
LOCATION: EYE
LOCATION: HEAD;EYE
LOCATION: EYE
LOCATION: HEAD

## 2024-04-07 ASSESSMENT — PAIN DESCRIPTION - FREQUENCY
FREQUENCY: CONTINUOUS

## 2024-04-07 ASSESSMENT — PAIN DESCRIPTION - ONSET
ONSET: ON-GOING

## 2024-04-07 NOTE — ED NOTES
Stroke Education provided to patient and spouse/SO and the following topics were discussed    1. Patients personal risk factors for stroke are hyperlipidemia, hypertension, and smoking    2. Warning signs of Stroke:        * Sudden numbness or weakness of the face, arm or leg, especially on one side of          The body            * Sudden confusion, trouble speaking or understanding        * Sudden trouble seeing in one or both eyes        * Sudden trouble walking, dizziness, loss of balance or coordination        * Sudden severe headache with no known cause      3. Importance of activation Emergency Medical Services ( 9-1-1 ) immediately if experience any warning signs of stroke.    4. Be sure and schedule a follow-up appointment with your primary care doctor or any specialists as instructed.     5. You must take medicine every day to treat your risk factors for stroke.  Be sure to take your medicines exactly as your doctor tells you: no more, no less.  Know what your medicines are for , what they do.  Anti-thrombotics /anticoagulants can help prevent strokes.  You are taking the following medicine(s)  ASA, atrovastatin     6.  Smoking and second-hand smoke greatly increase your risk of stroke, cardiovascular disease and death. Smoking history pack years of use 15    7. Information provided was Stroke Handouts or Verbal Education    8. Documentation of teaching completed in Patient Education Activity and on Care Plan with teaching response noted?  yes

## 2024-04-07 NOTE — ED PROVIDER NOTES
EMERGENCY DEPARTMENT PHYSICIAN NOTE     Patient: Gilberto Noe Jr     Time of Service: 2024 12:03 PM     Chief complaint:   Chief Complaint   Patient presents with    Blurred Vision    Headache    Dizziness        HISTORY:  Patient is a 83 y.o. male who presents to the emergency department with complaints of left vision blurred.         Past Medical History:   Diagnosis Date    Anxiety     Arthritis     BPH (benign prostatic hyperplasia)     Chronic pain     Coronary artery disease     GERD (gastroesophageal reflux disease)     HTN (hypertension), benign     Rotator cuff tear     RIGHT         Past Surgical History:   Procedure Laterality Date    ADENOIDECTOMY      APPENDECTOMY      COLONOSCOPY      CORONARY ARTERY BYPASS GRAFT  1986    ORTHOPEDIC SURGERY Right 2021    Middle finger trigger release    POLYPECTOMY      TONSILLECTOMY      VASECTOMY          Family History   Problem Relation Age of Onset    Heart Disease Brother     Stroke Neg Hx     Anesth Problems Neg Hx     Cancer Mother         COLON    Cancer Father         BRAIN        Social History     Socioeconomic History    Marital status:    Tobacco Use    Smoking status: Former     Current packs/day: 0.00     Types: Cigarettes     Quit date: 1989     Years since quittin.2    Smokeless tobacco: Never   Substance and Sexual Activity    Alcohol use: Yes     Alcohol/week: 7.0 standard drinks of alcohol    Drug use: No     Social Determinants of Health     Food Insecurity: No Food Insecurity (2024)    Hunger Vital Sign     Worried About Running Out of Food in the Last Year: Never true     Ran Out of Food in the Last Year: Never true   Transportation Needs: No Transportation Needs (2024)    PRAPARE - Transportation     Lack of Transportation (Medical): No     Lack of Transportation (Non-Medical): No   Housing Stability: Low Risk  (2024)    Housing Stability Vital Sign     Unable to Pay for Housing

## 2024-04-07 NOTE — CONSULTS
Name: Gilberto Noe Jr MRN: [id number]   : 1940 [facility]: [facility] [place]   Date: 2024        Impression Plan   CVA s/p TNK  epistaxis  HA/Blurred vision -> left facial droop                Reduced vision left eye c/w RINCON  Prior TIA  at time of colonoscopy  IV Contrast allergy (anaphylaxis)  HTN  CAD s/p CABG  Hx , GERD, BPH, spinal cord stimulator implant                BP GOAL < 140  Asa and statin  Q1 hr neurochecks  F/u 24 hr post-TNK head CT  F/u stat brain MRI  F/u echocardiogram            Radiology  ( personally reviewed)    ABG Invalid input(s): \"PHI\", \"PO2I\", \"PCO2I\"       Subjective     83 M retired  Pmhx TIA, HTN, ASHD prior CABG, GERD, presented to ER with abrupt onset of left eye blurred vision and headache. He was evaluated by neurology and received TNK. Post TNK her experienced left facial droop. Subsequent head CT without ICH. Persistent left eye vision loss - awaiting stat MRI (IV contrast anaphylaxis)    Past Medical History:   Diagnosis Date    Anxiety     Arthritis     BPH (benign prostatic hyperplasia)     Chronic pain     Coronary artery disease     GERD (gastroesophageal reflux disease)     HTN (hypertension), benign     Rotator cuff tear     RIGHT       Past Surgical History:   Procedure Laterality Date    ADENOIDECTOMY      APPENDECTOMY      COLONOSCOPY      CORONARY ARTERY BYPASS GRAFT  1986    ORTHOPEDIC SURGERY Right 2021    Middle finger trigger release    POLYPECTOMY      TONSILLECTOMY      VASECTOMY        Prior to Admission medications    Medication Sig Start Date End Date Taking? Authorizing Provider   ALPRAZolam (XANAX) 0.5 MG tablet Take by mouth. 20   Automatic Reconciliation, Ar   ascorbic acid (VITAMIN C) 1000 MG tablet Take by mouth daily    Automatic Reconciliation, Ar   aspirin 81 MG chewable tablet Take by mouth daily    Automatic Reconciliation, Ar   atorvastatin (LIPITOR) 40 MG tablet Take by

## 2024-04-07 NOTE — ED NOTES
14:45 vitals and neuro assessment delayed due to having to retake pt's BP multiple times due to movement of pt's arms

## 2024-04-07 NOTE — ED TRIAGE NOTES
Signs and symptoms: blurred vision, dizziness   Code Stroke activation time: 1206  Provider at bedside time:  1202  VAN score: Negative  Last Known Well (Time): 1030  Blood Glucose Result/Time: 146   Blood Pressure: 155/84  Anticoagulants (List medications): aspirin

## 2024-04-07 NOTE — ED TRIAGE NOTES
Signs and symptoms: blurred vision, dizziness   Code Stroke activation time: 1206  Provider at bedside time:  1202  VAN score: Negative  Last Known Well (Time): 1030  Blood Glucose Result/Time: 146   Blood Pressure: 155/84  Anticoagulants (List medications): aspirin        Patient arrives to the ED with complaints of blurred vision and eye pain that started about 1030

## 2024-04-07 NOTE — H&P
History and Physical    Date of Service:  4/7/2024  Primary Care Provider: Noel Esparza MD  Source of information: Patient, Family, External Medical Records    Chief Complaint: Blurred Vision, Headache, and Dizziness      History of Presenting Illness:   Gilberto Noe Jr is a 83 y.o. male with a past medical history of anxiety, BPH, CAD, GERD, HTN, prior EF for syncope/TIA, who presents to the emergency room due to headache with blurry vision.    Patient came in with new onset headache and blurry vision and a code stroke was called.  He had no other acute neurologic deficits at that time.  There was concern for retinal artery occlusion and TNK was given on the direction of teleneurology.  Post receiving TNK patient developed left-sided facial droop and a stat CT was obtained which was negative for bleed.  Teleneurology was consulted again and believes this is a stroke.  Internal medicine was consulted for admission.    At this time patient has improving facial droop, improving blurry vision, and has no other complaints.     REVIEW OF SYSTEMS:  A comprehensive review of systems was negative except for that written in the History of Present Illness.     Past Medical History:   Diagnosis Date    Anxiety     Arthritis     BPH (benign prostatic hyperplasia)     Chronic pain     Coronary artery disease 1988    GERD (gastroesophageal reflux disease)     HTN (hypertension), benign     Rotator cuff tear     RIGHT       Past Surgical History:   Procedure Laterality Date    ADENOIDECTOMY  1955    APPENDECTOMY  1955    COLONOSCOPY      CORONARY ARTERY BYPASS GRAFT  1986    ORTHOPEDIC SURGERY Right 09/17/2021    Middle finger trigger release    POLYPECTOMY      TONSILLECTOMY  1955    VASECTOMY  1990     Prior to Admission medications    Medication Sig Start Date End Date Taking? Authorizing Provider   ALPRAZolam (XANAX) 0.5 MG tablet Take by mouth. 4/7/20   Automatic Reconciliation, Ar   ascorbic acid (VITAMIN

## 2024-04-07 NOTE — ED NOTES
Patient having new onset L facial droop, Dr. Saleem notified and at bedside.    No previous uterine incision/Garcia Pregnancy/Less than or equal to 4 previous vaginal births/No known bleeding disorder

## 2024-04-08 ENCOUNTER — APPOINTMENT (OUTPATIENT)
Facility: HOSPITAL | Age: 84
DRG: 124 | End: 2024-04-08
Payer: MEDICARE

## 2024-04-08 ENCOUNTER — APPOINTMENT (OUTPATIENT)
Dept: VASCULAR SURGERY | Facility: HOSPITAL | Age: 84
DRG: 124 | End: 2024-04-08
Payer: MEDICARE

## 2024-04-08 LAB
CHOLEST SERPL-MCNC: 106 MG/DL
EKG ATRIAL RATE: 60 BPM
EKG DIAGNOSIS: NORMAL
EKG P-R INTERVAL: 212 MS
EKG Q-T INTERVAL: 408 MS
EKG QRS DURATION: 98 MS
EKG QTC CALCULATION (BAZETT): 408 MS
EKG R AXIS: 10 DEGREES
EKG T AXIS: 39 DEGREES
EKG VENTRICULAR RATE: 60 BPM
ERYTHROCYTE [DISTWIDTH] IN BLOOD BY AUTOMATED COUNT: 14 % (ref 11.5–14.5)
EST. AVERAGE GLUCOSE BLD GHB EST-MCNC: 117 MG/DL
HBA1C MFR BLD: 5.7 % (ref 4–5.6)
HCT VFR BLD AUTO: 41.2 % (ref 36.6–50.3)
HDLC SERPL-MCNC: 43 MG/DL
HDLC SERPL: 2.5 (ref 0–5)
HGB BLD-MCNC: 13.5 G/DL (ref 12.1–17)
LDLC SERPL CALC-MCNC: 47.8 MG/DL (ref 0–100)
MCH RBC QN AUTO: 29 PG (ref 26–34)
MCHC RBC AUTO-ENTMCNC: 32.8 G/DL (ref 30–36.5)
MCV RBC AUTO: 88.6 FL (ref 80–99)
NRBC # BLD: 0 K/UL (ref 0–0.01)
NRBC BLD-RTO: 0 PER 100 WBC
PLATELET # BLD AUTO: 193 K/UL (ref 150–400)
PMV BLD AUTO: 9.4 FL (ref 8.9–12.9)
RBC # BLD AUTO: 4.65 M/UL (ref 4.1–5.7)
TRIGL SERPL-MCNC: 76 MG/DL
VLDLC SERPL CALC-MCNC: 15.2 MG/DL
WBC # BLD AUTO: 6.7 K/UL (ref 4.1–11.1)

## 2024-04-08 PROCEDURE — 97116 GAIT TRAINING THERAPY: CPT

## 2024-04-08 PROCEDURE — 2580000003 HC RX 258: Performed by: INTERNAL MEDICINE

## 2024-04-08 PROCEDURE — 6370000000 HC RX 637 (ALT 250 FOR IP): Performed by: INTERNAL MEDICINE

## 2024-04-08 PROCEDURE — 2060000000 HC ICU INTERMEDIATE R&B

## 2024-04-08 PROCEDURE — 70450 CT HEAD/BRAIN W/O DYE: CPT

## 2024-04-08 PROCEDURE — 97161 PT EVAL LOW COMPLEX 20 MIN: CPT

## 2024-04-08 PROCEDURE — 36415 COLL VENOUS BLD VENIPUNCTURE: CPT

## 2024-04-08 PROCEDURE — 93010 ELECTROCARDIOGRAM REPORT: CPT | Performed by: INTERNAL MEDICINE

## 2024-04-08 PROCEDURE — 80061 LIPID PANEL: CPT

## 2024-04-08 PROCEDURE — 94761 N-INVAS EAR/PLS OXIMETRY MLT: CPT

## 2024-04-08 PROCEDURE — 83036 HEMOGLOBIN GLYCOSYLATED A1C: CPT

## 2024-04-08 PROCEDURE — 6360000002 HC RX W HCPCS: Performed by: INTERNAL MEDICINE

## 2024-04-08 PROCEDURE — 97165 OT EVAL LOW COMPLEX 30 MIN: CPT

## 2024-04-08 PROCEDURE — 93880 EXTRACRANIAL BILAT STUDY: CPT

## 2024-04-08 PROCEDURE — 2580000003 HC RX 258: Performed by: NURSE PRACTITIONER

## 2024-04-08 PROCEDURE — 99222 1ST HOSP IP/OBS MODERATE 55: CPT | Performed by: NURSE PRACTITIONER

## 2024-04-08 PROCEDURE — 97535 SELF CARE MNGMENT TRAINING: CPT

## 2024-04-08 PROCEDURE — 85027 COMPLETE CBC AUTOMATED: CPT

## 2024-04-08 PROCEDURE — 6370000000 HC RX 637 (ALT 250 FOR IP): Performed by: NURSE PRACTITIONER

## 2024-04-08 PROCEDURE — 92610 EVALUATE SWALLOWING FUNCTION: CPT

## 2024-04-08 RX ORDER — CLOPIDOGREL BISULFATE 75 MG/1
75 TABLET ORAL DAILY
Status: DISCONTINUED | OUTPATIENT
Start: 2024-04-08 | End: 2024-04-10 | Stop reason: HOSPADM

## 2024-04-08 RX ORDER — GABAPENTIN 100 MG/1
100 CAPSULE ORAL 3 TIMES DAILY
Status: DISCONTINUED | OUTPATIENT
Start: 2024-04-08 | End: 2024-04-10 | Stop reason: HOSPADM

## 2024-04-08 RX ORDER — 0.9 % SODIUM CHLORIDE 0.9 %
250 INTRAVENOUS SOLUTION INTRAVENOUS ONCE
Status: COMPLETED | OUTPATIENT
Start: 2024-04-08 | End: 2024-04-08

## 2024-04-08 RX ADMIN — TRAZODONE HYDROCHLORIDE 100 MG: 50 TABLET ORAL at 01:16

## 2024-04-08 RX ADMIN — PANTOPRAZOLE SODIUM 40 MG: 40 TABLET, DELAYED RELEASE ORAL at 08:11

## 2024-04-08 RX ADMIN — ASPIRIN 81 MG: 81 TABLET, CHEWABLE ORAL at 16:34

## 2024-04-08 RX ADMIN — TAMSULOSIN HYDROCHLORIDE 0.4 MG: 0.4 CAPSULE ORAL at 08:11

## 2024-04-08 RX ADMIN — AMLODIPINE BESYLATE 5 MG: 5 TABLET ORAL at 08:11

## 2024-04-08 RX ADMIN — ACETAMINOPHEN 1000 MG: 500 TABLET ORAL at 01:14

## 2024-04-08 RX ADMIN — SODIUM CHLORIDE 250 ML: 9 INJECTION, SOLUTION INTRAVENOUS at 02:28

## 2024-04-08 RX ADMIN — ENOXAPARIN SODIUM 40 MG: 100 INJECTION SUBCUTANEOUS at 16:34

## 2024-04-08 RX ADMIN — ATORVASTATIN CALCIUM 80 MG: 20 TABLET, FILM COATED ORAL at 21:39

## 2024-04-08 RX ADMIN — OXYCODONE HYDROCHLORIDE AND ACETAMINOPHEN 500 MG: 500 TABLET ORAL at 08:11

## 2024-04-08 RX ADMIN — GABAPENTIN 100 MG: 100 CAPSULE ORAL at 16:38

## 2024-04-08 RX ADMIN — GABAPENTIN 100 MG: 100 CAPSULE ORAL at 21:38

## 2024-04-08 RX ADMIN — CLOPIDOGREL BISULFATE 75 MG: 75 TABLET ORAL at 16:34

## 2024-04-08 RX ADMIN — SODIUM CHLORIDE, PRESERVATIVE FREE 10 ML: 5 INJECTION INTRAVENOUS at 08:11

## 2024-04-08 RX ADMIN — ACETAMINOPHEN 1000 MG: 500 TABLET ORAL at 11:00

## 2024-04-08 RX ADMIN — SODIUM CHLORIDE, PRESERVATIVE FREE 10 ML: 5 INJECTION INTRAVENOUS at 21:39

## 2024-04-08 ASSESSMENT — PAIN - FUNCTIONAL ASSESSMENT
PAIN_FUNCTIONAL_ASSESSMENT: ACTIVITIES ARE NOT PREVENTED

## 2024-04-08 ASSESSMENT — PAIN DESCRIPTION - ONSET
ONSET: ON-GOING

## 2024-04-08 ASSESSMENT — PAIN DESCRIPTION - LOCATION
LOCATION: EYE

## 2024-04-08 ASSESSMENT — PAIN DESCRIPTION - ORIENTATION
ORIENTATION: LEFT

## 2024-04-08 ASSESSMENT — PAIN DESCRIPTION - DESCRIPTORS
DESCRIPTORS: PRESSURE
DESCRIPTORS: PRESSURE
DESCRIPTORS: ACHING
DESCRIPTORS: PRESSURE
DESCRIPTORS: ACHING
DESCRIPTORS: PRESSURE
DESCRIPTORS: PRESSURE

## 2024-04-08 ASSESSMENT — PAIN DESCRIPTION - FREQUENCY
FREQUENCY: CONTINUOUS

## 2024-04-08 ASSESSMENT — PAIN SCALES - GENERAL
PAINLEVEL_OUTOF10: 6
PAINLEVEL_OUTOF10: 6
PAINLEVEL_OUTOF10: 3
PAINLEVEL_OUTOF10: 6
PAINLEVEL_OUTOF10: 6
PAINLEVEL_OUTOF10: 4
PAINLEVEL_OUTOF10: 6
PAINLEVEL_OUTOF10: 6
PAINLEVEL_OUTOF10: 4
PAINLEVEL_OUTOF10: 5
PAINLEVEL_OUTOF10: 4
PAINLEVEL_OUTOF10: 6
PAINLEVEL_OUTOF10: 5

## 2024-04-08 ASSESSMENT — PAIN DESCRIPTION - PAIN TYPE
TYPE: ACUTE PAIN

## 2024-04-08 NOTE — CARE COORDINATION
Pt has a low readmission risk score of 12%   A complete initial case management assessment will not be completed.      Date/Time of Evaluation: 4/8/2024 1:29 PM  Assessment Completed by: TIGRE GONZALEZ RN    If patient is discharged prior to next notation, then this note serves as note for discharge by case management.    Patient Name: Gilberto Noe Jr                   YOB: 1940  Diagnosis: Acute ischemic stroke (HCC) [I63.9]  Central retinal artery occlusion of left eye [H34.12]  Decreased vision of left eye [H54.62]                   Date / Time: 4/7/2024 12:03 PM    Patient Admission Status: Inpatient   Readmission Risk (Low < 19, Mod (19-27), High > 27): Readmission Risk Score: 11.5    Current PCP: Noel Esparza MD     Hospitalization in the last 30 days (Readmission):  no    Advance Directives:      Code Status: Full Code   Patient's Primary Decision Maker is: Legal Next of Kin wife,Apurva Noe @ [phone]      Discharge Planning:    Patient lives with: Spouse/Significant Other Type of Home: House  Primary Care Giver:  wife  Patient Support Systems include: Children, Spouse/Significant Other   Current Financial resources:  medicare   Current community resources:  none   Current services prior to admission: None           Financial    Payor: MEDICARE / Plan: MEDICARE PART A AND B / Product Type: *No Product type* /     Does insurance require precert for SNF: no    Potential assistance Purchasing Medications: no   Meds-to-Beds request:  no    Additional Case Management Notes:   Once PT/OT/speech have had an opportunity to evaluate pt,I will meet with pt and his wife to discuss discharge needs.  Of note;  Against his daughter-in-law's advice who is a neurology NP,pt drove himself to the hospital.  Pt complained of dizziness and left eye blurriness and then complete blindness in his left eye.Pt still has blurriness of his left eye.  Pt has a history of 2 other CVAs(ICH 2000,CVA

## 2024-04-08 NOTE — PLAN OF CARE
Problem: Pain  Goal: Verbalizes/displays adequate comfort level or baseline comfort level  Outcome: Progressing     Problem: Safety - Adult  Goal: Free from fall injury  Outcome: Progressing     Problem: Neurosensory - Adult  Goal: Achieves stable or improved neurological status  Outcome: Progressing     Problem: Skin/Tissue Integrity - Adult  Goal: Skin integrity remains intact  Outcome: Progressing  Goal: Oral mucous membranes remain intact  Outcome: Progressing     Problem: Musculoskeletal - Adult  Goal: Return mobility to safest level of function  Outcome: Progressing     Problem: SLP Adult - Impaired Swallowing  Goal: By Discharge: Advance to least restrictive diet without signs or symptoms of aspiration for planned discharge setting.  See evaluation for individualized goals.  4/8/2024 0914 by Elida Diaz, SLP  Outcome: Progressing

## 2024-04-08 NOTE — PLAN OF CARE
Speech LAnguage Pathology EVALUATION    Patient: Gilberto Noe Jr (83 y.o. male)  Date: 4/8/2024  Primary Diagnosis: Acute ischemic stroke (HCC) [I63.9]  Central retinal artery occlusion of left eye [H34.12]  Decreased vision of left eye [H54.62]       Precautions: aspiration                    ASSESSMENT :  Patient with mild pharyngeal dysphagia that may be premorbid per wife's report. HE did well with smaller straw bore. No dysarthria or aphasia.   Admitted 4-7-24 with L facial droop, blurred vision, dizziness. Possible retinal artery occlusion. He received TNK. Head CT: negative. MRI: 2020:  microvascular changes.    PMH: GERD,  _A&D, BPH, HTN< reduced ef,  CAD s/p CABG, spinal cord stimulator, syncope/TIA s/p colonscopy 2020.     Patient will benefit from skilled intervention to address the above impairments.     PLAN :  Recommendations and Planned Interventions:  Diet: Regular and thin liquids  Use small straw  Upright for all PO  Will follow up x1 for any questions with wife         Acute SLP Services: Yes, patient will be followed by speech-language pathology 2x/week to address goals. Patient's rehabilitation potential is considered to be Good.    Discharge Recommendations: No, additional SLP treatment not indicated at discharge     SUBJECTIVE:   Patient stated, “Sometimes I get choked on water.”    OBJECTIVE:     Past Medical History:   Diagnosis Date    Anxiety     Arthritis     BPH (benign prostatic hyperplasia)     Chronic pain     Coronary artery disease 1988    GERD (gastroesophageal reflux disease)     HTN (hypertension), benign     Rotator cuff tear     RIGHT      Past Surgical History:   Procedure Laterality Date    ADENOIDECTOMY  1955    APPENDECTOMY  1955    COLONOSCOPY      CORONARY ARTERY BYPASS GRAFT  1986    ORTHOPEDIC SURGERY Right 09/17/2021    Middle finger trigger release    POLYPECTOMY      TONSILLECTOMY  1955    VASECTOMY  1990     Prior Level of Function/Home Situation:

## 2024-04-08 NOTE — CONSULTS
[facility]    Hortensia Wilson, MSHA, CNRN, ACNP-BC  [facility]  [address] [place]  [phone]        Name:   Gilberto Noe Jr   Medical record #: [id number]  Admission Date: 4/7/2024     Who Consulted: Dr. Packer    Reason for Consult:  Stroke with thrombolysis    HISTORY OF PRESENT ILLNESS:     This is a 83 y.o. male who is admitted for blurred vision, headache, and dizziness.  Mr. Noe presented to the ED on 4/7/2024 with an approximately 2-hour history of blurred vision and dizziness, his presenting blood pressure was 155/84 and he was given TNK for possible retinal artery occlusion.  Post TNK he developed left-sided facial droop and a stat CT was obtained which was negative for bleed, he later developed headache and left eye pain and another CT was done which again showed no acute process.  Review of admission labs shows a presenting glucose of 146, normal CMP, no evidence of transaminitis, normal CBC and urine.  In discussion with patient's primary team provider she tells me that post TNK, Mr. Noe had waxing and waning neurologic symptoms that were unaffected by his blood pressure.    In discussion with  and Mrs. Noe they tell me that Mr. Noe woke up yesterday morning and felt unwell and decided not to go to [facility] with his wife.  Then while walking his dog he developed dizziness, left eye blurriness and called his daughter-in-law who is a neurology NP.  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  He tells me that she advised him to call 911 to bring him for to the hospital for a stroke, but he decided to drive himself.  Mr. Noe reports a significant ICH of unknown location in the year 2000 and an 2020 he had another stroke for which he was given tPA.  Attempted to call Mr. Noe's daughter-in-law, Anu Noe at [phone] however there was no answer.  The Neurology Service is asked to evaluate

## 2024-04-08 NOTE — SIGNIFICANT EVENT
Called with concern with pts vision. Pt seen at bedside, alert and awake, denies any new complaints. In discussion with pt and his vision in his left eye, concern for possible changes. Per pt and documents his vision in his left eye was basically diminished with only sight to directly in face. Pt now reports he is able to see me as shadows at the foot of his bed, he relates this is a significant improvement. Will continue to monitor for acute changes.

## 2024-04-08 NOTE — PLAN OF CARE
Problem: Discharge Planning  Goal: Discharge to home or other facility with appropriate resources  Outcome: Progressing     Problem: Pain  Goal: Verbalizes/displays adequate comfort level or baseline comfort level  Outcome: Progressing     Problem: Safety - Adult  Goal: Free from fall injury  Outcome: Progressing     Problem: ABCDS Injury Assessment  Goal: Absence of physical injury  Outcome: Progressing     Problem: Skin/Tissue Integrity  Goal: Absence of new skin breakdown  Description: 1.  Monitor for areas of redness and/or skin breakdown  2.  Assess vascular access sites hourly  3.  Every 4-6 hours minimum:  Change oxygen saturation probe site  4.  Every 4-6 hours:  If on nasal continuous positive airway pressure, respiratory therapy assess nares and determine need for appliance change or resting period.  Outcome: Progressing     Problem: Neurosensory - Adult  Goal: Achieves stable or improved neurological status  Outcome: Progressing  Goal: Achieves maximal functionality and self care  Outcome: Progressing     Problem: Skin/Tissue Integrity - Adult  Goal: Skin integrity remains intact  Outcome: Progressing  Flowsheets (Taken 4/7/2024 1930)  Skin Integrity Remains Intact:   Monitor for areas of redness and/or skin breakdown   Assess vascular access sites hourly  Goal: Oral mucous membranes remain intact  Outcome: Progressing     Problem: Musculoskeletal - Adult  Goal: Return mobility to safest level of function  Outcome: Progressing  Goal: Maintain proper alignment of affected body part  Outcome: Progressing  Goal: Return ADL status to a safe level of function  Outcome: Progressing

## 2024-04-09 ENCOUNTER — APPOINTMENT (OUTPATIENT)
Facility: HOSPITAL | Age: 84
DRG: 124 | End: 2024-04-09
Payer: MEDICARE

## 2024-04-09 LAB
ECHO BSA: 1.86 M2
ERYTHROCYTE [SEDIMENTATION RATE] IN BLOOD: 5 MM/HR (ref 0–20)
VAS LEFT CCA DIST EDV: 15.2 CM/S
VAS LEFT CCA DIST PSV: 95.4 CM/S
VAS LEFT CCA PROX EDV: 25.6 CM/S
VAS LEFT CCA PROX PSV: 136.7 CM/S
VAS LEFT ECA EDV: 0 CM/S
VAS LEFT ECA PSV: 113.5 CM/S
VAS LEFT ICA DIST EDV: 23 CM/S
VAS LEFT ICA DIST PSV: 100.5 CM/S
VAS LEFT ICA MID EDV: 23 CM/S
VAS LEFT ICA MID PSV: 95.4 CM/S
VAS LEFT ICA PROX EDV: 12.7 CM/S
VAS LEFT ICA PROX PSV: 56.6 CM/S
VAS LEFT ICA/CCA PSV: 1.1 NO UNITS
VAS LEFT SUBCLAVIAN PROX EDV: 0 CM/S
VAS LEFT SUBCLAVIAN PROX PSV: 206.7 CM/S
VAS LEFT VERTEBRAL EDV: 0 CM/S
VAS LEFT VERTEBRAL PSV: 66.9 CM/S
VAS RIGHT CCA DIST EDV: 20.4 CM/S
VAS RIGHT CCA DIST PSV: 90.3 CM/S
VAS RIGHT CCA PROX EDV: 12.7 CM/S
VAS RIGHT CCA PROX PSV: 144.8 CM/S
VAS RIGHT ECA EDV: 7.5 CM/S
VAS RIGHT ECA PSV: 116.2 CM/S
VAS RIGHT ICA DIST EDV: 15 CM/S
VAS RIGHT ICA DIST PSV: 70 CM/S
VAS RIGHT ICA MID EDV: 17.9 CM/S
VAS RIGHT ICA MID PSV: 72.2 CM/S
VAS RIGHT ICA PROX EDV: 17.9 CM/S
VAS RIGHT ICA PROX PSV: 67 CM/S
VAS RIGHT ICA/CCA PSV: 0.8 NO UNITS
VAS RIGHT SUBCLAVIAN PROX EDV: 0 CM/S
VAS RIGHT SUBCLAVIAN PROX PSV: 106.4 CM/S
VAS RIGHT VERTEBRAL EDV: 0 CM/S
VAS RIGHT VERTEBRAL PSV: 59.2 CM/S

## 2024-04-09 PROCEDURE — 6360000002 HC RX W HCPCS: Performed by: NURSE PRACTITIONER

## 2024-04-09 PROCEDURE — 6370000000 HC RX 637 (ALT 250 FOR IP): Performed by: INTERNAL MEDICINE

## 2024-04-09 PROCEDURE — 99233 SBSQ HOSP IP/OBS HIGH 50: CPT | Performed by: NURSE PRACTITIONER

## 2024-04-09 PROCEDURE — 4A03X5D MEASUREMENT OF ARTERIAL FLOW, INTRACRANIAL, EXTERNAL APPROACH: ICD-10-PCS | Performed by: RADIOLOGY

## 2024-04-09 PROCEDURE — 36415 COLL VENOUS BLD VENIPUNCTURE: CPT

## 2024-04-09 PROCEDURE — 70498 CT ANGIOGRAPHY NECK: CPT

## 2024-04-09 PROCEDURE — 2060000000 HC ICU INTERMEDIATE R&B

## 2024-04-09 PROCEDURE — 94761 N-INVAS EAR/PLS OXIMETRY MLT: CPT

## 2024-04-09 PROCEDURE — 92526 ORAL FUNCTION THERAPY: CPT

## 2024-04-09 PROCEDURE — 6370000000 HC RX 637 (ALT 250 FOR IP): Performed by: NURSE PRACTITIONER

## 2024-04-09 PROCEDURE — 85652 RBC SED RATE AUTOMATED: CPT

## 2024-04-09 PROCEDURE — 6360000004 HC RX CONTRAST MEDICATION: Performed by: NURSE PRACTITIONER

## 2024-04-09 PROCEDURE — 2580000003 HC RX 258: Performed by: INTERNAL MEDICINE

## 2024-04-09 PROCEDURE — 6360000002 HC RX W HCPCS: Performed by: INTERNAL MEDICINE

## 2024-04-09 RX ORDER — AMLODIPINE BESYLATE 5 MG/1
2.5 TABLET ORAL DAILY
Status: DISCONTINUED | OUTPATIENT
Start: 2024-04-09 | End: 2024-04-10 | Stop reason: HOSPADM

## 2024-04-09 RX ORDER — DIPHENHYDRAMINE HCL 25 MG
50 CAPSULE ORAL ONCE
Status: COMPLETED | OUTPATIENT
Start: 2024-04-09 | End: 2024-04-09

## 2024-04-09 RX ORDER — ATORVASTATIN CALCIUM 20 MG/1
40 TABLET, FILM COATED ORAL NIGHTLY
Status: DISCONTINUED | OUTPATIENT
Start: 2024-04-09 | End: 2024-04-10 | Stop reason: HOSPADM

## 2024-04-09 RX ADMIN — OXYCODONE HYDROCHLORIDE AND ACETAMINOPHEN 500 MG: 500 TABLET ORAL at 07:50

## 2024-04-09 RX ADMIN — GABAPENTIN 100 MG: 100 CAPSULE ORAL at 21:38

## 2024-04-09 RX ADMIN — DIPHENHYDRAMINE HYDROCHLORIDE 50 MG: 25 CAPSULE ORAL at 15:58

## 2024-04-09 RX ADMIN — SODIUM CHLORIDE, PRESERVATIVE FREE 10 ML: 5 INJECTION INTRAVENOUS at 09:00

## 2024-04-09 RX ADMIN — CLOPIDOGREL BISULFATE 75 MG: 75 TABLET ORAL at 07:51

## 2024-04-09 RX ADMIN — ATORVASTATIN CALCIUM 40 MG: 20 TABLET, FILM COATED ORAL at 21:38

## 2024-04-09 RX ADMIN — GABAPENTIN 100 MG: 100 CAPSULE ORAL at 14:45

## 2024-04-09 RX ADMIN — AMLODIPINE BESYLATE 2.5 MG: 5 TABLET ORAL at 07:51

## 2024-04-09 RX ADMIN — TAMSULOSIN HYDROCHLORIDE 0.4 MG: 0.4 CAPSULE ORAL at 07:51

## 2024-04-09 RX ADMIN — GABAPENTIN 100 MG: 100 CAPSULE ORAL at 08:24

## 2024-04-09 RX ADMIN — HYDROCORTISONE SODIUM SUCCINATE 200 MG: 100 INJECTION, POWDER, FOR SOLUTION INTRAMUSCULAR; INTRAVENOUS at 15:58

## 2024-04-09 RX ADMIN — PANTOPRAZOLE SODIUM 40 MG: 40 TABLET, DELAYED RELEASE ORAL at 07:51

## 2024-04-09 RX ADMIN — IOPAMIDOL 100 ML: 755 INJECTION, SOLUTION INTRAVENOUS at 17:59

## 2024-04-09 RX ADMIN — ASPIRIN 81 MG: 81 TABLET, CHEWABLE ORAL at 07:50

## 2024-04-09 RX ADMIN — HYDROCORTISONE SODIUM SUCCINATE 200 MG: 100 INJECTION, POWDER, FOR SOLUTION INTRAMUSCULAR; INTRAVENOUS at 11:56

## 2024-04-09 RX ADMIN — ENOXAPARIN SODIUM 40 MG: 100 INJECTION SUBCUTANEOUS at 08:22

## 2024-04-09 ASSESSMENT — PAIN - FUNCTIONAL ASSESSMENT
PAIN_FUNCTIONAL_ASSESSMENT: ACTIVITIES ARE NOT PREVENTED

## 2024-04-09 ASSESSMENT — PAIN DESCRIPTION - ONSET
ONSET: ON-GOING

## 2024-04-09 ASSESSMENT — PAIN DESCRIPTION - ORIENTATION
ORIENTATION: LEFT

## 2024-04-09 ASSESSMENT — PAIN DESCRIPTION - DESCRIPTORS
DESCRIPTORS: PRESSURE

## 2024-04-09 ASSESSMENT — PAIN DESCRIPTION - PAIN TYPE
TYPE: ACUTE PAIN

## 2024-04-09 ASSESSMENT — PAIN DESCRIPTION - LOCATION
LOCATION: EYE

## 2024-04-09 ASSESSMENT — PAIN SCALES - GENERAL
PAINLEVEL_OUTOF10: 3
PAINLEVEL_OUTOF10: 0
PAINLEVEL_OUTOF10: 5
PAINLEVEL_OUTOF10: 0

## 2024-04-09 ASSESSMENT — PAIN DESCRIPTION - FREQUENCY
FREQUENCY: CONTINUOUS

## 2024-04-09 NOTE — DISCHARGE INSTRUCTIONS
Physician's or R.N.'s Signature                                                                  Date/Time                                                                                                                                              Patient or Representative Signature                                                          Date/Time

## 2024-04-09 NOTE — PLAN OF CARE
Speech LAnguage Pathology TREATMENT/DISCHARGE    Patient: Gilberto Noe Jr (83 y.o. male)  Date: 4/9/2024  Primary Diagnosis: Acute ischemic stroke (HCC) [I63.9]  Central retinal artery occlusion of left eye [H34.12]  Decreased vision of left eye [H54.62]       Precautions: aspiration                    ASSESSMENT :  Patient with functional swallowing. Wife reports that he has intermittent coughing at home on thin liquids, even before CVA. No reported PNA.   Currently has mild aspiration risks due to new CVA.    Patient will be discharged from skilled speech-language pathology services at this time.     PLAN :  Recommendations and Planned Interventions:  Diet: Regular and thin liquids  Upright for all PO  Use small bore straw; small sips         Acute SLP Services: No, patient will be discharged from acute skilled speech-language pathology at this time.    Discharge Recommendations: No, additional SLP treatment not indicated at discharge     SUBJECTIVE:   Patient stated, “He coughs on liquids, then he takes another sip and coughs again..”    OBJECTIVE:     Past Medical History:   Diagnosis Date    Anxiety     Arthritis     BPH (benign prostatic hyperplasia)     Chronic pain     Coronary artery disease 1988    GERD (gastroesophageal reflux disease)     HTN (hypertension), benign     Rotator cuff tear     RIGHT      Past Surgical History:   Procedure Laterality Date    ADENOIDECTOMY  1955    APPENDECTOMY  1955    COLONOSCOPY      CORONARY ARTERY BYPASS GRAFT  1986    ORTHOPEDIC SURGERY Right 09/17/2021    Middle finger trigger release    POLYPECTOMY      TONSILLECTOMY  1955    VASECTOMY  1990     Prior Level of Function/Home Situation:   Social/Functional History  Lives With: Spouse  Type of Home: House  Home Layout: One level  Home Access: Stairs to enter with rails  Entrance Stairs - Number of Steps: 6  Entrance Stairs - Rails: Both  Bathroom Shower/Tub: Walk-in shower  Bathroom Toilet: Standard  Bathroom

## 2024-04-09 NOTE — CARE COORDINATION
Transition of Care Plan:    RUR: 12%  Prior Level of Functioning: lives @ home with wife Apurva,pt actually drove himself to the hospital so will pt have any driving restrictions ?    Disposition: return home with wife       For discharge:    There are no identified skilled PT needs identified.  There are no skilled skilled OT needs identified.  There are no skilled speech therapy needs identified.    If needs change,please consult case management with specific needs.    Wife-Apurva Noe is primary decision-maker @ [phone]    Camille Jansen RN

## 2024-04-09 NOTE — PLAN OF CARE
Problem: Pain  Goal: Verbalizes/displays adequate comfort level or baseline comfort level  Outcome: Progressing     Problem: Safety - Adult  Goal: Free from fall injury  Outcome: Progressing     Problem: Neurosensory - Adult  Goal: Achieves stable or improved neurological status  Outcome: Progressing     Problem: Skin/Tissue Integrity - Adult  Goal: Skin integrity remains intact  Outcome: Progressing  Flowsheets (Taken 4/9/2024 1146 by Yue Willis RN)  Skin Integrity Remains Intact:   Monitor for areas of redness and/or skin breakdown   Assess vascular access sites hourly     Problem: Musculoskeletal - Adult  Goal: Return mobility to safest level of function  Outcome: Progressing

## 2024-04-10 ENCOUNTER — APPOINTMENT (OUTPATIENT)
Facility: HOSPITAL | Age: 84
DRG: 124 | End: 2024-04-10
Payer: MEDICARE

## 2024-04-10 ENCOUNTER — TELEPHONE (OUTPATIENT)
Facility: HOSPITAL | Age: 84
End: 2024-04-10

## 2024-04-10 ENCOUNTER — HOSPITAL ENCOUNTER (INPATIENT)
Facility: HOSPITAL | Age: 84
Discharge: HOME | DRG: 124 | End: 2024-04-13
Payer: MEDICARE

## 2024-04-10 VITALS
RESPIRATION RATE: 15 BRPM | HEART RATE: 63 BPM | WEIGHT: 161.7 LBS | BODY MASS INDEX: 25.38 KG/M2 | TEMPERATURE: 97.9 F | OXYGEN SATURATION: 99 % | HEIGHT: 67 IN | DIASTOLIC BLOOD PRESSURE: 76 MMHG | SYSTOLIC BLOOD PRESSURE: 135 MMHG

## 2024-04-10 PROCEDURE — 2580000003 HC RX 258: Performed by: INTERNAL MEDICINE

## 2024-04-10 PROCEDURE — 99232 SBSQ HOSP IP/OBS MODERATE 35: CPT | Performed by: NURSE PRACTITIONER

## 2024-04-10 PROCEDURE — 70551 MRI BRAIN STEM W/O DYE: CPT

## 2024-04-10 PROCEDURE — 6370000000 HC RX 637 (ALT 250 FOR IP): Performed by: INTERNAL MEDICINE

## 2024-04-10 PROCEDURE — 70544 MR ANGIOGRAPHY HEAD W/O DYE: CPT

## 2024-04-10 PROCEDURE — 6370000000 HC RX 637 (ALT 250 FOR IP): Performed by: NURSE PRACTITIONER

## 2024-04-10 PROCEDURE — 6360000002 HC RX W HCPCS: Performed by: INTERNAL MEDICINE

## 2024-04-10 RX ORDER — ALPRAZOLAM 0.5 MG/1
0.5 TABLET ORAL 2 TIMES DAILY
Status: DISCONTINUED | OUTPATIENT
Start: 2024-04-10 | End: 2024-04-10 | Stop reason: HOSPADM

## 2024-04-10 RX ADMIN — OXYCODONE HYDROCHLORIDE AND ACETAMINOPHEN 500 MG: 500 TABLET ORAL at 08:17

## 2024-04-10 RX ADMIN — ACETAMINOPHEN 1000 MG: 500 TABLET ORAL at 08:17

## 2024-04-10 RX ADMIN — ALPRAZOLAM 0.5 MG: 0.5 TABLET ORAL at 08:17

## 2024-04-10 RX ADMIN — GABAPENTIN 100 MG: 100 CAPSULE ORAL at 08:17

## 2024-04-10 RX ADMIN — CLOPIDOGREL BISULFATE 75 MG: 75 TABLET ORAL at 08:18

## 2024-04-10 RX ADMIN — GABAPENTIN 100 MG: 100 CAPSULE ORAL at 14:04

## 2024-04-10 RX ADMIN — AMLODIPINE BESYLATE 2.5 MG: 5 TABLET ORAL at 08:18

## 2024-04-10 RX ADMIN — ENOXAPARIN SODIUM 40 MG: 100 INJECTION SUBCUTANEOUS at 08:18

## 2024-04-10 RX ADMIN — TAMSULOSIN HYDROCHLORIDE 0.4 MG: 0.4 CAPSULE ORAL at 08:17

## 2024-04-10 RX ADMIN — ASPIRIN 81 MG: 81 TABLET, CHEWABLE ORAL at 08:18

## 2024-04-10 RX ADMIN — PANTOPRAZOLE SODIUM 40 MG: 40 TABLET, DELAYED RELEASE ORAL at 06:45

## 2024-04-10 RX ADMIN — SODIUM CHLORIDE, PRESERVATIVE FREE 10 ML: 5 INJECTION INTRAVENOUS at 08:19

## 2024-04-10 ASSESSMENT — PAIN SCALES - GENERAL: PAINLEVEL_OUTOF10: 0

## 2024-04-10 NOTE — DISCHARGE SUMMARY
Physician Discharge Summary     Patient ID:  Gilberto Noe Jr  [id number]  83 y.o.  1940    Admit date: 4/7/2024    Discharge date and time: 4/10/2024    Admission Diagnoses: Acute ischemic stroke (HCC) [I63.9]  Central retinal artery occlusion of left eye [H34.12]  Decreased vision of left eye [H54.62]    Discharge Diagnoses/Hospital Course   Mr. Noe is a 84 yo male with PMH of CVA, CAD, HTN, BPH, GERD admitted for vision loss. He was called as a CODE stroke and underwent administration of TNK per teleneurology recommendation. Symptoms did not improve. CTA head/neck without LVO or acute process. MRI/MRA without acute process. ESR WNL. No temporal tenderness or jaw claudication. Pt did have IOP that was reported as 15.16. [facility] called and will be seen on 4/11 and possibly earlier if deemed necessary by his retinal specialist.     PCP: Noel Esparza MD     Consults: neurology    Discharge Exam:  Vitals:    04/10/24 0322 04/10/24 0701 04/10/24 0720 04/10/24 1127   BP: 113/63  (!) 145/65 135/63   Pulse: 65 60 59 60   Resp: 18  16 16   Temp: 98 °F (36.7 °C)  97.9 °F (36.6 °C) 97.5 °F (36.4 °C)   TempSrc: Oral  Oral Axillary   SpO2: 97%  98% 97%   Weight:       Height:          Gen:  Well-developed, well-nourished, in no acute distress  HEENT:  Pink conjunctivae, PERRL, hearing intact to voice, moist mucous membranes  Neck:  Supple, without masses, thyroid non-tender  Resp:  No accessory muscle use, clear breath sounds without wheezes rales or rhonchi  Card:  No murmurs, normal S1, S2 without thrills, bruits or peripheral edema  Abd:  Soft, non-tender, non-distended, normoactive bowel sounds are present  Musc:  No cyanosis or clubbing  Skin:  No rashes or ulcers, skin turgor is good  Neuro: L sided mild weakness. L foot drop. L visual disturbance. No temporal artery tenderness   Psych:  Good insight, oriented to person, place and time, alert    Disposition: home    Patient Instructions:

## 2024-04-10 NOTE — TELEPHONE ENCOUNTER
Pt needs a hospital follow up appointment  Provider: Any available  Location: [facility]  In person or virtual: In person  When: 8 to 12 weeks  Diagnosis/reason for follow up: Blurred vision in left eye with thrombolytics

## 2024-04-11 NOTE — PROGRESS NOTES
Critical Care Progress Note  Lindsay Murphy MD          Date of Service:  2024  NAME:  Gilberto Noe Jr  :  1940  MRN:  [id number]      Subjective/Hospital course:   HPI:  83 M retired  Pmhx TIA, HTN, ASHD prior CABG, GERD, presented to ER with abrupt onset of left eye blurred vision and headache. He was evaluated by neurology and received TNK. Post TNK her experienced left facial droop. Subsequent head CT without ICH. Persistent left eye vision loss - awaiting stat MRI (IV contrast anaphylaxis)     Interval history:  : No acute events. Still with some LUE weakness. L eye vision loss improving somewhat.        Problem list:     Patient Active Problem List   Diagnosis    Generalized weakness    Hyperlipidemia    Anxiety    HTN (hypertension), benign    BPH (benign prostatic hyperplasia)    Arthritis    Chronic pain    Coronary artery disease    Acute lumbar back pain    Back pain    Acute ischemic stroke (HCC)           Assessment/Plan:   Assessment:  CVA s/p TNK  epistaxis  HA/Blurred vision -> left facial droop                Reduced vision left eye c/w RINCON  Prior TIA  at time of colonoscopy  IV Contrast allergy (anaphylaxis)  HTN  CAD s/p CABG  Hx , GERD, BPH, spinal cord stimulator implant     Plan:  BP GOAL < 140  Asa and statin  Neurochecks  F/u 24 hr post-TNK head CT  MRI  Neuro consulted  F/u echocardiogram  PT/OT, speech    Code status: Full  DVT prophylaxis: Lovenox if follow-up CT not concerning  GI prophylaxis: Protonix      Review of Systems:   Decreased L vision (improving), LUE weakness, otherwise negative         Vital Signs:   Patient Vitals for the past 4 hrs:   BP Temp Temp src Pulse Resp SpO2   24 0730 124/72 98 °F (36.7 °C) Oral 60 14 97 %   24 0700 130/64 -- -- 60 13 96 %   24 0630 134/62 -- -- 60 15 --   24 0615 129/68 -- -- 61 11 --   24 0600 129/68 -- -- 60 14 96 %   24 0545 139/74 -- -- 92 23 --   24 
  [facility] SECOURS: [facility]    Hortensia Wilson, MSHA, CNRN, ACNP-BC  [facility] Neurology  [address]  [phone]        Name:   Gilberto Noe Jr   Medical record #: [id number]  Admission Date: 4/7/2024   Reason for Consult:  Stroke with thrombolysis    Subjective/Objective:   Overnight events:  No acute events noted overnight, still complaining of blurred vision and floaters in his left eye.  Bedside nurse reports that they have been able to obtain pacemaker clearance from his cardiologist but have not been able to schedule the pacemaker rep to come to do the MRI.                Care Plan discussed with:  Patient x   Family x   RN    Care Manager    Primary Team Provider    Consultant/Specialist        Impression/ Plan:      Blurred vision in left eye, r/o Stroke:    Continue aspirin 81 mg, will start Plavix 75 mg  Neurochecks: Every 4 hours  BP Goal: Less than 140  While MRI/MRA would be ideal to evaluate for stroke or retinal occlusion, given the delay will get a CTA today after patient receives the desensitization to contrast protocol.  Patient states that he has had contrasted studies over the last few years but is always given Benadryl and Solu-Medrol first as iodine causes a cough.    Stroke work up  A1C: 5.7  LDL: 47.8, continue current regimen  TTE: EF 45-50  MRI: Pending pacemaker clearance  Carotid vascular imaging: Carotid duplex pending     Risk factors for stroke include: History of ischemic and hemorrhagic stroke, CAD, HLD  Discussed with patient    Discussed signs/symptoms of stroke and when to call 911    Thank you for allowing the Neurology Service the pleasure of participating in the care of your patient.       Physical Exam    Patient Vitals for the past 12 hrs:   Temp Pulse Resp BP SpO2   04/09/24 0600 -- 60 14 113/62 95 %   04/09/24 0400 97.8 °F (36.6 °C) 60 15 132/73 97 %   04/09/24 0200 -- 60 15 122/71 97 %   04/09/24 0000 97.9 °F (36.6 °C) 60 18 114/67 
 attended rounds in ICU as part of interdisciplinary team where patient's ongoing care was discussed. Please contact Spiritual Care for further referrals.    Rev Johanne Mtz MDiv, BCC  To mario alberto peralta: [phone] ([phone])  
1420 TRANSFER - IN REPORT:    Verbal report received from ROB Obrien  on Gilberto Noe Jr  being received from ED for routine progression of patient care      Report consisted of patient's Situation, Background, Assessment and   Recommendations(SBAR).     Information from the following report(s) Nurse Handoff Report, ED SBAR, Adult Overview, and Cardiac Rhythm A paced  was reviewed with the receiving nurse.    Opportunity for questions and clarification was provided.      Assessment completed upon patient's arrival to unit and care assumed.     1515- Pt arrived on unit, connected to continuous monitoring.     1530- Dual Neuro assessment completed with this RN and Camille RUBIN RN. NIH 4.   Pt is alert and oriented x 4. Pt states that vision in L eye is still \"spiderwebs\" but the \"floater and spots are gone.\"  unequal, L weaker than right, baseline per patient. BUE tremors, new since receiving TNK per patient, ataxia in BUE. Mild dysarthria.  Pt states 5/10 pain in anterior head and L eye. Pt denies chest pain/SOB/nausea/numbness/ tingling at this time. Pt endorses intermittent pain/tingling in 4/5th digits in Right foot- has been waiting to have MD appointment with Dr. Durham to address.     1600- Q30 min Neuro exam completed, see flow sheet.     1618- Dr. Packer updated on change in patient condition.     1630- Dr. Packer present at bedside assessing patient, improvement in patient NIH score, dysarthia and ataxia improved.     1643- Dr. Packer updated on patient and wife statement of intermittent dysphagia with thin liquids, pt passed bedside swallow exam. Per MD, pt OK for PO and will have speech consult assess patient as well.     1700- Q30 min Neuro exam completed, see flow sheet.     1730-Q30 min Neuro exam completed, see flow sheet.     1740- Dr. Packer updated on worsening headache and no current PRNs in MAR. Anticipate new orders. As well as pt cough with bloody clot up from sputum.     1800-Q30 min 
2041 = pt complained of pressure pain on his left eye. Blurry but as soon as he cover his right eye, pt could see a vision (whoever is in front of him). Neuro perfect served. Hortensia Wilson informed. No significant changes since admission with his vision. Asked to refer to hospitalist.  2049 = Referred to Dr. Mark Anthony Small. Waiting for the response  2110 = confer with Hortensia Wilson NP and Shameka Briones NP. No neuro changes noted  2125 = seen and examined by Shameka Briones. No significant changes noted since admission. Will continue to monitor.  2200 = Vs and neuro status stable. Feeling uncomfortable. Repositioned several times. Anxious  0000 = VS and neuro assessment stable. Fidgety and anxious. Unable to get any sleep. Added 4 pillows underneath his head per pt's request. Complained of feeling cold. Warm blanket given.  0114 = still with left eye pain. Tylenol given as ordered per pt's request. Unable to get any sleep. Trazadone po given per pt's request.   0200 = BP low. Pt. Does not feel good. Repositioned. Feels better after 5 minutes  0215 = BP still low. Shameka Briones NP notified with order noted and carried out.  0230 = pt feels better. VS stable. No significant neuro changes noted.  0400 = VS and neuro stable. Feels a lot better  Stroke Education provided to patient and the following topics were discussed    1. Patients personal risk factors for stroke are hyperlipidemia and hypertension    2. Warning signs of Stroke:        * Sudden numbness or weakness of the face, arm or leg, especially on one side of          The body            * Sudden confusion, trouble speaking or understanding        * Sudden trouble seeing in one or both eyes        * Sudden trouble walking, dizziness, loss of balance or coordination        * Sudden severe headache with no known cause      3. Importance of activation Emergency Medical Services ( 9-1-1 ) immediately if experience any warning signs of stroke.    4. Be sure 
Bedside and Verbal shift change report given to Robbie Hammond RN (oncoming nurse) by ROB Epperson (offgoing nurse). Report included the following information Nurse Handoff Report, Adult Overview, Intake/Output, MAR, Recent Results, Cardiac Rhythm Apaced, Alarm Parameters, and Neuro Assessment.     0700: Patient without complaints. Resting in bed. Instructed patient to call for assistance before getting out of bed. Call light in reach.  1105: Patient c/o 6/10 left eye pain. Tylenol 650 mg given. Wife at bedside.   1640: Patient c/o 6/10 left eye pain. Patient stated Tylenol did not work. Dr. Packer informed.     Bedside and Verbal shift change report given to ROB Epperson (oncoming nurse) by Robbie Hammond RN (offgoing nurse). Report included the following information Nurse Handoff Report, Adult Overview, Intake/Output, MAR, Recent Results, Cardiac Rhythm Apaced, Alarm Parameters, and Neuro Assessment.     
Bedside and Verbal shift change report given to Robbie Hammond RN (oncoming nurse) by ROB Ribeiro (offgoing nurse). Report included the following information Nurse Handoff Report, Adult Overview, Intake/Output, MAR, Recent Results, Cardiac Rhythm A-paced, Alarm Parameters, and Neuro Assessment.     0700: Patient resting in bed. Instructed patient to call for assistance before getting out of bed.   Call light in reach.     TRANSFER - OUT REPORT:    Verbal report given to ROB Turner on Gilberto Noe Jr  being transferred to [facility] for routine progression of patient care       Report consisted of patient's Situation, Background, Assessment and   Recommendations(SBAR).     Information from the following report(s) Nurse Handoff Report, Adult Overview, Intake/Output, MAR, Recent Results, Cardiac Rhythm Apaced, Alarm Parameters, and Neuro Assessment was reviewed with the receiving nurse.           Lines:   Peripheral IV 04/07/24 (Active)       Peripheral IV 04/07/24 Right Antecubital (Active)   Site Assessment Clean, dry & intact 04/09/24 1151   Line Status Capped;Flushed;Normal saline locked 04/09/24 1151   Line Care Connections checked and tightened 04/09/24 1151   Phlebitis Assessment No symptoms 04/09/24 1151   Infiltration Assessment 0 04/09/24 1151   Alcohol Cap Used Yes 04/09/24 1151   Dressing Status Clean, dry & intact 04/09/24 1151   Dressing Type Transparent 04/09/24 1151        Opportunity for questions and clarification was provided.      Patient transported with:  Monitor and Laine Turner  
Delayed entry:   On 4/9 called [facility] for appt for patient and pt scheduled for 4/11 AM in event that MRI/MRA without e/o CVA or retinal artery occlusion     4/10: MRI/MRA as follows:   MRI demonstrates no acute brain infarct or intracranial hemorrhage.  Mild to moderate chronic microangiopathic white matter changes.  MRA demonstrates no intracranial aneurysm, large vessel occlusion or significant  luminal narrowing.    Immediately called [facility] to eval for STAT appt if felt necessary. Apparently pt given option of going to [place] for retina specialist or keep ophthalmology appt as scheduled on 4/9 and previously scheduled appt kept by patient.     Called pt this AM to query on outcome of ophthalmology appt and apparently pt is being sent to a retina specialist currently for eval for \"bleeding behind the eye\". Will follow up with pt in the AM.       
PHYSICAL THERAPY EVALUATION/DISCHARGE    Patient: Gilberto Noe Jr (83 y.o. male)  Date: 4/8/2024  Primary Diagnosis: Acute ischemic stroke (HCC) [I63.9]  Central retinal artery occlusion of left eye [H34.12]  Decreased vision of left eye [H54.62]       Precautions:                        ASSESSMENT AND RECOMMENDATIONS:  Based on the objective data below, the patient supervision with ambulation without assistive device and independent with all functional mobility. Has history CVA 4-5 years ago with residual mild left hemiparesis.  Patient report some left eye visual impairment gave eye patch stated feels better when left eye covered. Educate and instructed to use eye patch when watching TV and to remove it once in a while not more than an hour at a time, verbalized understanding. Patient ambulate without assistive device in the ICU hallway no loss of balance.  Reviewed all safety precaution and home exercise program with the patient, verbalized understanding. Skilled physical therapy is not indicated at this time.     Functional Outcome Measure:  The patient scored 18/24 on the [facility] outcome measure ..    Further skilled acute physical therapy is not indicated at this time.  Patient is cleared for discharge from PT standpoint:  YES [x]     NO []       PLAN :  Recommendation for discharge: (in order for the patient to meet his/her long term goals): No skilled physical therapy    Other factors to consider for discharge: no additional factors    IF patient discharges home will need the following DME: none       SUBJECTIVE:   Patient stated “My left eye blurred”    OBJECTIVE DATA SUMMARY:     Past Medical History:   Diagnosis Date    Anxiety     Arthritis     BPH (benign prostatic hyperplasia)     Chronic pain     Coronary artery disease 1988    GERD (gastroesophageal reflux disease)     HTN (hypertension), benign     Rotator cuff tear     RIGHT      Past Surgical History:   Procedure Laterality Date    ADENOIDECTOMY 
Spiritual Care Assessment/Progress Note  [facility]    Name: Gilberto Noe Jr MRN: [id number]    Age: 83 y.o.     Sex: male   Language: English     Date: 4/8/2024            Total Time Calculated: 9 min              Spiritual Assessment begun in [facility] A4 INTENSIVE CARE UNIT  Service Provided For:: Patient  Referral/Consult From:: Rounding  Encounter Overview/Reason : Initial Encounter    Spiritual beliefs:      [x] Involved in a dio tradition/spiritual practice: [email or web address]     [x] Supported by a dio community:      [] Claims no spiritual orientation:      [] Seeking spiritual identity:           [] Adheres to an individual form of spirituality:      [] Not able to assess:                Identified resources for coping and support system:   Support System: Family members       [x] Prayer                  [] Devotional reading               [] Music                  [] Guided Imagery     [] Pet visits                                        [] Other: (COMMENT)     Specific area/focus of visit   Encounter:    Crisis:    Spiritual/Emotional needs: Type: Spiritual Support, Other (comment) (emotional support)  Ritual, Rites and Sacraments:    Grief, Loss, and Adjustments: Type: Adjustment to illness  Ethics/Mediation:    Behavioral Health:    Palliative Care:    Advance Care Planning:      Plan/Referrals: No future visits requested    Narrative:   Visited Mr Noe in room A466 for initial spiritual assessment. Reviewed patient's chart prior to visit. Mr Noe was lying quietly in bed and appeared in pretty good spirits.    Provided spiritual presence and active listening as patient shared about his current health issue. Stated that he was not worried about it because he had strong dio that God would take care of him. Acknowledged his feelings and offered words of support.    Mr Noe shared that he and his wife were members of [email or web address] and that his dio community was aware 
[facility]ELMA [facility]  [address] [place] [address] [phone]        Hospitalist Progress Note      NAME: Gilberto Noe Jr   :  1940  MRM:  [id number]    Date/Time: 2024  4:05 PM         Subjective:     Chief Complaint: \"I'm okay\"     Pt seen and examined. Intermittently with lightheadedness. Also feels that he has some cognitive slowing. L eye with persistent vision changes     ROS:  (bold if positive, if negative)           Objective:       Vitals:          Last 24hrs VS reviewed since prior progress note. Most recent are:    Vitals:    24 1512   BP: 131/74   Pulse: 65   Resp: 15   Temp: 98.6 °F (37 °C)   SpO2: 96%     SpO2 Readings from Last 6 Encounters:   24 96%          Intake/Output Summary (Last 24 hours) at 2024 1605  Last data filed at 2024 0600  Gross per 24 hour   Intake 110 ml   Output --   Net 110 ml          Exam:     Physical Exam:    Gen:  Well-developed, well-nourished, in no acute distress  HEENT:  Pink conjunctivae, PERRL, hearing intact to voice, moist mucous membranes  Neck:  Supple, without masses, thyroid non-tender  Resp:  No accessory muscle use, clear breath sounds without wheezes rales or rhonchi  Card:  No murmurs, normal S1, S2 without thrills, bruits or peripheral edema  Abd:  Soft, non-tender, non-distended, normoactive bowel sounds are present  Musc:  No cyanosis or clubbing  Skin:  No rashes or ulcers, skin turgor is good  Neuro: L sided mild weakness. L foot drop. L visual disturbance. No temporal artery tenderness   Psych:  Good insight, oriented to person, place and time, alert       Medications Reviewed: (see below)    Lab Data Reviewed: (see below)    ______________________________________________________________________    Medications:     Current Facility-Administered Medications   Medication Dose Route Frequency    atorvastatin (LIPITOR) tablet 40 mg  40 mg Oral Nightly    amLODIPine (NORVASC) tablet 
           NEUROLOGICAL EXAM:       General:  Alert, cooperative, no acute distress  Mental Status: Oriented to time, place and person. Fully attentive. No aphasia. Full fund of knowledge. Normal recent and remote memory.      Cranial Nerves:   Visual Fields:  normal in all quadrants in right eye, lateral field cut in left eye.  Continued blurred vision in left eye, normal vision in right eye. EOM: no nystagmus. Facial movements:  symmetric, no ptosis Facial sensation:  intact to LT on both sides. Hearing:  normal       Language:  no dysarthria, no aphasia, normal fluency, normal repetition. Tongue: midline. Soft palate: not examined  SCMs: normal, symmetric.          Reflexes:   LUExt: 2+/ 4                 RUExt: 2+/ 4  LLExt: 2+/4                  RLExt: 2+/ 4          Sensory:   LT and Temp intact in all extremities            Cerebellar:  No resting, no postural tremors, normal finger nose finger.  No pronator drift                            Motor:           LUExt: 4+/ 5               RUExt: 5/ 5                                              LLExt: 4+/ 5                RLExt: 5/ 5        Gait:   Not tested     Portions of this note were completed with Dragon, the [employer] voice recognition software.  Quite often unanticipated grammatical, syntax, homophones, and other interpretive errors are inadvertently transcribed by the computer software.  Please disregard these errors.  Efforts were made to edit the dictations but occasionally words are mis-transcribed.   
  Patient left in no apparent distress sitting up in chair and Call bell within reach    COMMUNICATION/EDUCATION:   The patient's plan of care was discussed with: physical therapist and registered nurse         Thank you for this referral.  Donovan Javier OT  Minutes: 26    Occupational Therapy Evaluation Charge Determination   History Examination Decision-Making   LOW Complexity : Brief history review  MEDIUM Complexity: 3-5 Performance deficits relating to physical, cognitive, or psychosocial skills that result in activity limitations and/or participation restrictions MEDIUM Complexity: Patient may present with comorbidities that affect occupational performance. Minimal to moderate modifications of tasks or assist (eg. physical or verbal) with assist is necessary to enable pt to complete eval   Based on the above components, the patient evaluation is determined to be of the following complexity level: Low    
this patient and independently examined them on 4/8/2024 as outlined below:          General : alert x 3, awake, no acute distress,   HEENT: PEERL, EOMI, moist mucus membrane, TM clear  Neck: supple, no JVD, no meningeal signs  Chest: Clear to auscultation bilaterally   CVS: S1 S2 heard, Capillary refill less than 2 seconds  Abd: soft/ non tender, non distended, BS physiological,   Ext: no clubbing, no cyanosis, no edema, brisk 2+ DP pulses  Neuro/Psych: pleasant mood and affect, CN 2-12 grossly intact with exception of decreased vision in left eye, sensory grossly within normal limit, Moves all extremities, difficulty with rapid alternating movements of right hand, weakness noted of left arm  Skin: warm            Data Review:    Review and/or order of clinical lab test  Review and/or order of tests in the radiology section of CPT  Review and/or order of tests in the medicine section of CPT  Discussion of test results with performing physician    I have independently reviewed and interpreted patient's lab and all other diagnostic data    Notes reviewed from all clinical/nonclinical/nursing services involved in patient's clinical care. Care coordination discussions were held with appropriate clinical/nonclinical/ nursing providers based on care coordination needs.     Radiology: Radiology Reads Reviewed    Labs:     Recent Labs     04/07/24  1244 04/08/24  0440   WBC 4.9 6.7   HGB 14.0 13.5   HCT 42.1 41.2    193     Recent Labs     04/07/24  1244      K 3.7      CO2 32   BUN 15     Recent Labs     04/07/24  1244   ALT 25   GLOB 3.0     Recent Labs     04/07/24  1320   INR 1.0      No results for input(s): \"TIBC\", \"FERR\" in the last 72 hours.    Invalid input(s): \"FE\", \"PSAT\"   No results found for: \"FOL\", \"RBCF\"   No results for input(s): \"PH\", \"PCO2\", \"PO2\" in the last 72 hours.  No results for input(s): \"CPK\" in the last 72 hours.    Invalid input(s): \"CPKMB\", \"CKNDX\", \"TROIQ\"  Lab Results

## 2024-04-11 NOTE — TELEPHONE ENCOUNTER
Spoke with the patient's wife and she doesn't know if he needs to see neuro after seeing ophthalmology and them referring him to a retina specialist. I sent a message to Hortensia Wilson asking her opinion and will call the patient back once I receive a response.

## 2024-04-16 NOTE — TELEPHONE ENCOUNTER
Spoke with Apurva and let her know Hortensia said the patient didn't need to follow up with Neuro.

## 2024-05-24 RX ORDER — GABAPENTIN 100 MG/1
100 CAPSULE ORAL 3 TIMES DAILY
COMMUNITY

## 2024-05-24 RX ORDER — SODIUM CHLORIDE 1 G/1
1 TABLET ORAL DAILY
COMMUNITY

## 2024-05-24 NOTE — PERIOP NOTE
[facility]                   [address] [place] [address]   MAIN OR                                  [phone]   MAIN PRE OP                          [phone]                                                                                AMBULATORY PRE OP          [phone]  PRE-ADMISSION TESTING    [phone]     Surgery Date:  May 31 (Friday)      Is surgery arrival time given by surgeon?    NO  If “NO”, [place] staff will call you between 3 and 7pm the day before your surgery with your arrival time. (If your surgery is on a Monday, we will call you the Friday before.)    Call [phone] after 7pm Monday-Friday if you did not receive this call.    INSTRUCTIONS BEFORE YOUR SURGERY   When You  Arrive Arrive at the 2nd Floor Admitting Desk on the day of your surgery  Have your insurance card, photo ID, and any copayment (if needed)   Food   and   Drink NO food or drink after midnight the night before surgery    This means NO water, gum, mints, coffee, juice, etc.  No alcohol (beer, wine, liquor) 24 hours before and after surgery   Medications to   TAKE   Morning of Surgery MEDICATIONS TO TAKE THE MORNING OF SURGERY WITH A SIP OF WATER:     Alprazolam  Atorvastatin  Gabapentin    Do not take 72 hours before surgery - Cialis     Medications  To  STOP      7 days before surgery Non-Steroidal anti-inflammatory Drugs (NSAID's): for example, Ibuprofen (Advil, Motrin), Naproxen (Aleve)  Aspirin, if taking for pain   Herbal supplements, vitamins, and fish oil  Other:  (Pain medications not listed above, including Tylenol may be taken)   Blood  Thinners Aspirin - Contact prescribing physician for instructions approaching surgery.   Bathing Clothing  Jewelry  Valuables     If you shower the morning of surgery, please do not apply anything to your skin (lotions, powders, deodorant, or makeup, especially mascara)  Begin bathing with antibacterial soap 3 days prior to

## 2024-05-30 NOTE — PERIOP NOTE
Voicemail left at Dr Lomeli's office requesting pacemaker plan be faxed to PAT ASAP.     5.30/24 @ 0740:  Pacemaker plan not received.  Second voicemail left at Dr Lomeli's office requesting pacemaker plan be faxed.  To be faxed to [facility] pre- op at [phone].

## 2024-05-30 NOTE — PERIOP NOTE
Hello,     You are scheduled to have surgery tomorrow at [facility].     We would like for you to arrive at  7:30 am  We are located on the second floor, [address]. You will check-in at the registration desk located outside the elevators on the second floor prior to proceeding to [address].  Remember nothing to eat or drink after midnight. If you need to take medications the morning of surgery, please take with a few sips of water.   Wear loose, comfortable clothing and leave all your jewelry at home.   You may bring your cell phone with you.  One family member will be allowed in the pre-op area once you are dressed and your IV has been started.   You will need someone to drive you home and be with you for 24 hours post-anesthesia.     We look forward to seeing you! Call [phone] for questions after hours and [phone] between 5:30AM and 6PM.     Thanks!    [facility] PREOP TEAM

## 2024-05-31 ENCOUNTER — ANESTHESIA EVENT (OUTPATIENT)
Facility: HOSPITAL | Age: 84
End: 2024-05-31
Payer: MEDICARE

## 2024-05-31 ENCOUNTER — ANESTHESIA (OUTPATIENT)
Facility: HOSPITAL | Age: 84
End: 2024-05-31
Payer: MEDICARE

## 2024-05-31 ENCOUNTER — HOSPITAL ENCOUNTER (OUTPATIENT)
Facility: HOSPITAL | Age: 84
Setting detail: OUTPATIENT SURGERY
Discharge: HOME | End: 2024-05-31
Attending: ORTHOPAEDIC SURGERY | Admitting: ORTHOPAEDIC SURGERY
Payer: MEDICARE

## 2024-05-31 VITALS
BODY MASS INDEX: 22.56 KG/M2 | OXYGEN SATURATION: 98 % | RESPIRATION RATE: 11 BRPM | WEIGHT: 143.74 LBS | DIASTOLIC BLOOD PRESSURE: 72 MMHG | SYSTOLIC BLOOD PRESSURE: 166 MMHG | HEIGHT: 67 IN | TEMPERATURE: 97.8 F | HEART RATE: 60 BPM

## 2024-05-31 DIAGNOSIS — M65.332 TRIGGER MIDDLE FINGER OF LEFT HAND: Primary | ICD-10-CM

## 2024-05-31 PROCEDURE — 2580000003 HC RX 258: Performed by: ANESTHESIOLOGY

## 2024-05-31 PROCEDURE — 2709999900 HC NON-CHARGEABLE SUPPLY: Performed by: ORTHOPAEDIC SURGERY

## 2024-05-31 PROCEDURE — 3600000012 HC SURGERY LEVEL 2 ADDTL 15MIN: Performed by: ORTHOPAEDIC SURGERY

## 2024-05-31 PROCEDURE — 7100000010 HC PHASE II RECOVERY - FIRST 15 MIN: Performed by: ORTHOPAEDIC SURGERY

## 2024-05-31 PROCEDURE — 7100000001 HC PACU RECOVERY - ADDTL 15 MIN: Performed by: ORTHOPAEDIC SURGERY

## 2024-05-31 PROCEDURE — 6360000002 HC RX W HCPCS: Performed by: ORTHOPAEDIC SURGERY

## 2024-05-31 PROCEDURE — 3700000001 HC ADD 15 MINUTES (ANESTHESIA): Performed by: ORTHOPAEDIC SURGERY

## 2024-05-31 PROCEDURE — 3700000000 HC ANESTHESIA ATTENDED CARE: Performed by: ORTHOPAEDIC SURGERY

## 2024-05-31 PROCEDURE — 7100000011 HC PHASE II RECOVERY - ADDTL 15 MIN: Performed by: ORTHOPAEDIC SURGERY

## 2024-05-31 PROCEDURE — 3600000002 HC SURGERY LEVEL 2 BASE: Performed by: ORTHOPAEDIC SURGERY

## 2024-05-31 PROCEDURE — 2580000003 HC RX 258: Performed by: ORTHOPAEDIC SURGERY

## 2024-05-31 PROCEDURE — 2500000003 HC RX 250 WO HCPCS: Performed by: ORTHOPAEDIC SURGERY

## 2024-05-31 PROCEDURE — 7100000000 HC PACU RECOVERY - FIRST 15 MIN: Performed by: ORTHOPAEDIC SURGERY

## 2024-05-31 PROCEDURE — 6360000002 HC RX W HCPCS: Performed by: NURSE ANESTHETIST, CERTIFIED REGISTERED

## 2024-05-31 RX ORDER — MIDAZOLAM HYDROCHLORIDE 2 MG/2ML
2 INJECTION, SOLUTION INTRAMUSCULAR; INTRAVENOUS
Status: DISCONTINUED | OUTPATIENT
Start: 2024-05-31 | End: 2024-05-31 | Stop reason: HOSPADM

## 2024-05-31 RX ORDER — HYDROCODONE BITARTRATE AND ACETAMINOPHEN 5; 325 MG/1; MG/1
1 TABLET ORAL EVERY 4 HOURS PRN
Qty: 8 TABLET | Refills: 0 | Status: SHIPPED | OUTPATIENT
Start: 2024-05-31 | End: 2024-06-05

## 2024-05-31 RX ORDER — FENTANYL CITRATE 50 UG/ML
INJECTION, SOLUTION INTRAMUSCULAR; INTRAVENOUS PRN
Status: DISCONTINUED | OUTPATIENT
Start: 2024-05-31 | End: 2024-05-31 | Stop reason: SDUPTHER

## 2024-05-31 RX ORDER — PROPOFOL 10 MG/ML
INJECTION, EMULSION INTRAVENOUS PRN
Status: DISCONTINUED | OUTPATIENT
Start: 2024-05-31 | End: 2024-05-31 | Stop reason: SDUPTHER

## 2024-05-31 RX ORDER — SODIUM CHLORIDE, SODIUM LACTATE, POTASSIUM CHLORIDE, CALCIUM CHLORIDE 600; 310; 30; 20 MG/100ML; MG/100ML; MG/100ML; MG/100ML
INJECTION, SOLUTION INTRAVENOUS CONTINUOUS
Status: DISCONTINUED | OUTPATIENT
Start: 2024-05-31 | End: 2024-05-31 | Stop reason: HOSPADM

## 2024-05-31 RX ORDER — NALOXONE HYDROCHLORIDE 0.4 MG/ML
INJECTION, SOLUTION INTRAMUSCULAR; INTRAVENOUS; SUBCUTANEOUS PRN
Status: DISCONTINUED | OUTPATIENT
Start: 2024-05-31 | End: 2024-05-31 | Stop reason: HOSPADM

## 2024-05-31 RX ORDER — DIPHENHYDRAMINE HYDROCHLORIDE 50 MG/ML
12.5 INJECTION INTRAMUSCULAR; INTRAVENOUS
Status: DISCONTINUED | OUTPATIENT
Start: 2024-05-31 | End: 2024-05-31 | Stop reason: HOSPADM

## 2024-05-31 RX ORDER — LIDOCAINE HYDROCHLORIDE 10 MG/ML
1 INJECTION, SOLUTION EPIDURAL; INFILTRATION; INTRACAUDAL; PERINEURAL
Status: DISCONTINUED | OUTPATIENT
Start: 2024-05-31 | End: 2024-05-31 | Stop reason: HOSPADM

## 2024-05-31 RX ORDER — FENTANYL CITRATE 50 UG/ML
100 INJECTION, SOLUTION INTRAMUSCULAR; INTRAVENOUS
Status: DISCONTINUED | OUTPATIENT
Start: 2024-05-31 | End: 2024-05-31 | Stop reason: HOSPADM

## 2024-05-31 RX ORDER — ONDANSETRON 2 MG/ML
4 INJECTION INTRAMUSCULAR; INTRAVENOUS
Status: DISCONTINUED | OUTPATIENT
Start: 2024-05-31 | End: 2024-05-31 | Stop reason: HOSPADM

## 2024-05-31 RX ADMIN — FENTANYL CITRATE 50 MCG: 50 INJECTION, SOLUTION INTRAMUSCULAR; INTRAVENOUS at 09:03

## 2024-05-31 RX ADMIN — PROPOFOL 20 MG: 10 INJECTION, EMULSION INTRAVENOUS at 09:40

## 2024-05-31 RX ADMIN — FENTANYL CITRATE 50 MCG: 50 INJECTION, SOLUTION INTRAMUSCULAR; INTRAVENOUS at 09:07

## 2024-05-31 RX ADMIN — SODIUM CHLORIDE, POTASSIUM CHLORIDE, SODIUM LACTATE AND CALCIUM CHLORIDE: 600; 310; 30; 20 INJECTION, SOLUTION INTRAVENOUS at 08:04

## 2024-05-31 RX ADMIN — PROPOFOL 30 MG: 10 INJECTION, EMULSION INTRAVENOUS at 09:09

## 2024-05-31 RX ADMIN — WATER 2000 MG: 1 INJECTION INTRAMUSCULAR; INTRAVENOUS; SUBCUTANEOUS at 09:16

## 2024-05-31 RX ADMIN — PROPOFOL 50 MCG/KG/MIN: 10 INJECTION, EMULSION INTRAVENOUS at 09:10

## 2024-05-31 ASSESSMENT — PAIN SCALES - GENERAL
PAINLEVEL_OUTOF10: 0
PAINLEVEL_OUTOF10: 0

## 2024-05-31 ASSESSMENT — PAIN DESCRIPTION - DESCRIPTORS: DESCRIPTORS: CRAMPING

## 2024-05-31 ASSESSMENT — PAIN - FUNCTIONAL ASSESSMENT: PAIN_FUNCTIONAL_ASSESSMENT: 0-10

## 2024-05-31 NOTE — ANESTHESIA POSTPROCEDURE EVALUATION
Department of Anesthesiology  Postprocedure Note    Patient: Gilberto Noe Jr  MRN: [id number]  YOB: 1940  Date of evaluation: 5/31/2024    Procedure Summary       Date: 05/31/24 Room / Location: [facility] ASU OR  / [facility] AMBULATORY OR    Anesthesia Start: 0903 Anesthesia Stop: 0952    Procedure: LEFT MIDDLE FINGER A1 PULLEY RELEASE (MAC WITH LOCAL) (Left: Hand) Diagnosis:       Trigger middle finger of left hand      (Trigger middle finger of left hand [M65.332])    Surgeons: Tabby Lomeli MD Responsible Provider: Frantz Ballard MD    Anesthesia Type: Regional ASA Status: 3            Anesthesia Type: Regional    Maegan Phase I: Maegan Score: 9    Maegan Phase II: Maegan Score: 10    Anesthesia Post Evaluation    Patient location during evaluation: PACU  Patient participation: complete - patient participated  Level of consciousness: awake  Pain score: 0  Airway patency: patent  Nausea & Vomiting: no nausea and no vomiting  Cardiovascular status: blood pressure returned to baseline  Respiratory status: acceptable  Hydration status: euvolemic  Pain management: adequate    No notable events documented.

## 2024-05-31 NOTE — ANESTHESIA PRE PROCEDURE
2,000 mg IntraVENous Once Tabby Lomeli MD           Allergies:    Allergies   Allergen Reactions    Iodinated Contrast Media Shortness Of Breath     Lung aspiration  Short of breath       Problem List:    Patient Active Problem List   Diagnosis Code    Generalized weakness R53.1    Hyperlipidemia E78.5    Anxiety F41.9    HTN (hypertension), benign I10    BPH (benign prostatic hyperplasia) N40.0    Arthritis M19.90    Chronic pain G89.29    Coronary artery disease I25.10    Acute lumbar back pain M54.50    Back pain M54.9    Acute ischemic stroke (HCC) I63.9       Past Medical History:        Diagnosis Date    Anxiety     Arthritis     BPH (benign prostatic hyperplasia)     Cerebral artery occlusion with cerebral infarction (HCC) 2020    Chronic pain     Coronary artery disease     GERD (gastroesophageal reflux disease)     HTN (hypertension), benign     Retinal tear, left     Rotator cuff tear     RIGHT        Past Surgical History:        Procedure Laterality Date    ADENOIDECTOMY      APPENDECTOMY      COLONOSCOPY      CORONARY ARTERY BYPASS GRAFT  1986    ORTHOPEDIC SURGERY Right 2021    Middle finger trigger release    PACEMAKER INSERTION      POLYPECTOMY      SPINAL CORD STIMULATOR IMPLANT      TONSILLECTOMY      VASECTOMY         Social History:    Social History     Tobacco Use    Smoking status: Former     Current packs/day: 0.00     Types: Cigarettes     Quit date: 1989     Years since quittin.4    Smokeless tobacco: Never   Substance Use Topics    Alcohol use: Yes     Alcohol/week: 7.0 standard drinks of alcohol     Comment: 7 wine / week                                Counseling given: Not Answered      Vital Signs (Current):   Vitals:    24 1421 24 0754   BP:  (!) 156/71   Pulse:  60   Resp:  14   Temp:  98 °F (36.7 °C)   TempSrc:  Oral   SpO2:  97%   Weight: 65.8 kg (145 lb) 65.2 kg (143 lb 11.8 oz)   Height: 1.702 m (5' 7\") 1.702 m (5' 7\")

## 2024-05-31 NOTE — DISCHARGE INSTRUCTIONS
SEE DR CAMARA's PRE-PRINTED INSTRUCTIONS  (COPY MADE TO PAPER CHART RECORD)    ___________________________________________        DISCHARGE SUMMARY from Your Nurse    PATIENT INSTRUCTIONS:    AFTER ANESTHESIA & SEDATION, and WHILE TAKING PAIN MEDICINE  After general anesthesia / intravenous sedation and the 24 hours following, and/or while taking prescription Opiates:  Limit your activities  Do not drive and operate hazardous machinery until you have been of all narcotics and sedatives for over 24 hours  Do not make important personal or business decisions  Do  not drink alcoholic beverages  If you have not urinated within 8 hours after discharge, please contact your surgeon on call.        SIGNS OF INFECTION, THINGS TO REPORT TO YOUR DOCTOR  Report the following Signs of Infection or General Problems after surgery to your surgeon:  Redness, swelling, drainage, pus or odor of or around the surgical area  Excessive pain not relieved, or discomfort not improved by the medications prescribed by your doctor  Fever/ temperature over 101; Temperature over 100 if on medications (chemotherapy or medicines which affect your ability to fight infections)  Nausea and vomiting lasting longer than 4 hours or if unable to take medications  Any signs of decreased circulation or nerve impairment to extremity: change in color, persistent  numbness, tingling, coldness or increase pain  If you have any questions.      GOOD HELP TO FIGHT AN INFECTION  Here are a few tip to help reduce the chance of getting an infection after surgery:  Wash Your Hands  Good handwashing is the most important thing you and your caregiver can do.  Wash before and after caring for any wounds.  Dry your hand with a clean towel.  Wash with soap and water for at least 20 seconds. A TIP: sing the \"Happy Birthday\" song through one time while washing to help with the timing.  Use a hand  in between washings.    Shower  When your surgeon says it is OK to

## 2024-05-31 NOTE — PERIOP NOTE
POST ANESTHESIA CARE    DISCHARGE / TRANSFER NOTE  Gilberto Noe Jr was:    [x] discharged        via   [x] Wheelchair          to [x] Private Vehicle     [] transferred    [] Carried    [] Taxi / Vehicle \"for Hire\"  [] Walk out   [] Ambulance / Medical Transportation   [] Stretcher   [] Hospital room _**_           [] Bed      Patient was escorted by:      [x] Nurse   [] Volunteer  [] Transporter / Technician  [] Parent      [] Spouse / Family /      Patient verbalized     [x] appreciation and was very pleased with care received   [] frustration with care received       throughout their stay.    Patient was discharged in     [x] pleasant mood  [] sad mood  [] mad mood .     Pain at discharge/transfer was      0  /10.    Discharge, medication and follow-up instructions were verbalized as understood prior to discharge  (if applicable for same-day procedures being discharged.)    All personal belongings have been returned to patient, and patient/family verbally confirm receiving belongings as all present.

## 2024-05-31 NOTE — PERIOP NOTE
PACU-IN REPORT FROM ANESTHESIA    Verbal report received from   Vidal   [] MD/-Anesthesiologist    [x] CRNA   [] with student    CHOICE ANESTHESIA:  [] GENERAL  [] TIVA  [x] MAC  [x] LOCAL  [] REGIONAL  [] SPINAL   [] EPIDURAL   **Note the anesthesia record for medications given intraoperatively.**           [] E.R.A.S. PROTOCOL    SURGICAL PROCEDURE: Procedure(s) (LRB):  LEFT MIDDLE FINGER A1 PULLEY RELEASE (MAC WITH LOCAL) (Left)     SURGEON: Tabby Lomeli MD.    Brief Initial Visual Assessment:    Patient Age: [] Infant(1-12mo)       []Pediatric(1-13yrs)    [] Adolescent(13-18yrs)     [] Adult(18-65yrs)      [x]Geriatric Adult(>65yrs).                   Patient    [] Alert           []Calm & Cooperative      [] Anxious/Restless      [] Combative  Appearance:  [x] Drowsy      [] Confused/Disoriented     [x] Sedated      [] Unresponsive     Oriented x  1            Airway:     [x] Patent          [] \"Difficult Airway\" report by Anesthesia                        [] Obstructs easily/Obstructed on arrival          [] Manual stimulation and/or airway assistance necessary                         [] Airway improved with head/airway repositioning                       Airway Adjuncts Present: [] Oral Airway    [] Nasal Trumpet    [] ETT    [] LMA            Respiratory  [x] Even   [] Labored   [] Shallow   [] Tachypnea (>28 RR/min)  [] Bradypnea (<10 RR/min)  Pattern:    [x] Non-Labored  [] VENT and/or respiratory assistance     being provided.        Skin:     [x] Pink [] Dusky    [] Pale         [x] Warm     [] Hot [] Cool       [] Cold    [x]Dry     [] Moist [] Diaphoretic     Membranes:  [x] Pink    [] Pale        [x] Moist [] Dry     [] Crusty     Pain:   [x] No Acute Discomfort.    0  /10 Scale [x] Verbal Numeric / Visual   [] Moderate Discomfort.      [] V.A.S.    [] Acute Discomfort.                 [] A.N.V.    [] Chronic-Issue Related Discomfort.   [] F.L.A.C.C.   Note E-MAR for medications administered.

## 2024-05-31 NOTE — BRIEF OP NOTE
Brief Postoperative Note      Patient: Gilberto Noe Jr  YOB: 1940  MRN: [id number]    Date of Procedure: 5/31/2024    Pre-Op Diagnosis Codes:     * Trigger middle finger of left hand [M65.332]    Post-Op Diagnosis: Same       Procedure(s):  LEFT MIDDLE FINGER A1 PULLEY RELEASE (MAC WITH LOCAL)    Surgeon(s):  Tabby Lomeli MD    Assistant:  Surgical Assistant: Kareem Henley    Anesthesia: Monitor Anesthesia Care    Estimated Blood Loss (mL): Minimal    Complications: None    Specimens:   * No specimens in log *    Implants:  * No implants in log *      Drains: * No LDAs found *    Findings:  Infection Present At Time Of Surgery (PATOS) (choose all levels that have infection present):  No infection present  Other Findings: As Above    Electronically signed by Tabby Lomeli MD on 5/31/2024 at 9:48 AM

## 2024-06-01 NOTE — OP NOTE
[facility]          [address] [place]  [address]                            OPERATIVE REPORT      PATIENT NAME: RADHA SUTTON JR          : 1940  MED REC NO: [id number]                       ROOM: [facility]  ACCOUNT NO: [id number]                       ADMIT DATE: 2024  PROVIDER: Tabby Lomeli MD    DATE OF SERVICE:  2024    PREOPERATIVE DIAGNOSES:  Left middle finger trigger digit.    POSTOPERATIVE DIAGNOSES:  Left middle finger trigger digit.    PROCEDURES PERFORMED:  Left middle finger A1 pulley release.    SURGEON:  Tabby Lomeli MD    ASSISTANT:  Staff.    ANESTHESIA:  Local and MAC.    ESTIMATED BLOOD LOSS:  Minimal.    SPECIMENS REMOVED:  None.    The patient was returned to the PACU in stable condition.    INTRAOPERATIVE FINDINGS:  ***     COMPLICATIONS:  ***    IMPLANTS:  None.    INDICATIONS:  This is an 83-year-old right-hand dominant gentleman who has been experiencing catching and locking of his left middle finger.  He had been treated with previous cortisone injections with temporary relief of his symptoms.  After risks, benefits and alternatives were discussed with him, he elected to proceed with the aforementioned procedure.    DESCRIPTION OF PROCEDURE:  The patient was identified in the preoperative holding area.  His left middle finger was signed.  He was brought back to the operating room.  Formal time-out was called to identify the correct surgical site.  Prior to starting, the patient was provided a digital block proximal to the left middle finger A1 pulley site using 1% lidocaine, 0.5% Marcaine plain, approximately 78 mL total.  Then, hand and arm were prepped and draped in normal sterile fashion.  The patient did receive preoperative antibiotics half an hour within the incision time.  After the tourniquet was inflated, a longitudinal incision was made over the A1 pulley site and distally as well as proximally coming through

## 2024-09-25 ENCOUNTER — ANESTHESIA EVENT (OUTPATIENT)
Facility: HOSPITAL | Age: 84
End: 2024-09-25
Payer: MEDICARE

## 2024-09-25 ENCOUNTER — HOSPITAL ENCOUNTER (OUTPATIENT)
Facility: HOSPITAL | Age: 84
Setting detail: OUTPATIENT SURGERY
Discharge: HOME | End: 2024-09-25
Attending: INTERNAL MEDICINE | Admitting: INTERNAL MEDICINE
Payer: MEDICARE

## 2024-09-25 ENCOUNTER — ANESTHESIA (OUTPATIENT)
Facility: HOSPITAL | Age: 84
End: 2024-09-25
Payer: MEDICARE

## 2024-09-25 VITALS
RESPIRATION RATE: 16 BRPM | OXYGEN SATURATION: 100 % | HEART RATE: 62 BPM | DIASTOLIC BLOOD PRESSURE: 69 MMHG | WEIGHT: 134.48 LBS | BODY MASS INDEX: 21.11 KG/M2 | SYSTOLIC BLOOD PRESSURE: 126 MMHG | HEIGHT: 67 IN | TEMPERATURE: 98 F

## 2024-09-25 PROCEDURE — 7100000011 HC PHASE II RECOVERY - ADDTL 15 MIN: Performed by: INTERNAL MEDICINE

## 2024-09-25 PROCEDURE — 3600007512: Performed by: INTERNAL MEDICINE

## 2024-09-25 PROCEDURE — 3700000000 HC ANESTHESIA ATTENDED CARE: Performed by: INTERNAL MEDICINE

## 2024-09-25 PROCEDURE — C1889 IMPLANT/INSERT DEVICE, NOC: HCPCS | Performed by: INTERNAL MEDICINE

## 2024-09-25 PROCEDURE — 2580000003 HC RX 258: Performed by: NURSE ANESTHETIST, CERTIFIED REGISTERED

## 2024-09-25 PROCEDURE — 7100000010 HC PHASE II RECOVERY - FIRST 15 MIN: Performed by: INTERNAL MEDICINE

## 2024-09-25 PROCEDURE — 2709999900 HC NON-CHARGEABLE SUPPLY: Performed by: INTERNAL MEDICINE

## 2024-09-25 PROCEDURE — 6360000002 HC RX W HCPCS: Performed by: NURSE ANESTHETIST, CERTIFIED REGISTERED

## 2024-09-25 PROCEDURE — 3700000001 HC ADD 15 MINUTES (ANESTHESIA): Performed by: INTERNAL MEDICINE

## 2024-09-25 PROCEDURE — 3600007502: Performed by: INTERNAL MEDICINE

## 2024-09-25 DEVICE — WORKING LENGTH 235CM, WORKING CHANNEL 2.8MM
Type: IMPLANTABLE DEVICE | Site: ASCENDING COLON | Status: FUNCTIONAL
Brand: RESOLUTION 360 CLIP

## 2024-09-25 RX ORDER — SODIUM CHLORIDE 9 MG/ML
25 INJECTION, SOLUTION INTRAVENOUS PRN
Status: DISCONTINUED | OUTPATIENT
Start: 2024-09-25 | End: 2024-09-25 | Stop reason: HOSPADM

## 2024-09-25 RX ORDER — SODIUM CHLORIDE 0.9 % (FLUSH) 0.9 %
5-40 SYRINGE (ML) INJECTION EVERY 12 HOURS SCHEDULED
Status: DISCONTINUED | OUTPATIENT
Start: 2024-09-25 | End: 2024-09-25 | Stop reason: HOSPADM

## 2024-09-25 RX ORDER — ONDANSETRON 4 MG/1
4 TABLET, ORALLY DISINTEGRATING ORAL EVERY 8 HOURS PRN
Status: DISCONTINUED | OUTPATIENT
Start: 2024-09-25 | End: 2024-09-25 | Stop reason: HOSPADM

## 2024-09-25 RX ORDER — ONDANSETRON 2 MG/ML
4 INJECTION INTRAMUSCULAR; INTRAVENOUS EVERY 6 HOURS PRN
Status: DISCONTINUED | OUTPATIENT
Start: 2024-09-25 | End: 2024-09-25 | Stop reason: HOSPADM

## 2024-09-25 RX ORDER — SODIUM CHLORIDE 0.9 % (FLUSH) 0.9 %
5-40 SYRINGE (ML) INJECTION PRN
Status: DISCONTINUED | OUTPATIENT
Start: 2024-09-25 | End: 2024-09-25 | Stop reason: HOSPADM

## 2024-09-25 RX ORDER — FLUTICASONE PROPIONATE 50 MCG
SPRAY, SUSPENSION (ML) NASAL
COMMUNITY
Start: 2024-06-18

## 2024-09-25 RX ORDER — OFLOXACIN 3 MG/ML
SOLUTION AURICULAR (OTIC)
COMMUNITY
Start: 2024-04-11

## 2024-09-25 RX ORDER — SODIUM CHLORIDE 9 MG/ML
INJECTION, SOLUTION INTRAVENOUS
Status: DISCONTINUED | OUTPATIENT
Start: 2024-09-25 | End: 2024-09-25 | Stop reason: SDUPTHER

## 2024-09-25 RX ORDER — SODIUM CHLORIDE 9 MG/ML
INJECTION, SOLUTION INTRAVENOUS PRN
Status: DISCONTINUED | OUTPATIENT
Start: 2024-09-25 | End: 2024-09-25 | Stop reason: HOSPADM

## 2024-09-25 RX ORDER — PROPOFOL 10 MG/ML
INJECTION, EMULSION INTRAVENOUS
Status: DISCONTINUED | OUTPATIENT
Start: 2024-09-25 | End: 2024-09-25 | Stop reason: SDUPTHER

## 2024-09-25 RX ADMIN — PROPOFOL 50 MG: 10 INJECTION, EMULSION INTRAVENOUS at 15:21

## 2024-09-25 RX ADMIN — PROPOFOL 180 MCG/KG/MIN: 10 INJECTION, EMULSION INTRAVENOUS at 15:22

## 2024-09-25 RX ADMIN — SODIUM CHLORIDE: 9 INJECTION, SOLUTION INTRAVENOUS at 14:29

## 2024-09-25 ASSESSMENT — PAIN - FUNCTIONAL ASSESSMENT: PAIN_FUNCTIONAL_ASSESSMENT: 0-10

## 2024-10-02 ENCOUNTER — HOSPITAL ENCOUNTER (EMERGENCY)
Facility: HOSPITAL | Age: 84
Discharge: HOME | End: 2024-10-02
Attending: EMERGENCY MEDICINE
Payer: MEDICARE

## 2024-10-02 ENCOUNTER — APPOINTMENT (OUTPATIENT)
Facility: HOSPITAL | Age: 84
End: 2024-10-02
Payer: MEDICARE

## 2024-10-02 VITALS
BODY MASS INDEX: 22.88 KG/M2 | RESPIRATION RATE: 16 BRPM | HEART RATE: 60 BPM | TEMPERATURE: 97.8 F | DIASTOLIC BLOOD PRESSURE: 75 MMHG | OXYGEN SATURATION: 100 % | HEIGHT: 64 IN | SYSTOLIC BLOOD PRESSURE: 157 MMHG | WEIGHT: 134 LBS

## 2024-10-02 DIAGNOSIS — Z98.890 STATUS POST COLONOSCOPY: ICD-10-CM

## 2024-10-02 DIAGNOSIS — R53.1 GENERAL WEAKNESS: ICD-10-CM

## 2024-10-02 DIAGNOSIS — N30.00 ACUTE CYSTITIS WITHOUT HEMATURIA: Primary | ICD-10-CM

## 2024-10-02 LAB
ABO + RH BLD: NORMAL
ALBUMIN SERPL-MCNC: 3.6 G/DL (ref 3.5–5)
ALBUMIN/GLOB SERPL: 1 (ref 1.1–2.2)
ALP SERPL-CCNC: 98 U/L (ref 45–117)
ALT SERPL-CCNC: 32 U/L (ref 12–78)
ANION GAP SERPL CALC-SCNC: 3 MMOL/L (ref 2–12)
APPEARANCE UR: CLEAR
AST SERPL-CCNC: 18 U/L (ref 15–37)
BACTERIA URNS QL MICRO: NEGATIVE /HPF
BASOPHILS # BLD: 0 K/UL (ref 0–0.1)
BASOPHILS NFR BLD: 0 % (ref 0–1)
BILIRUB SERPL-MCNC: 0.4 MG/DL (ref 0.2–1)
BILIRUB UR QL: NEGATIVE
BLOOD GROUP ANTIBODIES SERPL: NORMAL
BUN SERPL-MCNC: 16 MG/DL (ref 6–20)
BUN/CREAT SERPL: 16 (ref 12–20)
CALCIUM SERPL-MCNC: 9.5 MG/DL (ref 8.5–10.1)
CHLORIDE SERPL-SCNC: 104 MMOL/L (ref 97–108)
CO2 SERPL-SCNC: 29 MMOL/L (ref 21–32)
COLOR UR: ABNORMAL
CREAT SERPL-MCNC: 0.97 MG/DL (ref 0.7–1.3)
DIFFERENTIAL METHOD BLD: ABNORMAL
EOSINOPHIL # BLD: 0.1 K/UL (ref 0–0.4)
EOSINOPHIL NFR BLD: 1 % (ref 0–7)
EPITH CASTS URNS QL MICRO: ABNORMAL /LPF
ERYTHROCYTE [DISTWIDTH] IN BLOOD BY AUTOMATED COUNT: 13 % (ref 11.5–14.5)
GLOBULIN SER CALC-MCNC: 3.5 G/DL (ref 2–4)
GLUCOSE SERPL-MCNC: 140 MG/DL (ref 65–100)
GLUCOSE UR STRIP.AUTO-MCNC: NEGATIVE MG/DL
HCT VFR BLD AUTO: 43.2 % (ref 36.6–50.3)
HEMOCCULT STL QL: NEGATIVE
HGB BLD-MCNC: 14.3 G/DL (ref 12.1–17)
HGB UR QL STRIP: ABNORMAL
IMM GRANULOCYTES # BLD AUTO: 0 K/UL (ref 0–0.04)
IMM GRANULOCYTES NFR BLD AUTO: 0 % (ref 0–0.5)
KETONES UR QL STRIP.AUTO: NEGATIVE MG/DL
LEUKOCYTE ESTERASE UR QL STRIP.AUTO: ABNORMAL
LYMPHOCYTES # BLD: 1.1 K/UL (ref 0.8–3.5)
LYMPHOCYTES NFR BLD: 11 % (ref 12–49)
MCH RBC QN AUTO: 29.8 PG (ref 26–34)
MCHC RBC AUTO-ENTMCNC: 33.1 G/DL (ref 30–36.5)
MCV RBC AUTO: 90 FL (ref 80–99)
MONOCYTES # BLD: 0.6 K/UL (ref 0–1)
MONOCYTES NFR BLD: 6 % (ref 5–13)
NEUTS SEG # BLD: 8 K/UL (ref 1.8–8)
NEUTS SEG NFR BLD: 82 % (ref 32–75)
NITRITE UR QL STRIP.AUTO: NEGATIVE
NRBC # BLD: 0 K/UL (ref 0–0.01)
NRBC BLD-RTO: 0 PER 100 WBC
PH UR STRIP: 7 (ref 5–8)
PLATELET # BLD AUTO: 196 K/UL (ref 150–400)
PMV BLD AUTO: 9.7 FL (ref 8.9–12.9)
POTASSIUM SERPL-SCNC: 3.8 MMOL/L (ref 3.5–5.1)
PROT SERPL-MCNC: 7.1 G/DL (ref 6.4–8.2)
PROT UR STRIP-MCNC: ABNORMAL MG/DL
RBC # BLD AUTO: 4.8 M/UL (ref 4.1–5.7)
RBC #/AREA URNS HPF: ABNORMAL /HPF (ref 0–5)
SODIUM SERPL-SCNC: 136 MMOL/L (ref 136–145)
SP GR UR REFRACTOMETRY: 1.01 (ref 1–1.03)
SPECIMEN EXP DATE BLD: NORMAL
TROPONIN I SERPL HS-MCNC: 9 NG/L (ref 0–76)
UROBILINOGEN UR QL STRIP.AUTO: 0.2 EU/DL (ref 0.2–1)
WBC # BLD AUTO: 9.8 K/UL (ref 4.1–11.1)
WBC URNS QL MICRO: >100 /HPF (ref 0–4)

## 2024-10-02 PROCEDURE — 86901 BLOOD TYPING SEROLOGIC RH(D): CPT

## 2024-10-02 PROCEDURE — 84484 ASSAY OF TROPONIN QUANT: CPT

## 2024-10-02 PROCEDURE — 85025 COMPLETE CBC W/AUTO DIFF WBC: CPT

## 2024-10-02 PROCEDURE — 96374 THER/PROPH/DIAG INJ IV PUSH: CPT

## 2024-10-02 PROCEDURE — 99285 EMERGENCY DEPT VISIT HI MDM: CPT

## 2024-10-02 PROCEDURE — 86900 BLOOD TYPING SEROLOGIC ABO: CPT

## 2024-10-02 PROCEDURE — 6360000002 HC RX W HCPCS: Performed by: EMERGENCY MEDICINE

## 2024-10-02 PROCEDURE — 82272 OCCULT BLD FECES 1-3 TESTS: CPT

## 2024-10-02 PROCEDURE — 94761 N-INVAS EAR/PLS OXIMETRY MLT: CPT

## 2024-10-02 PROCEDURE — 80053 COMPREHEN METABOLIC PANEL: CPT

## 2024-10-02 PROCEDURE — 36415 COLL VENOUS BLD VENIPUNCTURE: CPT

## 2024-10-02 PROCEDURE — 86850 RBC ANTIBODY SCREEN: CPT

## 2024-10-02 PROCEDURE — 2580000003 HC RX 258: Performed by: EMERGENCY MEDICINE

## 2024-10-02 PROCEDURE — 93005 ELECTROCARDIOGRAM TRACING: CPT | Performed by: EMERGENCY MEDICINE

## 2024-10-02 PROCEDURE — 71045 X-RAY EXAM CHEST 1 VIEW: CPT

## 2024-10-02 PROCEDURE — 81001 URINALYSIS AUTO W/SCOPE: CPT

## 2024-10-02 RX ORDER — 0.9 % SODIUM CHLORIDE 0.9 %
1000 INTRAVENOUS SOLUTION INTRAVENOUS ONCE
Status: COMPLETED | OUTPATIENT
Start: 2024-10-02 | End: 2024-10-02

## 2024-10-02 RX ADMIN — WATER 1000 MG: 1 INJECTION INTRAMUSCULAR; INTRAVENOUS; SUBCUTANEOUS at 13:17

## 2024-10-02 RX ADMIN — SODIUM CHLORIDE 1000 ML: 9 INJECTION, SOLUTION INTRAVENOUS at 10:32

## 2024-10-02 ASSESSMENT — PAIN - FUNCTIONAL ASSESSMENT: PAIN_FUNCTIONAL_ASSESSMENT: 0-10

## 2024-10-02 ASSESSMENT — PAIN SCALES - GENERAL: PAINLEVEL_OUTOF10: 0

## 2024-10-02 NOTE — ED TRIAGE NOTES
Pt arrives to the ER for complaints of melena since 9/25 since he had a colonoscopy.     Pt reports that he went to void this morning and noticed blood in his urine as well.     Pt reports weakness and states that he has had near syncopal episodes.

## 2024-10-02 NOTE — CONSULTS
(FLONASE) 50 MCG/ACT nasal spray     ofloxacin (FLOXIN) 0.3 % otic solution 1 drop into Left Eye Ophthalmic Four times a day for 10 days    aspirin 81 MG EC tablet Take 1 tablet by mouth daily    tadalafil (CIALIS) 5 MG tablet Take 1 tablet by mouth every morning    traZODone (DESYREL) 50 MG tablet Take 1 tablet by mouth nightly (Patient not taking: Reported on 9/25/2024)    UNKNOWN TO PATIENT Uses a nasal spray. Will bring information.    NONFORMULARY Takes a salt pill po once daily. (Patient not taking: Reported on 9/25/2024)    Naproxen Sodium (ALEVE PO) Take by mouth Takes one po daily.    gabapentin (NEURONTIN) 100 MG capsule Take 1 capsule by mouth 3 times daily. (Patient not taking: Reported on 9/25/2024)    sodium chloride 1 g tablet Take 1 tablet by mouth daily (Patient not taking: Reported on 9/25/2024)    ALPRAZolam (XANAX) 0.5 MG tablet Take 0.5 tablets by mouth 3 times daily as needed for Anxiety.    atorvastatin (LIPITOR) 40 MG tablet Take 1 tablet by mouth nightly    testosterone cypionate (DEPOTESTOTERONE CYPIONATE) 200 MG/ML injection Inject 1 mL into the muscle every 14 days.       Review of Systems: Admission ROS by No admitting provider for patient encounter. from 10/2/2024 were reviewed with the patient and changes (other than per HPI) include: none      Objective:     Physical Exam:  Vitals:    10/02/24 1201   BP:    Pulse: 60   Resp:    Temp:    SpO2:      SpO2 Readings from Last 6 Encounters:   10/02/24 100%   09/25/24 100%   05/31/24 98%   04/10/24 99%        No intake or output data in the 24 hours ending 10/02/24 1354     General: alert, well appearing, and no distress  Head: Normocephalic, without obvious abnormality, atraumatic  Eyes: anicteric sclerae and conjuntiva clear  Lungs: clear to auscultation with good breath sounds and normal respiratory effort  Heart: regular rate and regular rhythm  Abd: not distended, soft, nontender, BS present and normactive  Ext: no cyanosis and no

## 2024-10-02 NOTE — ED PROVIDER NOTES
black stools and hematuria.  He is status post colonoscopy 1 week ago.  He states that he had a polyp clamped.  He has noted black stools since then.  Differential diagnosis includes GI bleed and others.  He also complains of dysuria/hematuria.  Differential diagnosis includes UTI (UA positive), sepsis, and others.  Reassuring appearance/exam with stable vital signs.  Rocephin in the ED.  Keflex for home.  Follow-up GI.  Return precautions.  Hemoglobin 14.3.  White blood cell count is normal.  Chemistries, troponin okay.  UA with greater than 100 white cells.  Portable chest with no acute process.  Peter Wang MD  2:59 PM      DISPOSITION/PLAN   DISPOSITION        PATIENT REFERRED TO:  No follow-up provider specified.    DISCHARGE MEDICATIONS:  New Prescriptions    No medications on file         (Please note that portions of this note were completed with a voice recognition program.  Efforts were made to edit the dictations but occasionally words are mis-transcribed.)    Peter Wang MD (electronically signed)  Emergency Attending Physician / Physician Assistant / Nurse Practitioner             Peter Wang MD  10/02/24 2754

## 2024-10-03 LAB
EKG ATRIAL RATE: 60 BPM
EKG DIAGNOSIS: NORMAL
EKG P-R INTERVAL: 214 MS
EKG Q-T INTERVAL: 432 MS
EKG QRS DURATION: 94 MS
EKG QTC CALCULATION (BAZETT): 432 MS
EKG R AXIS: 28 DEGREES
EKG T AXIS: 23 DEGREES
EKG VENTRICULAR RATE: 60 BPM

## 2024-10-03 PROCEDURE — 93010 ELECTROCARDIOGRAM REPORT: CPT | Performed by: SPECIALIST

## 2025-06-03 ENCOUNTER — APPOINTMENT (OUTPATIENT)
Facility: HOSPITAL | Age: 85
End: 2025-06-03
Payer: MEDICARE

## 2025-06-03 ENCOUNTER — HOSPITAL ENCOUNTER (EMERGENCY)
Facility: HOSPITAL | Age: 85
Discharge: HOME | End: 2025-06-03
Attending: STUDENT IN AN ORGANIZED HEALTH CARE EDUCATION/TRAINING PROGRAM
Payer: MEDICARE

## 2025-06-03 VITALS
HEART RATE: 60 BPM | SYSTOLIC BLOOD PRESSURE: 113 MMHG | OXYGEN SATURATION: 95 % | BODY MASS INDEX: 22.76 KG/M2 | RESPIRATION RATE: 17 BRPM | TEMPERATURE: 97.9 F | HEIGHT: 67 IN | WEIGHT: 145 LBS | DIASTOLIC BLOOD PRESSURE: 47 MMHG
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
## 2025-06-03 DIAGNOSIS — M25.511 ACUTE PAIN OF RIGHT SHOULDER: ICD-10-CM

## 2025-06-03 DIAGNOSIS — M54.50 ACUTE RIGHT-SIDED LOW BACK PAIN WITHOUT SCIATICA: ICD-10-CM

## 2025-06-03 LAB
ALBUMIN SERPL-MCNC: 3.6 G/DL (ref 3.5–5)
ALBUMIN/GLOB SERPL: 1.2 (ref 1.1–2.2)
ALP SERPL-CCNC: 83 U/L (ref 45–117)
ALT SERPL-CCNC: 21 U/L (ref 12–78)
ANION GAP SERPL CALC-SCNC: 6 MMOL/L (ref 2–12)
AST SERPL-CCNC: 17 U/L (ref 15–37)
BASOPHILS # BLD: 0.01 K/UL (ref 0–0.1)
BASOPHILS NFR BLD: 0.2 % (ref 0–1)
BILIRUB SERPL-MCNC: 0.9 MG/DL (ref 0.2–1)
BUN SERPL-MCNC: 19 MG/DL (ref 6–20)
BUN/CREAT SERPL: 15 (ref 12–20)
CALCIUM SERPL-MCNC: 8.7 MG/DL (ref 8.5–10.1)
CHLORIDE SERPL-SCNC: 105 MMOL/L (ref 97–108)
CK SERPL-CCNC: 126 U/L (ref 39–308)
CO2 SERPL-SCNC: 26 MMOL/L (ref 21–32)
CREAT SERPL-MCNC: 1.29 MG/DL (ref 0.7–1.3)
DIFFERENTIAL METHOD BLD: ABNORMAL
EKG ATRIAL RATE: 61 BPM
EKG DIAGNOSIS: NORMAL
EKG P-R INTERVAL: 238 MS
EKG Q-T INTERVAL: 430 MS
EKG QRS DURATION: 98 MS
EKG QTC CALCULATION (BAZETT): 432 MS
EKG R AXIS: 15 DEGREES
EKG T AXIS: 47 DEGREES
EKG VENTRICULAR RATE: 61 BPM
EOSINOPHIL # BLD: 0.03 K/UL (ref 0–0.4)
EOSINOPHIL NFR BLD: 0.5 % (ref 0–7)
ERYTHROCYTE [DISTWIDTH] IN BLOOD BY AUTOMATED COUNT: 14.1 % (ref 11.5–14.5)
GLOBULIN SER CALC-MCNC: 3.1 G/DL (ref 2–4)
GLUCOSE SERPL-MCNC: 111 MG/DL (ref 65–100)
HCT VFR BLD AUTO: 43.4 % (ref 36.6–50.3)
HGB BLD-MCNC: 14.4 G/DL (ref 12.1–17)
IMM GRANULOCYTES # BLD AUTO: 0.02 K/UL (ref 0–0.04)
IMM GRANULOCYTES NFR BLD AUTO: 0.3 % (ref 0–0.5)
LIPASE SERPL-CCNC: 17 U/L (ref 13–75)
LYMPHOCYTES # BLD: 0.72 K/UL (ref 0.8–3.5)
LYMPHOCYTES NFR BLD: 12 % (ref 12–49)
MCH RBC QN AUTO: 30.4 PG (ref 26–34)
MCHC RBC AUTO-ENTMCNC: 33.2 G/DL (ref 30–36.5)
MCV RBC AUTO: 91.8 FL (ref 80–99)
MONOCYTES # BLD: 0.49 K/UL (ref 0–1)
MONOCYTES NFR BLD: 8.2 % (ref 5–13)
NEUTS SEG # BLD: 4.73 K/UL (ref 1.8–8)
NEUTS SEG NFR BLD: 78.8 % (ref 32–75)
NRBC # BLD: 0 K/UL (ref 0–0.01)
NRBC BLD-RTO: 0 PER 100 WBC
PLATELET # BLD AUTO: 158 K/UL (ref 150–400)
PMV BLD AUTO: 9.5 FL (ref 8.9–12.9)
POTASSIUM SERPL-SCNC: 3.6 MMOL/L (ref 3.5–5.1)
PROT SERPL-MCNC: 6.7 G/DL (ref 6.4–8.2)
RBC # BLD AUTO: 4.73 M/UL (ref 4.1–5.7)
RBC MORPH BLD: ABNORMAL
SODIUM SERPL-SCNC: 137 MMOL/L (ref 136–145)
TROPONIN I SERPL HS-MCNC: 17 NG/L (ref 0–76)
WBC # BLD AUTO: 6 K/UL (ref 4.1–11.1)

## 2025-06-03 PROCEDURE — 82550 ASSAY OF CK (CPK): CPT

## 2025-06-03 PROCEDURE — 36415 COLL VENOUS BLD VENIPUNCTURE: CPT

## 2025-06-03 PROCEDURE — 96375 TX/PRO/DX INJ NEW DRUG ADDON: CPT

## 2025-06-03 PROCEDURE — 99285 EMERGENCY DEPT VISIT HI MDM: CPT

## 2025-06-03 PROCEDURE — 2580000003 HC RX 258: Performed by: STUDENT IN AN ORGANIZED HEALTH CARE EDUCATION/TRAINING PROGRAM

## 2025-06-03 PROCEDURE — 84484 ASSAY OF TROPONIN QUANT: CPT

## 2025-06-03 PROCEDURE — 72125 CT NECK SPINE W/O DYE: CPT

## 2025-06-03 PROCEDURE — 83690 ASSAY OF LIPASE: CPT

## 2025-06-03 PROCEDURE — 6360000002 HC RX W HCPCS: Performed by: STUDENT IN AN ORGANIZED HEALTH CARE EDUCATION/TRAINING PROGRAM

## 2025-06-03 PROCEDURE — 71250 CT THORAX DX C-: CPT

## 2025-06-03 PROCEDURE — 96374 THER/PROPH/DIAG INJ IV PUSH: CPT

## 2025-06-03 PROCEDURE — 80053 COMPREHEN METABOLIC PANEL: CPT

## 2025-06-03 PROCEDURE — 93010 ELECTROCARDIOGRAM REPORT: CPT | Performed by: SPECIALIST

## 2025-06-03 PROCEDURE — 73030 X-RAY EXAM OF SHOULDER: CPT

## 2025-06-03 PROCEDURE — 73060 X-RAY EXAM OF HUMERUS: CPT

## 2025-06-03 PROCEDURE — 6370000000 HC RX 637 (ALT 250 FOR IP): Performed by: STUDENT IN AN ORGANIZED HEALTH CARE EDUCATION/TRAINING PROGRAM

## 2025-06-03 PROCEDURE — 85025 COMPLETE CBC W/AUTO DIFF WBC: CPT

## 2025-06-03 PROCEDURE — 70450 CT HEAD/BRAIN W/O DYE: CPT

## 2025-06-03 PROCEDURE — 93005 ELECTROCARDIOGRAM TRACING: CPT | Performed by: STUDENT IN AN ORGANIZED HEALTH CARE EDUCATION/TRAINING PROGRAM

## 2025-06-03 RX ORDER — KETOROLAC TROMETHAMINE 15 MG/ML
15 INJECTION, SOLUTION INTRAMUSCULAR; INTRAVENOUS ONCE
Status: COMPLETED | OUTPATIENT
Start: 2025-06-03 | End: 2025-06-03

## 2025-06-03 RX ORDER — ACETAMINOPHEN 325 MG/1
650 TABLET ORAL
Status: COMPLETED | OUTPATIENT
Start: 2025-06-03 | End: 2025-06-03

## 2025-06-03 RX ORDER — 0.9 % SODIUM CHLORIDE 0.9 %
1000 INTRAVENOUS SOLUTION INTRAVENOUS ONCE
Status: COMPLETED | OUTPATIENT
Start: 2025-06-03 | End: 2025-06-03

## 2025-06-03 RX ORDER — LIDOCAINE 4 G/G
1 PATCH TOPICAL
Status: DISCONTINUED | OUTPATIENT
Start: 2025-06-03 | End: 2025-06-03 | Stop reason: HOSPADM

## 2025-06-03 RX ORDER — ONDANSETRON 2 MG/ML
4 INJECTION INTRAMUSCULAR; INTRAVENOUS ONCE
Status: COMPLETED | OUTPATIENT
Start: 2025-06-03 | End: 2025-06-03

## 2025-06-03 RX ORDER — MORPHINE SULFATE 2 MG/ML
2 INJECTION, SOLUTION INTRAMUSCULAR; INTRAVENOUS
Status: COMPLETED | OUTPATIENT
Start: 2025-06-03 | End: 2025-06-03

## 2025-06-03 RX ADMIN — SODIUM CHLORIDE 1000 ML: 0.9 INJECTION, SOLUTION INTRAVENOUS at 05:37

## 2025-06-03 RX ADMIN — ONDANSETRON 4 MG: 2 INJECTION, SOLUTION INTRAMUSCULAR; INTRAVENOUS at 04:57

## 2025-06-03 RX ADMIN — ACETAMINOPHEN 650 MG: 325 TABLET ORAL at 07:25

## 2025-06-03 RX ADMIN — MORPHINE SULFATE 2 MG: 2 INJECTION, SOLUTION INTRAMUSCULAR; INTRAVENOUS at 04:57

## 2025-06-03 RX ADMIN — KETOROLAC TROMETHAMINE 15 MG: 15 INJECTION, SOLUTION INTRAMUSCULAR; INTRAVENOUS at 05:36

## 2025-06-03 ASSESSMENT — PAIN DESCRIPTION - DESCRIPTORS: DESCRIPTORS: STABBING;SHARP

## 2025-06-03 ASSESSMENT — PAIN SCALES - GENERAL: PAINLEVEL_OUTOF10: 10

## 2025-06-03 ASSESSMENT — PAIN DESCRIPTION - ORIENTATION: ORIENTATION: RIGHT

## 2025-06-03 ASSESSMENT — PAIN DESCRIPTION - LOCATION: LOCATION: SHOULDER

## 2025-06-03 ASSESSMENT — ENCOUNTER SYMPTOMS
BACK PAIN: 1
SHORTNESS OF BREATH: 0
ABDOMINAL PAIN: 0

## 2025-06-03 ASSESSMENT — PAIN - FUNCTIONAL ASSESSMENT: PAIN_FUNCTIONAL_ASSESSMENT: 0-10

## 2025-06-03 NOTE — ED PROVIDER NOTES
[facility] EMERGENCY DEPARTMENT  EMERGENCY DEPARTMENT ENCOUNTER      Pt Name: Gilberto Noe Jr  MRN: [id number]  Birthdate 1940  Date of evaluation: 6/3/2025  Provider: Yovanny Edwards DO    CHIEF COMPLAINT       Chief Complaint   Patient presents with    Fall    Shoulder Injury         HISTORY OF PRESENT ILLNESS   (Location/Symptom, Timing/Onset, Context/Setting, Quality, Duration, Modifying Factors, Severity)  Note limiting factors.   84-year-old male brought to the ED for evaluation of bilateral right shoulder pain.  Patient with up and going to the bathroom and fell, has right arm pain, has been having diarrhea most of the evening, no chest pain no shortness of breath does have some back pain.  No fevers.            Review of External Medical Records:     Nursing Notes were reviewed.    REVIEW OF SYSTEMS    (2-9 systems for level 4, 10 or more for level 5)     Review of Systems   Constitutional:  Negative for fever.   Respiratory:  Negative for shortness of breath.    Cardiovascular:  Negative for chest pain.   Gastrointestinal:  Negative for abdominal pain.   Musculoskeletal:  Positive for back pain.   Neurological:  Negative for weakness, numbness and headaches.       Except as noted above the remainder of the review of systems was reviewed and negative.       PAST MEDICAL HISTORY     Past Medical History:   Diagnosis Date    Anxiety     Arthritis     BPH (benign prostatic hyperplasia)     Cerebral artery occlusion with cerebral infarction (HCC) 2020    Chronic pain     Coronary artery disease 1988    GERD (gastroesophageal reflux disease)     HTN (hypertension), benign     Retinal tear, left     Rotator cuff tear     RIGHT          SURGICAL HISTORY       Past Surgical History:   Procedure Laterality Date    ADENOIDECTOMY  1955    APPENDECTOMY  1955    COLONOSCOPY      COLONOSCOPY N/A 9/25/2024    COLONOSCOPY DIAGNOSTIC performed by Andrew Pardo MD at [facility] ENDOSCOPY

## 2025-06-03 NOTE — ED NOTES
Pt spouse expressed concerns about not being able to take care of the pt at home, Provider notified and offered for the pt to be seen by case management for placement at a skilled facility     Pt and spouse declined this offer to be seen by case management     MD provided additional pain interventions to pt before discharge

## 2025-06-03 NOTE — ED TRIAGE NOTES
Pt presents to ED via ambulance w/ c/o fall post syncopal episode causing R shoulder injury w/ deformity. Pt was found by his wife. Pt had reported being weak d/t having had diarrhea for most of the day yesterday. . Pt placed into cervical collar for c/o neck pain.

## 2025-07-30 ENCOUNTER — HOSPITAL ENCOUNTER (INPATIENT)
Facility: HOSPITAL | Age: 85
LOS: 3 days | Discharge: HOME | DRG: 392 | End: 2025-08-02
Attending: EMERGENCY MEDICINE | Admitting: INTERNAL MEDICINE
Payer: MEDICARE

## 2025-07-30 ENCOUNTER — APPOINTMENT (OUTPATIENT)
Facility: HOSPITAL | Age: 85
DRG: 392 | End: 2025-07-30
Payer: MEDICARE

## 2025-07-30 DIAGNOSIS — R42 LIGHTHEADEDNESS: ICD-10-CM

## 2025-07-30 DIAGNOSIS — R19.7 DIARRHEA, UNSPECIFIED TYPE: Primary | ICD-10-CM

## 2025-07-30 LAB
ALBUMIN SERPL-MCNC: 3.8 G/DL (ref 3.5–5.2)
ALBUMIN/GLOB SERPL: 1.3 (ref 1.1–2.2)
ALP SERPL-CCNC: 88 U/L (ref 40–129)
ALT SERPL-CCNC: 27 U/L (ref 10–50)
ANION GAP SERPL CALC-SCNC: 11 MMOL/L (ref 2–14)
APPEARANCE UR: CLEAR
AST SERPL-CCNC: 24 U/L (ref 10–50)
BACTERIA URNS QL MICRO: NEGATIVE /HPF
BASOPHILS # BLD: 0.02 K/UL (ref 0–0.1)
BASOPHILS NFR BLD: 0.2 % (ref 0–1)
BILIRUB SERPL-MCNC: 0.4 MG/DL (ref 0–1.2)
BILIRUB UR QL: NEGATIVE
BUN SERPL-MCNC: 15 MG/DL (ref 8–23)
BUN/CREAT SERPL: 16 (ref 12–20)
CALCIUM SERPL-MCNC: 9.3 MG/DL (ref 8.8–10.2)
CHLORIDE SERPL-SCNC: 103 MMOL/L (ref 98–107)
CO2 SERPL-SCNC: 24 MMOL/L (ref 20–29)
COLOR UR: NORMAL
CREAT SERPL-MCNC: 0.94 MG/DL (ref 0.7–1.2)
D DIMER PPP FEU-MCNC: 0.47 MG/L FEU (ref 0–0.65)
DIFFERENTIAL METHOD BLD: NORMAL
EOSINOPHIL # BLD: 0.11 K/UL (ref 0–0.4)
EOSINOPHIL NFR BLD: 1.3 % (ref 0–7)
EPITH CASTS URNS QL MICRO: NORMAL /LPF
ERYTHROCYTE [DISTWIDTH] IN BLOOD BY AUTOMATED COUNT: 14.4 % (ref 11.5–14.5)
GLOBULIN SER CALC-MCNC: 2.8 G/DL (ref 2–4)
GLUCOSE SERPL-MCNC: 98 MG/DL (ref 65–100)
GLUCOSE UR STRIP.AUTO-MCNC: NEGATIVE MG/DL
HCT VFR BLD AUTO: 44.3 % (ref 36.6–50.3)
HGB BLD-MCNC: 14.8 G/DL (ref 12.1–17)
HGB UR QL STRIP: NEGATIVE
HYALINE CASTS URNS QL MICRO: NORMAL /LPF (ref 0–2)
IMM GRANULOCYTES # BLD AUTO: 0.02 K/UL (ref 0–0.04)
IMM GRANULOCYTES NFR BLD AUTO: 0.2 % (ref 0–0.5)
KETONES UR QL STRIP.AUTO: NEGATIVE MG/DL
LEUKOCYTE ESTERASE UR QL STRIP.AUTO: NEGATIVE
LYMPHOCYTES # BLD: 1.8 K/UL (ref 0.8–3.5)
LYMPHOCYTES NFR BLD: 21.7 % (ref 12–49)
MAGNESIUM SERPL-MCNC: 2.1 MG/DL (ref 1.6–2.4)
MCH RBC QN AUTO: 29.9 PG (ref 26–34)
MCHC RBC AUTO-ENTMCNC: 33.4 G/DL (ref 30–36.5)
MCV RBC AUTO: 89.5 FL (ref 80–99)
MONOCYTES # BLD: 0.72 K/UL (ref 0–1)
MONOCYTES NFR BLD: 8.7 % (ref 5–13)
NEUTS SEG # BLD: 5.64 K/UL (ref 1.8–8)
NEUTS SEG NFR BLD: 67.9 % (ref 32–75)
NITRITE UR QL STRIP.AUTO: NEGATIVE
NRBC # BLD: 0 K/UL (ref 0–0.01)
NRBC BLD-RTO: 0 PER 100 WBC
PH UR STRIP: 7 (ref 5–8)
PLATELET # BLD AUTO: 212 K/UL (ref 150–400)
PMV BLD AUTO: 9.2 FL (ref 8.9–12.9)
POTASSIUM SERPL-SCNC: 4.1 MMOL/L (ref 3.5–5.1)
PROCALCITONIN SERPL-MCNC: 0.04 NG/ML
PROT SERPL-MCNC: 6.6 G/DL (ref 6.4–8.3)
PROT UR STRIP-MCNC: NEGATIVE MG/DL
RBC # BLD AUTO: 4.95 M/UL (ref 4.1–5.7)
RBC #/AREA URNS HPF: NORMAL /HPF (ref 0–5)
SODIUM SERPL-SCNC: 138 MMOL/L (ref 136–145)
SP GR UR REFRACTOMETRY: 1.01 (ref 1–1.03)
SPECIMEN HOLD: NORMAL
TROPONIN T SERPL HS-MCNC: 14.1 NG/L (ref 0–22)
UROBILINOGEN UR QL STRIP.AUTO: 0.2 EU/DL (ref 0.2–1)
WBC # BLD AUTO: 8.3 K/UL (ref 4.1–11.1)
WBC URNS QL MICRO: NORMAL /HPF (ref 0–4)

## 2025-07-30 PROCEDURE — 70450 CT HEAD/BRAIN W/O DYE: CPT

## 2025-07-30 PROCEDURE — 99285 EMERGENCY DEPT VISIT HI MDM: CPT

## 2025-07-30 PROCEDURE — 81001 URINALYSIS AUTO W/SCOPE: CPT

## 2025-07-30 PROCEDURE — 84484 ASSAY OF TROPONIN QUANT: CPT

## 2025-07-30 PROCEDURE — 36415 COLL VENOUS BLD VENIPUNCTURE: CPT

## 2025-07-30 PROCEDURE — 85379 FIBRIN DEGRADATION QUANT: CPT

## 2025-07-30 PROCEDURE — 6360000002 HC RX W HCPCS: Performed by: INTERNAL MEDICINE

## 2025-07-30 PROCEDURE — 94761 N-INVAS EAR/PLS OXIMETRY MLT: CPT

## 2025-07-30 PROCEDURE — 74176 CT ABD & PELVIS W/O CONTRAST: CPT

## 2025-07-30 PROCEDURE — 85025 COMPLETE CBC W/AUTO DIFF WBC: CPT

## 2025-07-30 PROCEDURE — 2580000003 HC RX 258: Performed by: NURSE PRACTITIONER

## 2025-07-30 PROCEDURE — 83735 ASSAY OF MAGNESIUM: CPT

## 2025-07-30 PROCEDURE — 6370000000 HC RX 637 (ALT 250 FOR IP): Performed by: INTERNAL MEDICINE

## 2025-07-30 PROCEDURE — 84145 PROCALCITONIN (PCT): CPT

## 2025-07-30 PROCEDURE — 2500000003 HC RX 250 WO HCPCS: Performed by: INTERNAL MEDICINE

## 2025-07-30 PROCEDURE — 1100000000 HC RM PRIVATE

## 2025-07-30 PROCEDURE — 80053 COMPREHEN METABOLIC PANEL: CPT

## 2025-07-30 RX ORDER — MAGNESIUM SULFATE IN WATER 40 MG/ML
2000 INJECTION, SOLUTION INTRAVENOUS PRN
Status: DISCONTINUED | OUTPATIENT
Start: 2025-07-30 | End: 2025-08-02 | Stop reason: HOSPADM

## 2025-07-30 RX ORDER — ONDANSETRON 4 MG/1
4 TABLET, ORALLY DISINTEGRATING ORAL EVERY 8 HOURS PRN
Status: DISCONTINUED | OUTPATIENT
Start: 2025-07-30 | End: 2025-08-02 | Stop reason: HOSPADM

## 2025-07-30 RX ORDER — ACETAMINOPHEN 650 MG/1
650 SUPPOSITORY RECTAL EVERY 6 HOURS PRN
Status: DISCONTINUED | OUTPATIENT
Start: 2025-07-30 | End: 2025-08-02 | Stop reason: HOSPADM

## 2025-07-30 RX ORDER — ALPRAZOLAM 0.5 MG
0.25 TABLET ORAL 3 TIMES DAILY PRN
Status: DISCONTINUED | OUTPATIENT
Start: 2025-07-30 | End: 2025-07-31

## 2025-07-30 RX ORDER — TADALAFIL 5 MG/1
5 TABLET ORAL EVERY MORNING
Status: DISCONTINUED | OUTPATIENT
Start: 2025-07-31 | End: 2025-08-02 | Stop reason: HOSPADM

## 2025-07-30 RX ORDER — ATORVASTATIN CALCIUM 20 MG/1
40 TABLET, FILM COATED ORAL NIGHTLY
Status: DISCONTINUED | OUTPATIENT
Start: 2025-07-30 | End: 2025-08-02

## 2025-07-30 RX ORDER — SODIUM CHLORIDE 0.9 % (FLUSH) 0.9 %
5-40 SYRINGE (ML) INJECTION EVERY 12 HOURS SCHEDULED
Status: DISCONTINUED | OUTPATIENT
Start: 2025-07-30 | End: 2025-08-02 | Stop reason: HOSPADM

## 2025-07-30 RX ORDER — ONDANSETRON 2 MG/ML
4 INJECTION INTRAMUSCULAR; INTRAVENOUS EVERY 6 HOURS PRN
Status: DISCONTINUED | OUTPATIENT
Start: 2025-07-30 | End: 2025-08-02 | Stop reason: HOSPADM

## 2025-07-30 RX ORDER — ASPIRIN 81 MG/1
81 TABLET ORAL DAILY
Status: DISCONTINUED | OUTPATIENT
Start: 2025-07-31 | End: 2025-08-02 | Stop reason: HOSPADM

## 2025-07-30 RX ORDER — POLYETHYLENE GLYCOL 3350 17 G/17G
17 POWDER, FOR SOLUTION ORAL DAILY PRN
Status: DISCONTINUED | OUTPATIENT
Start: 2025-07-30 | End: 2025-08-02 | Stop reason: HOSPADM

## 2025-07-30 RX ORDER — ACETAMINOPHEN 325 MG/1
650 TABLET ORAL EVERY 6 HOURS PRN
Status: DISCONTINUED | OUTPATIENT
Start: 2025-07-30 | End: 2025-08-02 | Stop reason: HOSPADM

## 2025-07-30 RX ORDER — SODIUM CHLORIDE 9 MG/ML
INJECTION, SOLUTION INTRAVENOUS PRN
Status: DISCONTINUED | OUTPATIENT
Start: 2025-07-30 | End: 2025-08-02 | Stop reason: HOSPADM

## 2025-07-30 RX ORDER — 0.9 % SODIUM CHLORIDE 0.9 %
1000 INTRAVENOUS SOLUTION INTRAVENOUS ONCE
Status: COMPLETED | OUTPATIENT
Start: 2025-07-30 | End: 2025-07-30

## 2025-07-30 RX ORDER — POTASSIUM CHLORIDE 750 MG/1
40 TABLET, EXTENDED RELEASE ORAL PRN
Status: DISCONTINUED | OUTPATIENT
Start: 2025-07-30 | End: 2025-08-02 | Stop reason: HOSPADM

## 2025-07-30 RX ORDER — ENOXAPARIN SODIUM 100 MG/ML
40 INJECTION SUBCUTANEOUS DAILY
Status: DISCONTINUED | OUTPATIENT
Start: 2025-07-30 | End: 2025-08-02 | Stop reason: HOSPADM

## 2025-07-30 RX ORDER — SODIUM CHLORIDE 0.9 % (FLUSH) 0.9 %
5-40 SYRINGE (ML) INJECTION PRN
Status: DISCONTINUED | OUTPATIENT
Start: 2025-07-30 | End: 2025-08-02 | Stop reason: HOSPADM

## 2025-07-30 RX ORDER — POTASSIUM CHLORIDE 7.45 MG/ML
10 INJECTION INTRAVENOUS PRN
Status: DISCONTINUED | OUTPATIENT
Start: 2025-07-30 | End: 2025-08-02 | Stop reason: HOSPADM

## 2025-07-30 RX ADMIN — SODIUM CHLORIDE 1000 ML: 0.9 INJECTION, SOLUTION INTRAVENOUS at 15:04

## 2025-07-30 RX ADMIN — ATORVASTATIN CALCIUM 40 MG: 20 TABLET, FILM COATED ORAL at 20:51

## 2025-07-30 RX ADMIN — ACETAMINOPHEN 650 MG: 325 TABLET ORAL at 17:20

## 2025-07-30 RX ADMIN — ALPRAZOLAM 0.25 MG: 0.5 TABLET ORAL at 20:51

## 2025-07-30 RX ADMIN — ENOXAPARIN SODIUM 40 MG: 100 INJECTION SUBCUTANEOUS at 17:20

## 2025-07-30 RX ADMIN — SODIUM CHLORIDE, PRESERVATIVE FREE 5 ML: 5 INJECTION INTRAVENOUS at 20:52

## 2025-07-30 ASSESSMENT — PAIN SCALES - GENERAL: PAINLEVEL_OUTOF10: 4

## 2025-07-30 ASSESSMENT — ENCOUNTER SYMPTOMS
COLOR CHANGE: 0
VOMITING: 0
ABDOMINAL DISTENTION: 0
SHORTNESS OF BREATH: 0
SINUS PRESSURE: 0
DIARRHEA: 1
ABDOMINAL PAIN: 0
SINUS PAIN: 0
COUGH: 0
EYE PAIN: 0
NAUSEA: 0
EYE REDNESS: 0

## 2025-07-30 ASSESSMENT — PAIN - FUNCTIONAL ASSESSMENT: PAIN_FUNCTIONAL_ASSESSMENT: NONE - DENIES PAIN

## 2025-07-30 ASSESSMENT — PAIN DESCRIPTION - LOCATION: LOCATION: HEAD

## 2025-07-30 ASSESSMENT — PAIN DESCRIPTION - DESCRIPTORS: DESCRIPTORS: ACHING

## 2025-07-30 NOTE — PLAN OF CARE
Problem: Chronic Conditions and Co-morbidities  Goal: Patient's chronic conditions and co-morbidity symptoms are monitored and maintained or improved  Outcome: Progressing  Flowsheets (Taken 7/30/2025 1946)  Care Plan - Patient's Chronic Conditions and Co-Morbidity Symptoms are Monitored and Maintained or Improved:   Monitor and assess patient's chronic conditions and comorbid symptoms for stability, deterioration, or improvement   Collaborate with multidisciplinary team to address chronic and comorbid conditions and prevent exacerbation or deterioration

## 2025-07-30 NOTE — ED PROVIDER NOTES
[facility] EMERGENCY DEPARTMENT  EMERGENCY DEPARTMENT ENCOUNTER      Pt Name: Gilberto Noe Jr  MRN: [id number]  Birthdate 1940  Date of evaluation: 7/30/2025  Provider: GIANLUCA Nogueira NP      HISTORY OF PRESENT ILLNESS      Chief complaint: diarrhea and lightheadedness    Past Medical History:  No date: Anxiety  No date: Arthritis  No date: BPH (benign prostatic hyperplasia)  2020: Cerebral artery occlusion with cerebral infarction (HCC)  No date: Chronic pain  1988: Coronary artery disease  No date: GERD (gastroesophageal reflux disease)  No date: HTN (hypertension), benign  No date: Retinal tear, left  No date: Rotator cuff tear      Comment:  RIGHT   Past Surgical History:  1955: ADENOIDECTOMY  1955: APPENDECTOMY  No date: COLONOSCOPY  9/25/2024: COLONOSCOPY; N/A      Comment:  COLONOSCOPY DIAGNOSTIC performed by Andrew Pardo MD at                [facility] ENDOSCOPY  9/25/2024: COLONOSCOPY; N/A      Comment:  COLONOSCOPY POLYPECTOMY SNARE/BIOPSY performed by Andrew Pardo MD at [facility] ENDOSCOPY  9/25/2024: COLONOSCOPY      Comment:  COLONOSCOPY CONTROL HEMORRHAGE performed by Andrew Pardo MD at [facility] ENDOSCOPY  1986: CORONARY ARTERY BYPASS GRAFT  5/31/2024: HAND SURGERY; Left      Comment:  LEFT MIDDLE FINGER A1 PULLEY RELEASE (MAC WITH LOCAL)                performed by Tabby Lomeli MD at [facility] AMBULATORY OR  09/17/2021: ORTHOPEDIC SURGERY; Right      Comment:  Middle finger trigger release  No date: PACEMAKER INSERTION  No date: POLYPECTOMY  No date: SPINAL CORD STIMULATOR IMPLANT  1955: TONSILLECTOMY  1990: VASECTOMY      The history is provided by the patient.           Nursing Notes were reviewed.    REVIEW OF SYSTEMS         Review of Systems   Constitutional:  Negative for appetite change, chills, diaphoresis and fatigue.   HENT:  Negative for congestion, sinus pressure and sinus pain.    Eyes:  Negative for pain and redness.   Respiratory:

## 2025-07-30 NOTE — DISCHARGE INSTRUCTIONS
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 Your discharging Hospitalist is Dom Bautista MD. You were admitted for evaluation and treatment of the above. You had a colonoscopy done which looked normal-- however biopsies were taken of the colon to try to obtain a diagnosis of your diarrhea.  It is possible you have a condition called microscopic colitis.  This can be secondary to medications so you can try going off your Prilosec/omeprazole to see if this helps.  Follow up with Dr. Pardo (GI) for your biopsy results.      MEDICATIONS:  STOP taking Prilosec (omeprazole)  It is important that you take the medication exactly as they are prescribed.   Keep your medication in the bottles provided by the pharmacist and keep a list of the medication names, dosages, and times to be taken in your wallet.   Do not take other medications without consulting your doctor.             If you experience any of the following symptoms then please call your primary care physician or return to the emergency room if you cannot get hold of your doctor:  Fever, chills, nausea, vomiting, diarrhea, change in mentation, falling, bleeding, shortness of breath    Follow Up:  Please call the below provider to arrange hospital follow up appointment      Noel Esparza MD  [address] [id number] [phone]    Follow up      Andrew Pardo MD  [address] [facility] [address] [phone]    Follow up        For questions regarding your Hospitalization or to contact the Hospital Medicine team, please call [phone]       Information obtained by :  I understand that if any problems occur once I am at home I am to

## 2025-07-30 NOTE — H&P
[facility]  [address] [place] [address] [phone]    Admission History and Physical      NAME:  Gilberto Noe Jr   :   1940   MRN:  [id number]     PCP:  Noel Esparza MD     Date/Time of service:  2025  4:56 PM        Subjective:     CHIEF COMPLAINT: intractable diarrhea, light headedness     HISTORY OF PRESENT ILLNESS:     Mr. Hasmukh Teague is a 85 y.o.  male with a past medical history of CVA with left upper extremity weakness, coronary artery disease, hypertension, BPH, arthritis and anxiety who is admitted with intractable diarrhea and lightheadedness.  Mr. Hasmukh Teague has been having diarrhea for the past 3 months however he states that his diarrhea has recently worsened.  He states he is now having 7-8 episodes daily.  He denies any blood in his bowel movements.  He denies any associated abdominal pain or nausea vomiting.  He denies any fevers or chills.  He states anything that anything that he eats seems to go right through him.  He does state that he is having some dysphagia with liquids.  He has been seeing GI outpatient for this and is supposed to have endoscopies on .  He denies any associated chest pain or dyspnea but states that he has had lightheadedness.  Of note, he also states that he had a syncopal episode about 8 weeks ago at which time he injured his right shoulder.  He states that he blacked out in the bathroom and cannot recall much of the event.  He states he has not seen his heart doctor since he had syncope and has not been worked up for this.  He denies any history of seizures.    Allergies   Allergen Reactions    Iodinated Contrast Media Shortness Of Breath     Lung aspiration  Short of breath    Iodine Anaphylaxis, Rash and Shortness Of Breath       Prior to Admission medications    Medication Sig Start Date End Date Taking? Authorizing Provider   fluticasone (FLONASE) 50 MCG/ACT nasal spray  24   Provider,

## 2025-07-30 NOTE — ED TRIAGE NOTES
Pt arrives to ED with complaints of diarrhea 5 times a day x3 months.  Pt notes \"food passing through him as soon as he eats it\".  Pt denies black stools. Pt has seen GI and scheduled for endoscopy and colonoscopy in sept.  Pt told to come to the ED.  Pt reports \"feeling like passing out when moving\".

## 2025-07-31 ENCOUNTER — HOSPITAL ENCOUNTER (INPATIENT)
Facility: HOSPITAL | Age: 85
Discharge: HOME | DRG: 392 | End: 2025-08-02
Attending: INTERNAL MEDICINE
Payer: MEDICARE

## 2025-07-31 VITALS
DIASTOLIC BLOOD PRESSURE: 71 MMHG | SYSTOLIC BLOOD PRESSURE: 140 MMHG | BODY MASS INDEX: 21.19 KG/M2 | HEIGHT: 67 IN | WEIGHT: 135 LBS

## 2025-07-31 LAB
ANION GAP SERPL CALC-SCNC: 11 MMOL/L (ref 2–14)
BUN SERPL-MCNC: 15 MG/DL (ref 8–23)
BUN/CREAT SERPL: 14 (ref 12–20)
CALCIUM SERPL-MCNC: 9.2 MG/DL (ref 8.8–10.2)
CHLORIDE SERPL-SCNC: 104 MMOL/L (ref 98–107)
CO2 SERPL-SCNC: 25 MMOL/L (ref 20–29)
CREAT SERPL-MCNC: 1.02 MG/DL (ref 0.7–1.2)
ECHO AO ASC DIAM: 3.1 CM
ECHO AO ASCENDING AORTA INDEX: 1.81 CM/M2
ECHO AV AREA PEAK VELOCITY: 2.5 CM2
ECHO AV AREA VTI: 2.6 CM2
ECHO AV AREA/BSA PEAK VELOCITY: 1.5 CM2/M2
ECHO AV AREA/BSA VTI: 1.5 CM2/M2
ECHO AV MEAN GRADIENT: 5 MMHG
ECHO AV MEAN VELOCITY: 1 M/S
ECHO AV PEAK GRADIENT: 8 MMHG
ECHO AV PEAK VELOCITY: 1.4 M/S
ECHO AV VELOCITY RATIO: 0.64
ECHO AV VTI: 32.7 CM
ECHO BSA: 1.7 M2
ECHO EST RA PRESSURE: 3 MMHG
ECHO LA DIAMETER INDEX: 1.64 CM/M2
ECHO LA DIAMETER: 2.8 CM
ECHO LA VOL A-L A2C: 49 ML (ref 18–58)
ECHO LA VOL A-L A4C: 51 ML (ref 18–58)
ECHO LA VOL BP: 49 ML (ref 18–58)
ECHO LA VOL MOD A2C: 47 ML (ref 18–58)
ECHO LA VOL MOD A4C: 48 ML (ref 18–58)
ECHO LA VOL/BSA BIPLANE: 29 ML/M2 (ref 16–34)
ECHO LA VOLUME AREA LENGTH: 53 ML
ECHO LA VOLUME INDEX A-L A2C: 29 ML/M2 (ref 16–34)
ECHO LA VOLUME INDEX A-L A4C: 30 ML/M2 (ref 16–34)
ECHO LA VOLUME INDEX AREA LENGTH: 31 ML/M2 (ref 16–34)
ECHO LA VOLUME INDEX MOD A2C: 27 ML/M2 (ref 16–34)
ECHO LA VOLUME INDEX MOD A4C: 28 ML/M2 (ref 16–34)
ECHO LV E' LATERAL VELOCITY: 8.74 CM/S
ECHO LV E' SEPTAL VELOCITY: 5.65 CM/S
ECHO LV EDV A2C: 84 ML
ECHO LV EDV A4C: 96 ML
ECHO LV EDV BP: 92 ML (ref 67–155)
ECHO LV EDV INDEX A4C: 56 ML/M2
ECHO LV EDV INDEX BP: 54 ML/M2
ECHO LV EDV NDEX A2C: 49 ML/M2
ECHO LV EF PHYSICIAN: 55 %
ECHO LV EJECTION FRACTION A2C: 54 %
ECHO LV EJECTION FRACTION A4C: 52 %
ECHO LV ESV A2C: 39 ML
ECHO LV ESV A4C: 46 ML
ECHO LV ESV BP: 42 ML (ref 22–58)
ECHO LV ESV INDEX A2C: 23 ML/M2
ECHO LV ESV INDEX A4C: 27 ML/M2
ECHO LV ESV INDEX BP: 25 ML/M2
ECHO LV FRACTIONAL SHORTENING: 29 % (ref 28–44)
ECHO LV INTERNAL DIMENSION DIASTOLE INDEX: 3.22 CM/M2
ECHO LV INTERNAL DIMENSION DIASTOLIC: 5.5 CM (ref 4.2–5.9)
ECHO LV INTERNAL DIMENSION SYSTOLIC INDEX: 2.28 CM/M2
ECHO LV INTERNAL DIMENSION SYSTOLIC: 3.9 CM
ECHO LV IVSD: 0.8 CM (ref 0.6–1)
ECHO LV MASS 2D: 172.7 G (ref 88–224)
ECHO LV MASS INDEX 2D: 101 G/M2 (ref 49–115)
ECHO LV POSTERIOR WALL DIASTOLIC: 0.9 CM (ref 0.6–1)
ECHO LV RELATIVE WALL THICKNESS RATIO: 0.33
ECHO LVOT AREA: 3.8 CM2
ECHO LVOT AV VTI INDEX: 0.67
ECHO LVOT DIAM: 2.2 CM
ECHO LVOT MEAN GRADIENT: 2 MMHG
ECHO LVOT PEAK GRADIENT: 4 MMHG
ECHO LVOT PEAK VELOCITY: 0.9 M/S
ECHO LVOT STROKE VOLUME INDEX: 48.7 ML/M2
ECHO LVOT SV: 83.2 ML
ECHO LVOT VTI: 21.9 CM
ECHO MV A VELOCITY: 1.01 M/S
ECHO MV E DECELERATION TIME (DT): 297.7 MS
ECHO MV E VELOCITY: 0.58 M/S
ECHO MV E/A RATIO: 0.57
ECHO MV E/E' LATERAL: 6.64
ECHO MV E/E' RATIO (AVERAGED): 8.45
ECHO MV E/E' SEPTAL: 10.27
ECHO MV REGURGITANT PEAK GRADIENT: 85 MMHG
ECHO MV REGURGITANT PEAK VELOCITY: 4.6 M/S
ECHO PULMONARY ARTERY END DIASTOLIC PRESSURE: 7 MMHG
ECHO PV MAX VELOCITY: 0.8 M/S
ECHO PV PEAK GRADIENT: 3 MMHG
ECHO PV REGURGITANT MAX VELOCITY: 1.3 M/S
ECHO RIGHT VENTRICULAR SYSTOLIC PRESSURE (RVSP): 23 MMHG
ECHO RV FREE WALL PEAK S': 7.4 CM/S
ECHO RV INTERNAL DIMENSION: 3.2 CM
ECHO RV TAPSE: 2.4 CM (ref 1.7–?)
ECHO RVOT MEAN GRADIENT: 1 MMHG
ECHO RVOT PEAK GRADIENT: 1 MMHG
ECHO RVOT PEAK VELOCITY: 0.6 M/S
ECHO RVOT VTI: 13.2 CM
ECHO TV REGURGITANT MAX VELOCITY: 2.22 M/S
ECHO TV REGURGITANT PEAK GRADIENT: 20 MMHG
GLUCOSE SERPL-MCNC: 84 MG/DL (ref 65–100)
POTASSIUM SERPL-SCNC: 4.2 MMOL/L (ref 3.5–5.1)
SODIUM SERPL-SCNC: 140 MMOL/L (ref 136–145)

## 2025-07-31 PROCEDURE — 94761 N-INVAS EAR/PLS OXIMETRY MLT: CPT

## 2025-07-31 PROCEDURE — 36415 COLL VENOUS BLD VENIPUNCTURE: CPT

## 2025-07-31 PROCEDURE — 97165 OT EVAL LOW COMPLEX 30 MIN: CPT

## 2025-07-31 PROCEDURE — APPSS45 APP SPLIT SHARED TIME 31-45 MINUTES

## 2025-07-31 PROCEDURE — 80048 BASIC METABOLIC PNL TOTAL CA: CPT

## 2025-07-31 PROCEDURE — 6360000002 HC RX W HCPCS: Performed by: INTERNAL MEDICINE

## 2025-07-31 PROCEDURE — 6370000000 HC RX 637 (ALT 250 FOR IP): Performed by: INTERNAL MEDICINE

## 2025-07-31 PROCEDURE — 6370000000 HC RX 637 (ALT 250 FOR IP): Performed by: HOSPITALIST

## 2025-07-31 PROCEDURE — 93306 TTE W/DOPPLER COMPLETE: CPT | Performed by: INTERNAL MEDICINE

## 2025-07-31 PROCEDURE — 99223 1ST HOSP IP/OBS HIGH 75: CPT | Performed by: INTERNAL MEDICINE

## 2025-07-31 PROCEDURE — 1100000000 HC RM PRIVATE

## 2025-07-31 PROCEDURE — 6370000000 HC RX 637 (ALT 250 FOR IP): Performed by: NURSE PRACTITIONER

## 2025-07-31 PROCEDURE — 2500000003 HC RX 250 WO HCPCS: Performed by: INTERNAL MEDICINE

## 2025-07-31 PROCEDURE — 6370000000 HC RX 637 (ALT 250 FOR IP)

## 2025-07-31 PROCEDURE — 92610 EVALUATE SWALLOWING FUNCTION: CPT

## 2025-07-31 PROCEDURE — 93306 TTE W/DOPPLER COMPLETE: CPT

## 2025-07-31 PROCEDURE — 97161 PT EVAL LOW COMPLEX 20 MIN: CPT

## 2025-07-31 RX ORDER — ALPRAZOLAM 0.5 MG
0.5 TABLET ORAL 3 TIMES DAILY PRN
Status: DISCONTINUED | OUTPATIENT
Start: 2025-07-31 | End: 2025-08-02 | Stop reason: HOSPADM

## 2025-07-31 RX ORDER — DONEPEZIL HYDROCHLORIDE 5 MG/1
5 TABLET, FILM COATED ORAL NIGHTLY
Status: DISCONTINUED | OUTPATIENT
Start: 2025-07-31 | End: 2025-08-02 | Stop reason: HOSPADM

## 2025-07-31 RX ORDER — DONEPEZIL HYDROCHLORIDE 5 MG/1
5 TABLET, FILM COATED ORAL NIGHTLY
COMMUNITY

## 2025-07-31 RX ORDER — TRAZODONE HYDROCHLORIDE 50 MG/1
50 TABLET ORAL NIGHTLY
Status: DISCONTINUED | OUTPATIENT
Start: 2025-07-31 | End: 2025-08-02 | Stop reason: HOSPADM

## 2025-07-31 RX ORDER — POLYETHYLENE GLYCOL 3350 17 G/17G
119 POWDER, FOR SOLUTION ORAL EVERY 6 HOURS
Status: COMPLETED | OUTPATIENT
Start: 2025-07-31 | End: 2025-07-31

## 2025-07-31 RX ADMIN — ASPIRIN 81 MG: 81 TABLET, COATED ORAL at 07:58

## 2025-07-31 RX ADMIN — POLYETHYLENE GLYCOL 3350 119 G: 17 POWDER, FOR SOLUTION ORAL at 13:06

## 2025-07-31 RX ADMIN — ACETAMINOPHEN 650 MG: 325 TABLET ORAL at 16:54

## 2025-07-31 RX ADMIN — ENOXAPARIN SODIUM 40 MG: 100 INJECTION SUBCUTANEOUS at 07:57

## 2025-07-31 RX ADMIN — DONEPEZIL HYDROCHLORIDE 5 MG: 5 TABLET, FILM COATED ORAL at 21:36

## 2025-07-31 RX ADMIN — ATORVASTATIN CALCIUM 40 MG: 20 TABLET, FILM COATED ORAL at 21:36

## 2025-07-31 RX ADMIN — SODIUM CHLORIDE, PRESERVATIVE FREE 10 ML: 5 INJECTION INTRAVENOUS at 21:37

## 2025-07-31 RX ADMIN — TRAZODONE HYDROCHLORIDE 50 MG: 50 TABLET ORAL at 21:36

## 2025-07-31 RX ADMIN — POLYETHYLENE GLYCOL 3350 119 G: 17 POWDER, FOR SOLUTION ORAL at 18:09

## 2025-07-31 RX ADMIN — ALPRAZOLAM 0.5 MG: 0.5 TABLET ORAL at 21:36

## 2025-07-31 RX ADMIN — SODIUM CHLORIDE, PRESERVATIVE FREE 10 ML: 5 INJECTION INTRAVENOUS at 07:58

## 2025-07-31 RX ADMIN — ONDANSETRON 4 MG: 4 TABLET, ORALLY DISINTEGRATING ORAL at 02:35

## 2025-07-31 ASSESSMENT — PAIN SCALES - GENERAL
PAINLEVEL_OUTOF10: 0
PAINLEVEL_OUTOF10: 2

## 2025-07-31 ASSESSMENT — PAIN DESCRIPTION - LOCATION: LOCATION: HEAD

## 2025-07-31 ASSESSMENT — PAIN DESCRIPTION - ORIENTATION: ORIENTATION: ANTERIOR

## 2025-07-31 ASSESSMENT — PAIN DESCRIPTION - DESCRIPTORS: DESCRIPTORS: DULL

## 2025-07-31 NOTE — CARE COORDINATION
CASE MANAGEMENT LOW RISK ADMISSION NOTE  7/31/2025  8:11 AM      ICD-10-CM    1. Diarrhea, unspecified type  R19.7       2. Lightheadedness  R42 Echo (TTE) complete (PRN contrast/bubble/strain/3D)     Echo (TTE) complete (PRN contrast/bubble/strain/3D)          General Risk Score: 5   Values used to calculate this score:    Points  Metrics       1        Age: 85       1        Hospital Admissions: 1       3        ED Visits: 4       0        Has Chronic Obstructive Pulmonary Disease: No       0        Has Diabetes Excluding Gestational Diabetes: No       0        Has Congestive Heart Failure: No       0        Has Liver Disease: No       0        Has Depression: No       0        Current PCP: Noel Esparza MD       0        Has Medicaid: No      Admit Date:7/30/2025  2:10 PM    Admission Status: inpt    CM reviewed EMR. No CM consults received at the time of this writing and no CM needs indicated at this time.  Providers, if needs arise, please consult case management.   Ally

## 2025-07-31 NOTE — PROGRESS NOTES
PHYSICAL THERAPY EVALUATION/DISCHARGE    Patient: Gilberto Noe Jr (85 y.o. male)  Date: 7/31/2025  Primary Diagnosis: Lightheadedness [R42]  Intractable diarrhea [R19.7]  Diarrhea, unspecified type [R19.7]       Precautions:              ASSESSMENT AND RECOMMENDATIONS:  Based on the objective data below, the patient the patient presents with no further acute or post-acute physical therapy needs, s/p admission for intractable diarrhea x 8 weeks with generalized weakness. Patient is active and independent, but has used a cane in the past several weeks due to generalized weakness. He suffered a syncopal event ~8 weeks ago with resultant separation of R AC joint that has since mostly healed. He denies additional falls. Patient is currently mobilizing at baseline level, completing all components with independence unsupported. Orthostatics are negative. Patient plans to return home with spouse and is safe to do so once medically cleared. Will complete PT order. Please consult if there is a change in status.       Functional Outcome Measure:  The patient scored 24 on the [facility] outcome measure which is indicative of reduced odds of requiring post acute SNF/IPR upon d/c .      Further skilled acute physical therapy is not indicated at this time.       07/31/25 0939 07/31/25 0941 07/31/25 0943   Vitals   Pulse 60 60 60   BP (!) 159/73 (!) 146/79 (!) 152/81   MAP (Calculated) 102 101 105   BP Location Right upper arm Right upper arm Right upper arm   BP Method Automatic Automatic Automatic   Patient Position Supine Sitting Standing          PLAN :  Recommendation for discharge: (in order for the patient to meet his/her long term goals):   No skilled physical therapy    Other factors to consider for discharge: no additional factors    IF patient discharges home will need the following DME: none       SUBJECTIVE:   Patient stated “My wife and I like to exercise.”    OBJECTIVE DATA SUMMARY:     Past Medical History:

## 2025-07-31 NOTE — PROGRESS NOTES
OCCUPATIONAL THERAPY EVALUATION/DISCHARGE  Patient: Gilberto Noe Jr (85 y.o. male)  Date: 7/31/2025  Primary Diagnosis: Lightheadedness [R42]  Intractable diarrhea [R19.7]  Diarrhea, unspecified type [R19.7]         Precautions: fall    ASSESSMENT :  Note patient, fully independent and active/ exercising at baseline, with history of CVA + mild residual LUE weakness (still functional), admitted for progressive diarrhea x months with 1 GLF/ syncopal episode ~2 months ago, which resulted in R AC joint separation (reports pain and AROM have been improving).  He presents today at fully independent functional baseline with functional AROM/ strength/ coordination for ADLs and intact balance walking within room without AD.  Orthostatic vitals stable (see below) with no dizziness.  He has no current mobility or ADL concerns and does not require further OT at this time.    Functional Outcome Measure:  The patient scored 0% on the AM-PAC ADL outcome measure which is indicative of no ADL impairment.      Further skilled acute occupational therapy is not indicated at this time.     PLAN :    Recommendation for discharge: (in order for the patient to meet his/her long term goals):   No skilled occupational therapy       SUBJECTIVE:   Patient stated, “I feel okay.”    OBJECTIVE DATA SUMMARY:     Past Medical History:   Diagnosis Date    Anxiety     Arthritis     BPH (benign prostatic hyperplasia)     Cerebral artery occlusion with cerebral infarction (HCC) 2020    Chronic pain     Coronary artery disease 1988    GERD (gastroesophageal reflux disease)     HTN (hypertension), benign     Retinal tear, left     Rotator cuff tear     RIGHT      Past Surgical History:   Procedure Laterality Date    ADENOIDECTOMY  1955    APPENDECTOMY  1955    COLONOSCOPY      COLONOSCOPY N/A 9/25/2024    COLONOSCOPY DIAGNOSTIC performed by Andrew Pardo MD at [facility] ENDOSCOPY    COLONOSCOPY N/A 9/25/2024    COLONOSCOPY POLYPECTOMY SNARE/BIOPSY

## 2025-07-31 NOTE — ACP (ADVANCE CARE PLANNING)
[facility]                                   Advance Care Planning Note    Name: Gilberto Noe Jr  YOB: 1940  MRN: [id number]  Admission Date: 7/30/2025  2:10 PM    Date of discussion: 7/31/2025    Active Diagnoses:  Intractable diarrhea, possible neuroendocrine tumor  Prior CVA  CAD        These active diagnoses are of sufficient risk that focused discussion on advance care planning is indicated in order to allow the patient to thoughtfully consider personal goals of care, and if situations arise that prevent the ability to personally give input, to ensure appropriate representation of their personal desires for different levels and aggressiveness of care.     Discussion:     Persons present and participating in discussion: Gilberto Noe Jr, Dom Bautista MD    Topics Discussed:  Patient's medical condition and diagnosis   Surrogate decision maker   Patient's current physical function/cognitive function/frailty   Code Status    Overview of Discussion: Patient has discussed his medical wishes with next of kin.  His wife, Rufina Noe, is his medical power of  and remains his chosen surrogate decision maker.  If his wife was unavailable, he would want his adult son Amando Noe to make decisions for him.  He has an advanced directive.        Time Spent:     Total time spent face-to-face in education and discussion: 16 minutes.     Dom Bautista MD  Date of Service:  7/31/2025  2:53 PM

## 2025-07-31 NOTE — CONSULTS
[facility]                       Cardiology Care Note     [x]Initial Encounter     []Follow-up    Patient Name: Gilberto Noe Jr - :1940 - MRN:[id number]  Primary Cardiologist: Dr. Patel  Consulting Cardiologist: Micha Camejo MD     Reason for encounter: Pre-Operative Risk Assessment         HPI:       Gilberto Noe Jr is a 85 y.o. male with PMH significant for CAD, HTN, BPH, CVA (L sided weakness) who was admitted on  with intractable diarrhea and lightheadedness. Per H&P, patient has been having diarrhea for several months but is now having 7-8 episodes daily. About 8 weeks ago, patient reports having a syncopal episode in the bathroom but never got worked up for this event. He was scheduled to have endoscopies completed outpatient but GI is planning to move forward with this given ongoing fatigue and weight loss of 15 pounds over the past 3 months. Cardiology was consulted for risk assessment prior to EGD/colonoscopy.          Assessment and Plan     # Cardiac Risk Assessment   - Patient is considered intermediate risk for procedure, RCRI score 1   - Encourage risk/benefit conversations with patient and care team   - Echo pending   - EKG normal sinus rhythm without ischemic changes     # Hx CAD   - Patient reports having open heart surgery in , no procedures since then   - Follows with Dr. Patel- awaiting records   - Quit smoking 1 year ago     # Hx HTN   - Not currently taking any medications     #Hx HLD   - Continue statin   - Last lipid panel within normal          ____________________________________________________________    Cardiac testing    Telemetry checked - N/A    EKG- normal sinus rhythm       Prior Cardiac Workup Includes:  Echo pending       Past Medical History:  Past Medical History:   Diagnosis Date    Anxiety     Arthritis     BPH (benign prostatic hyperplasia)     Cerebral artery occlusion with  12 - 20      Est, Glom Filt Rate 72 (L) >90 ml/min/1.73m2    Calcium 9.2 8.8 - 10.2 MG/DL   Echo (TTE) complete (PRN contrast/bubble/strain/3D)    Collection Time: 07/31/25  9:13 AM   Result Value Ref Range    IVSd 0.8 0.6 - 1.0 cm    LVIDd 5.5 4.2 - 5.9 cm    LVIDs 3.9 cm    LVOT Diameter 2.2 cm    LVPWd 0.9 0.6 - 1.0 cm    EF BP 54 (A) 55 - 100 %    LV Ejection Fraction A2C 54 %    LV Ejection Fraction A4C 52 %    LV EDV A2C 84 mL    LV EDV A4C 96 mL    LV EDV BP 92 67 - 155 mL    LV ESV A2C 39 mL    LV ESV A4C 46 mL    LV ESV BP 42 22 - 58 mL    LVOT Peak Gradient 4 mmHg    LVOT Mean Gradient 2 mmHg    LVOT SV 83.2 ml    LVOT Peak Velocity 0.9 m/s    LVOT VTI 21.9 cm    RVIDd 3.2 cm    RV Free Wall Peak S' 7.4 cm/s    RVOT Peak Gradient 1 mmHg    RVOT Mean Gradient 1 mmHg    RVOT Peak Velocity 0.6 m/s    RVOT VTI 13.2 cm    LA Diameter 2.8 cm    LA Volume A/L 53 mL    LA Volume A-L A4C 49 18 - 58 mL    LA Volume A-L A4C 51 18 - 58 mL    LA Volume MOD A2C 47 18 - 58 mL    LA Volume MOD A4C 48 18 - 58 mL    LA Volume BP 49 18 - 58 mL    AV Area by Peak Velocity 2.5 cm2    AV Area by VTI 2.6 cm2    AV Peak Gradient 8 mmHg    AV Mean Gradient 5 mmHg    AV Peak Velocity 1.4 m/s    AV Mean Velocity 1.0 m/s    AV VTI 32.7 cm    MV A Velocity 1.01 m/s    MV E Wave Deceleration Time 297.7 ms    MV E Velocity 0.58 m/s    LV E' Lateral Velocity 8.74 cm/s    LV E' Septal Velocity 5.65 cm/s    MR Peak Gradient 85 mmHg    MR Peak Velocity 4.6 m/s    Pulmonary Artery EDP 7 mmHg    LA Max Velocity 1.3 m/s    PV Peak Gradient 3 mmHg    PV Max Velocity 0.8 m/s    TAPSE 2.4 >=1.7 cm    TR Peak Gradient 20 mmHg    TR Max Velocity 2.22 m/s    Ascending Aorta 3.1 cm    Body Surface Area 1.7 m2    Fractional Shortening 2D 29 28 - 44 %    LV ESV Index BP 25 mL/m2    LV EDV Index BP 54 mL/m2    LV ESV Index A4C 27 mL/m2    LV EDV Index A4C 56 mL/m2    LV ESV Index A2C 23 mL/m2    LV EDV Index A2C 49 mL/m2    LVIDd Index 3.22 cm/m2

## 2025-07-31 NOTE — CONSULTS
Yolande Brown PA-C                       [phone] office             Monday-Friday 8:00 am-4:30 pm  I am not permitted to use \"perfect serve\" use above for contact, thanks.      Gastroenterology Consultation Note      Admit Date: 2025  Consult Date: 2025   I greatly appreciate your asking me to see Gilberto Noe Jr, thank you very much for the opportunity to participate in his care.    Narrative Assessment and Plan   The patient is an 85-year-old male with past medical history of CVA, CAD with pacemaker, HTN, anxiety who was consulted by GI for intractable diarrhea and previously seen in office with recent endoscopies/labs workup.  He has a history of chronic diarrhea for the past 3-5 months, that has progressively worsened in frequency recently.  Was just seen in the office 2025, with concerns of celiac disease and/or microscopic colitis.  He tried budesonide for 10 days with no response.  Currently scheduled for EGD/colonoscopy in September, will try to get endoscopies completed while inpatient due to history of syncope and ongoing fatigue/weight loss. The patient C/O of fatigue, 10-15 pound weight loss in the past 3 months, and 5-10 watery/loose bowel movements daily with nocturnal BMs.  Awaiting enteric back panel, O&P, C. difficile, calprotectin.  CBC and CMP unremarkable.  CT abdomen pelvis no acute abnormalities.     Extensive history of colon polyps. Family history colon cancer (mother,  at age 86).     Impression:  Chronic diarrhea - r/o infectious cause. Known history of inflammatory component with elevated fecal calprotectin.    Lightheadedness/fatigue  Weight loss  Dysphagia to thin liquids only    Plan:  Scheduled EGD/colonoscopy with biopsies for tomorrow at 1500; MiraLAX prep, clears during the day and n.p.o. after midnight. Hold Lovenox and aspirin after today's dose.   Obtain cardiac

## 2025-07-31 NOTE — PROGRESS NOTES
Hospitalist Progress Note      NAME:  Gilberto Noe Jr   :  1940  MRM:  [id number]    Date/Time: 2025  7:37 AM           Assessment / Plan:     Gilberto Noe Jr is a 85 y.o. male with prior CVA, CAD, BPH, and anxiety disorder NOS.  He was admitted to this hospital on 2025 for evaluation/management of intractable diarrhea with weight loss.    #Intractable diarrhea with weight loss: Reason for admission.  Ongoing.  Diarrhea primarily happens after he eats, but he still does have some diarrhea when fasting.  No bleeding or fevers, doubt bacterial infection.  No recent antibiotics, doubt C. difficile.  CT abdomen/pelvis on admission showed no acute abnormality.  -Appreciate GI consult  -EGD and colonoscopy tomorrow  -MiraLAX prep  -Clear liquid diet today, n.p.o. after midnight  -Hold further enoxaparin and aspirin  -Neuroendocrine tumor labs ordered by GI, not yet collected    #Prior CVA  -Hold home aspirin in preparation for endoscopy  -Continue atorvastatin    #CAD  -Continue atorvastatin, hold aspirin as above    #BPH  -Continue home tadalafil    #Anxiety disorder NOS  -Continue home alprazolam.  I reviewed PDMP and confirmed that he is prescribed this as an outpatient      I have personally reviewed the radiographs, laboratory data in Epic and decisions and statements above are based partially on this personal interpretation.                 Care Plan discussed with: Patient, Care Manager, and Nursing Staff    Discussed:  Care Plan and D/C Planning    Prophylaxis:  Holding in preparation for endoscopy    Disposition:  Home w/Family           ___________________________________________________    Attending Physician: Dom Bautista MD        Subjective:     Chief Complaint: Diarrhea    Gilberto reports 3 months of diarrhea, though he has not had any bowel movements since arriving to the hospital.  No blood in the bowel movements.  He reports unintentional loss of 10 to 15 pounds over

## 2025-07-31 NOTE — PROGRESS NOTES
Speech LAnguage Pathology EVALUATION/DISCHARGE    Patient: Gilberto Noe Jr (85 y.o. male)  Date: 7/31/2025  Primary Diagnosis: Lightheadedness [R42]  Intractable diarrhea [R19.7]  Diarrhea, unspecified type [R19.7]  Procedure(s) (LRB):  ESOPHAGOGASTRODUODENOSCOPY (N/A)  COLONOSCOPY DIAGNOSTIC (N/A)     Precautions:          aspiration            ASSESSMENT :  Patient with occasional coughing with thins. He was seen last year with the same problem and small bore straw was recommended. He coughed today when using large bore straw.  Recommendation for take small sips with small bore straw remains. No recent h/o PNA.   He was admitted 7-30-25 with weight loss and chronic diarrhea for months. EGD and colonoscopy planned for tomorrow.    PMH: CVA with L weakness,  arthritis, anxiety,  CAD, HTN, BPH.       Patient will be discharged from skilled speech-language pathology services at this time.     PLAN :  Recommendations and Planned Interventions:  Diet: GI has him on clears for now for prep.   Ok for regular diet after EGD/colonoscopy  Upright for all PO  Small sips via small bore straw.        Acute SLP Services: No, patient will be discharged from acute skilled speech-language pathology at this time.  Discharge Recommendations: No, additional SLP treatment not indicated at discharge     SUBJECTIVE:   Patient's wife present as well..”    OBJECTIVE:     Past Medical History:   Diagnosis Date    Anxiety     Arthritis     BPH (benign prostatic hyperplasia)     Cerebral artery occlusion with cerebral infarction (HCC) 2020    Chronic pain     Coronary artery disease 1988    GERD (gastroesophageal reflux disease)     HTN (hypertension), benign     Retinal tear, left     Rotator cuff tear     RIGHT      Past Surgical History:   Procedure Laterality Date    ADENOIDECTOMY  1955    APPENDECTOMY  1955    COLONOSCOPY      COLONOSCOPY N/A 9/25/2024    COLONOSCOPY DIAGNOSTIC performed by Andrew Pardo MD at [facility] ENDOSCOPY

## 2025-08-01 ENCOUNTER — ANESTHESIA (OUTPATIENT)
Facility: HOSPITAL | Age: 85
End: 2025-08-01
Payer: MEDICARE

## 2025-08-01 ENCOUNTER — ANESTHESIA EVENT (OUTPATIENT)
Facility: HOSPITAL | Age: 85
End: 2025-08-01
Payer: MEDICARE

## 2025-08-01 PROCEDURE — 0DB98ZX EXCISION OF DUODENUM, VIA NATURAL OR ARTIFICIAL OPENING ENDOSCOPIC, DIAGNOSTIC: ICD-10-PCS | Performed by: INTERNAL MEDICINE

## 2025-08-01 PROCEDURE — 86316 IMMUNOASSAY TUMOR OTHER: CPT

## 2025-08-01 PROCEDURE — 2709999900 HC NON-CHARGEABLE SUPPLY: Performed by: INTERNAL MEDICINE

## 2025-08-01 PROCEDURE — 88305 TISSUE EXAM BY PATHOLOGIST: CPT

## 2025-08-01 PROCEDURE — 88312 SPECIAL STAINS GROUP 1: CPT

## 2025-08-01 PROCEDURE — 0DBL8ZX EXCISION OF TRANSVERSE COLON, VIA NATURAL OR ARTIFICIAL OPENING ENDOSCOPIC, DIAGNOSTIC: ICD-10-PCS | Performed by: INTERNAL MEDICINE

## 2025-08-01 PROCEDURE — 0DB68ZX EXCISION OF STOMACH, VIA NATURAL OR ARTIFICIAL OPENING ENDOSCOPIC, DIAGNOSTIC: ICD-10-PCS | Performed by: INTERNAL MEDICINE

## 2025-08-01 PROCEDURE — 82943 ASSAY OF GLUCAGON: CPT

## 2025-08-01 PROCEDURE — 82941 ASSAY OF GASTRIN: CPT

## 2025-08-01 PROCEDURE — 6370000000 HC RX 637 (ALT 250 FOR IP): Performed by: NURSE PRACTITIONER

## 2025-08-01 PROCEDURE — 3600007502: Performed by: INTERNAL MEDICINE

## 2025-08-01 PROCEDURE — 6370000000 HC RX 637 (ALT 250 FOR IP): Performed by: INTERNAL MEDICINE

## 2025-08-01 PROCEDURE — 2500000003 HC RX 250 WO HCPCS: Performed by: INTERNAL MEDICINE

## 2025-08-01 PROCEDURE — 7100000011 HC PHASE II RECOVERY - ADDTL 15 MIN: Performed by: INTERNAL MEDICINE

## 2025-08-01 PROCEDURE — 0DBE8ZX EXCISION OF LARGE INTESTINE, VIA NATURAL OR ARTIFICIAL OPENING ENDOSCOPIC, DIAGNOSTIC: ICD-10-PCS | Performed by: INTERNAL MEDICINE

## 2025-08-01 PROCEDURE — 6360000002 HC RX W HCPCS: Performed by: NURSE ANESTHETIST, CERTIFIED REGISTERED

## 2025-08-01 PROCEDURE — 2580000003 HC RX 258: Performed by: NURSE ANESTHETIST, CERTIFIED REGISTERED

## 2025-08-01 PROCEDURE — 3600007512: Performed by: INTERNAL MEDICINE

## 2025-08-01 PROCEDURE — 0DB58ZX EXCISION OF ESOPHAGUS, VIA NATURAL OR ARTIFICIAL OPENING ENDOSCOPIC, DIAGNOSTIC: ICD-10-PCS | Performed by: INTERNAL MEDICINE

## 2025-08-01 PROCEDURE — 1100000000 HC RM PRIVATE

## 2025-08-01 PROCEDURE — 3700000001 HC ADD 15 MINUTES (ANESTHESIA): Performed by: INTERNAL MEDICINE

## 2025-08-01 PROCEDURE — 7100000010 HC PHASE II RECOVERY - FIRST 15 MIN: Performed by: INTERNAL MEDICINE

## 2025-08-01 PROCEDURE — 83497 ASSAY OF 5-HIAA: CPT

## 2025-08-01 PROCEDURE — 3700000000 HC ANESTHESIA ATTENDED CARE: Performed by: INTERNAL MEDICINE

## 2025-08-01 RX ORDER — PROPOFOL 10 MG/ML
INJECTION, EMULSION INTRAVENOUS
Status: DISCONTINUED | OUTPATIENT
Start: 2025-08-01 | End: 2025-08-01 | Stop reason: SDUPTHER

## 2025-08-01 RX ORDER — GLYCOPYRROLATE 0.2 MG/ML
INJECTION INTRAMUSCULAR; INTRAVENOUS
Status: DISCONTINUED | OUTPATIENT
Start: 2025-08-01 | End: 2025-08-01 | Stop reason: SDUPTHER

## 2025-08-01 RX ORDER — SODIUM CHLORIDE 9 MG/ML
INJECTION, SOLUTION INTRAVENOUS
Status: DISCONTINUED | OUTPATIENT
Start: 2025-08-01 | End: 2025-08-01 | Stop reason: SDUPTHER

## 2025-08-01 RX ORDER — LIDOCAINE HYDROCHLORIDE 20 MG/ML
INJECTION, SOLUTION EPIDURAL; INFILTRATION; INTRACAUDAL; PERINEURAL
Status: DISCONTINUED | OUTPATIENT
Start: 2025-08-01 | End: 2025-08-01 | Stop reason: SDUPTHER

## 2025-08-01 RX ORDER — SODIUM CHLORIDE 9 MG/ML
INJECTION, SOLUTION INTRAVENOUS PRN
Status: DISCONTINUED | OUTPATIENT
Start: 2025-08-01 | End: 2025-08-01 | Stop reason: HOSPADM

## 2025-08-01 RX ADMIN — PROPOFOL 10 MG: 10 INJECTION, EMULSION INTRAVENOUS at 15:36

## 2025-08-01 RX ADMIN — PROPOFOL 10 MG: 10 INJECTION, EMULSION INTRAVENOUS at 15:25

## 2025-08-01 RX ADMIN — SODIUM CHLORIDE, PRESERVATIVE FREE 5 ML: 5 INJECTION INTRAVENOUS at 20:00

## 2025-08-01 RX ADMIN — PROPOFOL 10 MG: 10 INJECTION, EMULSION INTRAVENOUS at 15:33

## 2025-08-01 RX ADMIN — PROPOFOL 10 MG: 10 INJECTION, EMULSION INTRAVENOUS at 15:23

## 2025-08-01 RX ADMIN — PROPOFOL 10 MG: 10 INJECTION, EMULSION INTRAVENOUS at 15:20

## 2025-08-01 RX ADMIN — PROPOFOL 10 MG: 10 INJECTION, EMULSION INTRAVENOUS at 15:22

## 2025-08-01 RX ADMIN — PROPOFOL 30 MG: 10 INJECTION, EMULSION INTRAVENOUS at 15:19

## 2025-08-01 RX ADMIN — GLYCOPYRROLATE 0.1 MG: 0.2 INJECTION INTRAMUSCULAR; INTRAVENOUS at 15:13

## 2025-08-01 RX ADMIN — PROPOFOL 10 MG: 10 INJECTION, EMULSION INTRAVENOUS at 15:24

## 2025-08-01 RX ADMIN — DONEPEZIL HYDROCHLORIDE 5 MG: 5 TABLET, FILM COATED ORAL at 20:00

## 2025-08-01 RX ADMIN — ATORVASTATIN CALCIUM 40 MG: 20 TABLET, FILM COATED ORAL at 20:00

## 2025-08-01 RX ADMIN — PROPOFOL 10 MG: 10 INJECTION, EMULSION INTRAVENOUS at 15:26

## 2025-08-01 RX ADMIN — SODIUM CHLORIDE: 900 INJECTION, SOLUTION INTRAVENOUS at 15:13

## 2025-08-01 RX ADMIN — PROPOFOL 10 MG: 10 INJECTION, EMULSION INTRAVENOUS at 15:21

## 2025-08-01 RX ADMIN — PROPOFOL 10 MG: 10 INJECTION, EMULSION INTRAVENOUS at 15:29

## 2025-08-01 RX ADMIN — PROPOFOL 10 MG: 10 INJECTION, EMULSION INTRAVENOUS at 15:27

## 2025-08-01 RX ADMIN — SODIUM CHLORIDE, PRESERVATIVE FREE 10 ML: 5 INJECTION INTRAVENOUS at 11:51

## 2025-08-01 RX ADMIN — PROPOFOL 10 MG: 10 INJECTION, EMULSION INTRAVENOUS at 15:31

## 2025-08-01 RX ADMIN — TRAZODONE HYDROCHLORIDE 50 MG: 50 TABLET ORAL at 20:00

## 2025-08-01 RX ADMIN — LIDOCAINE HYDROCHLORIDE 60 MG: 20 INJECTION, SOLUTION EPIDURAL; INFILTRATION; INTRACAUDAL; PERINEURAL at 15:15

## 2025-08-01 ASSESSMENT — PAIN SCALES - GENERAL
PAINLEVEL_OUTOF10: 0

## 2025-08-01 ASSESSMENT — PAIN - FUNCTIONAL ASSESSMENT: PAIN_FUNCTIONAL_ASSESSMENT: NONE - DENIES PAIN

## 2025-08-01 NOTE — PROGRESS NOTES
Report from Diogo ESPAÑA see anesthesia record. Abdomen remains soft, non-tender; pt denies pain. Scope pre-cleaned at bedside by Jani Jang. Report given to Marguerite RN @3190 in Recovery.     TRANSFER - OUT REPORT:    Verbal report given to Jimena RN @ 6293 on Gilberto Noe Jr  being transferred to 5th floor for routine progression of patient care       Report consisted of patient's Situation, Background, Assessment and   Recommendations(SBAR).     Information from the following report(s) endoscopy report was reviewed with the receiving nurse.           Lines:   Peripheral IV 08/01/25 Right Antecubital (Active)   Site Assessment Clean, dry & intact 08/01/25 1558   Line Status Capped 08/01/25 1558   Line Care Cap changed;Connections checked and tightened 08/01/25 1558   Phlebitis Assessment No symptoms 08/01/25 1558   Infiltration Assessment 0 08/01/25 1558   Alcohol Cap Used Yes 08/01/25 1558   Dressing Status Clean, dry & intact 08/01/25 1558   Dressing Type Transparent 08/01/25 1558        Opportunity for questions and clarification was provided.      Patient transported with:  Registered Nurse

## 2025-08-01 NOTE — ANESTHESIA POSTPROCEDURE EVALUATION
Department of Anesthesiology  Postprocedure Note    Patient: Gilberto Noe Jr  MRN: [id number]  YOB: 1940  Date of evaluation: 8/1/2025    Procedure Summary       Date: 08/01/25 Room / Location: [place] [address] / [facility] ENDOSCOPY    Anesthesia Start: 1513 Anesthesia Stop: 1543    Procedures:       ESOPHAGOGASTRODUODENOSCOPY (Upper GI Region)      COLONOSCOPY DIAGNOSTIC (Lower GI Region)      ESOPHAGOGASTRODUODENOSCOPY BIOPSY (Upper GI Region)      COLONOSCOPY BIOPSY (Lower GI Region)      COLONOSCOPY POLYPECTOMY SNARE/BIOPSY (Lower GI Region) Diagnosis:       Diarrhea, unspecified type      (Diarrhea, unspecified type [R19.7])    Surgeons: Chencho Oakley MD Responsible Provider: Frantz Ballard MD    Anesthesia Type: MAC ASA Status: 3            Anesthesia Type: MAC    Maegan Phase I: Maegan Score: 10    Maegan Phase II:      Anesthesia Post Evaluation    Patient location during evaluation: PACU  Patient participation: complete - patient participated  Level of consciousness: awake  Pain score: 0  Airway patency: patent  Nausea & Vomiting: no nausea and no vomiting  Cardiovascular status: blood pressure returned to baseline  Respiratory status: acceptable  Hydration status: euvolemic  Pain management: adequate    No notable events documented.

## 2025-08-01 NOTE — OP NOTE
[facility]  [facility]  Chencho Oakley MD  [phone]      2025    Esophagogastroduodenoscopy & Colonoscopy Procedure Note  Gilberto Noe Jr  : 1940  [facility] Medical Record Number: [id number]      Indications:   EGD: Unintentional weight loss, diarrhea, exclusion of celiac disease.  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    Referring Physician:  Noel Esparza MD  Anesthesia/Sedation: Conscious Sedation/Moderate Sedation/MAC  Endoscopist:  Dr. Chencho Oakley  Complications:  None  Estimated Blood Loss:  None    Permit:  The indications, risks, benefits and alternatives were reviewed with the patient or their decision maker who was provided an opportunity to ask questions and all questions were answered.  The specific risks of esophagogastroduodenoscopy with conscious sedation were reviewed, including but not limited to anesthetic complication, bleeding, adverse drug reaction, missed lesion, infection, IV site reactions, and intestinal perforation which would lead to the need for surgical repair.  Alternatives to EGD and colonoscopy including radiographic imaging, observation without testing, or laboratory testing were reviewed as well as the limitations of those alternatives discussed.  After considering the options and having all their questions answered, the patient or their decision maker provided both verbal and written consent to proceed.      -----------EGD------------   Procedure in Detail:  After obtaining informed consent, positioning of the patient in the left lateral decubitus position, and conduction of a pre-procedure pause or \"time out\" the endoscope was introduced into the mouth and advanced to the duodenum.  A careful inspection was made, and findings or interventions are described below.    Findings:

## 2025-08-01 NOTE — ANESTHESIA PRE PROCEDURE
Department of Anesthesiology  Preprocedure Note       Name:  Gilberto Noe Jr   Age:  85 y.o.  :  1940                                          MRN:  [id number]         Date:  2025      Surgeon: Surgeon(s):  Chencho Oakley MD    Procedure: Procedure(s):  ESOPHAGOGASTRODUODENOSCOPY  COLONOSCOPY DIAGNOSTIC    Medications prior to admission:   Prior to Admission medications    Medication Sig Start Date End Date Taking? Authorizing Provider   donepezil (ARICEPT) 5 MG tablet Take 1 tablet by mouth nightly   Yes Bela Otoole MD   fluticasone (FLONASE) 50 MCG/ACT nasal spray  24  Yes Bela Otoole MD   aspirin 81 MG EC tablet Take 1 tablet by mouth daily   Yes Bela Otoole MD   tadalafil (CIALIS) 5 MG tablet Take 1 tablet by mouth every morning   Yes Bela tOoole MD   traZODone (DESYREL) 50 MG tablet Take 1 tablet by mouth nightly   Yes Bela Otoole MD   ALPRAZolam (XANAX) 0.5 MG tablet Take 0.5 tablets by mouth 3 times daily as needed for Anxiety. 20  Yes Automatic Reconciliation, Ar   atorvastatin (LIPITOR) 40 MG tablet Take 1 tablet by mouth nightly   Yes Automatic Reconciliation, Ar   testosterone cypionate (DEPOTESTOTERONE CYPIONATE) 200 MG/ML injection Inject 1 mL into the muscle every 14 days.   Yes Automatic Reconciliation, Ar   ofloxacin (FLOXIN) 0.3 % otic solution 1 drop into Left Eye Ophthalmic Four times a day for 10 days  Patient not taking: Reported on 2025   Bela Otoole MD   UNKNOWN TO PATIENT Uses a nasal spray. Will bring information.    Bela Otoole MD   NONFORMULARY Takes a salt pill po once daily.  Patient not taking: Reported on 2024    Bela Otoole MD   Naproxen Sodium (ALEVE PO) Take by mouth Takes one po daily.  Patient not taking: Reported on 2025    Bela Otoole MD   gabapentin (NEURONTIN) 100 MG capsule Take 1 capsule by mouth 3 times daily.  Patient not taking: Reported

## 2025-08-01 NOTE — PROGRESS NOTES
TRANSFER - IN REPORT:    Verbal report received from ROB Hess on Gilberto Noe Jr  being received from [phone] for ordered procedure      Report consisted of patient's Situation, Background, Assessment and   Recommendations(SBAR).     Information from the following report(s) Nurse Handoff Report, ED Encounter Summary, Adult Overview, MAR, and Recent Results was reviewed with the receiving nurse.    Opportunity for questions and clarification was provided.      Assessment completed upon patient's arrival to unit and care assumed.

## 2025-08-01 NOTE — PROGRESS NOTES
GI note.  Endoscopy results reviewed.  Please call if we can assist further, for now GI will sign off.  The patient should be advised to follow up with his usual gastroenterologist, Dr. KIM Pardo after discharge.   Frantz Clifford MD

## 2025-08-01 NOTE — CARE COORDINATION
Care Management Progress Note    Reason for Admission:   Lightheadedness [R42]  Intractable diarrhea [R19.7]  Diarrhea, unspecified type [R19.7]  Procedure(s) (LRB):  ESOPHAGOGASTRODUODENOSCOPY (N/A)  COLONOSCOPY DIAGNOSTIC (N/A)       Patient Admission Status: Inpatient  RUR: 10%  Hospitalization in the last 30 days (Readmission):  No        CM reviewed EMR for clinical updates.    Transition Plan of Care:  Cardiology, GI following for medical management - plan for EGD/colonoscopy on 8/1  PT/OT/ST evals complete; pt ambulated 50 feet on 7/31 and no further rehab therapies are indicated.  Plan is for pt to return home   Outpatient follow up  Spouse will transport pt home    CM will continue to follow pt until discharged.  Ally

## 2025-08-01 NOTE — PROGRESS NOTES
Hospitalist Progress Note      NAME:  Gilberto Noe Jr   :  1940  MRM:  [id number]    Date/Time: 2025  7:55 AM           Assessment / Plan:     Gilberto Noe Jr is a 85 y.o. male with prior CVA, CAD, BPH, and anxiety disorder NOS.  He was admitted to this hospital on 2025 for evaluation/management of intractable diarrhea with weight loss.    #Intractable diarrhea with weight loss: Reason for admission.  Ongoing. Diarrhea primarily happens after he eats, but he still does have some diarrhea when fasting.  No bleeding or fevers, doubt bacterial infection.  No recent antibiotics, doubt C. difficile.  Possible microscopic colitis.  Neuroendocrine tumor is on the differential as well.  CT abdomen/pelvis on admission showed no acute abnormality.  -Appreciate GI consult  -EGD and colonoscopy today  -N.p.o. for procedure today  -Hold further enoxaparin and aspirin until after endoscopy--resume tomorrow  -Neuroendocrine tumor labs pending    #Prior CVA  -Hold home aspirin in preparation for endoscopy, resume tomorrow  -Continue atorvastatin    #CAD  -Continue atorvastatin, hold aspirin as above    #BPH  -Continue home tadalafil    #Anxiety disorder NOS  -Continue home alprazolam.  I reviewed PDMP and confirmed that he is prescribed this as an outpatient      I have personally reviewed the radiographs, laboratory data in Epic and decisions and statements above are based partially on this personal interpretation.                 Care Plan discussed with: Patient, Care Manager, and Nursing Staff    Discussed:  Care Plan and D/C Planning    Prophylaxis:  Holding in preparation for endoscopy    Disposition:  Home w/Family           ___________________________________________________    Attending Physician: Dom Bautista MD        Subjective:     Chief Complaint: Diarrhea    Ongoing diarrhea overnight, though it is not as bad when he is fasting.  Consistency is very mildly improving.  He has not  day    sodium chloride flush 0.9 % injection 5-40 mL  5-40 mL IntraVENous PRN    0.9 % sodium chloride infusion   IntraVENous PRN    potassium chloride (KLOR-CON) extended release tablet 40 mEq  40 mEq Oral PRN    Or    potassium bicarb-citric acid (EFFER-K) effervescent tablet 40 mEq  40 mEq Oral PRN    Or    potassium chloride 10 mEq/100 mL IVPB (Peripheral Line)  10 mEq IntraVENous PRN    magnesium sulfate 2000 mg in 50 mL IVPB premix  2,000 mg IntraVENous PRN    [Held by provider] enoxaparin (LOVENOX) injection 40 mg  40 mg SubCUTAneous Daily    ondansetron (ZOFRAN-ODT) disintegrating tablet 4 mg  4 mg Oral Q8H PRN    Or    ondansetron (ZOFRAN) injection 4 mg  4 mg IntraVENous Q6H PRN    polyethylene glycol (GLYCOLAX) packet 17 g  17 g Oral Daily PRN    acetaminophen (TYLENOL) tablet 650 mg  650 mg Oral Q6H PRN    Or    acetaminophen (TYLENOL) suppository 650 mg  650 mg Rectal Q6H PRN    [Held by provider] aspirin EC tablet 81 mg  81 mg Oral Daily    atorvastatin (LIPITOR) tablet 40 mg  40 mg Oral Nightly    tadalafil (CIALIS) tablet 5 mg (Patient Supplied)  5 mg Oral QAM            Lab Review:     Recent Labs     07/30/25  1413   WBC 8.3   HGB 14.8   HCT 44.3        Recent Labs     07/30/25  1413 07/31/25  0750    140   K 4.1 4.2    104   CO2 24 25   BUN 15 15   MG 2.1  --    ALT 27  --      No components found for: \"GLPOC\"

## 2025-08-02 VITALS
TEMPERATURE: 98.2 F | DIASTOLIC BLOOD PRESSURE: 58 MMHG | RESPIRATION RATE: 18 BRPM | HEIGHT: 67 IN | WEIGHT: 135 LBS | SYSTOLIC BLOOD PRESSURE: 119 MMHG | BODY MASS INDEX: 21.19 KG/M2 | OXYGEN SATURATION: 94 % | HEART RATE: 59 BPM

## 2025-08-02 PROBLEM — I63.9 ACUTE ISCHEMIC STROKE (HCC): Status: RESOLVED | Noted: 2024-04-07 | Resolved: 2025-08-02

## 2025-08-02 LAB
ANION GAP SERPL CALC-SCNC: 8 MMOL/L (ref 2–14)
BASOPHILS # BLD: 0.02 K/UL (ref 0–0.1)
BASOPHILS NFR BLD: 0.3 % (ref 0–1)
BUN SERPL-MCNC: 16 MG/DL (ref 8–23)
BUN/CREAT SERPL: 17 (ref 12–20)
CALCIUM SERPL-MCNC: 8.9 MG/DL (ref 8.8–10.2)
CHLORIDE SERPL-SCNC: 99 MMOL/L (ref 98–107)
CO2 SERPL-SCNC: 24 MMOL/L (ref 20–29)
CREAT SERPL-MCNC: 0.93 MG/DL (ref 0.7–1.2)
DIFFERENTIAL METHOD BLD: NORMAL
EOSINOPHIL # BLD: 0.17 K/UL (ref 0–0.4)
EOSINOPHIL NFR BLD: 2.7 % (ref 0–7)
ERYTHROCYTE [DISTWIDTH] IN BLOOD BY AUTOMATED COUNT: 14.2 % (ref 11.5–14.5)
GASTRIN SERPL-MCNC: 59 PG/ML (ref 0–115)
GLUCOSE SERPL-MCNC: 93 MG/DL (ref 65–100)
HCT VFR BLD AUTO: 43.4 % (ref 36.6–50.3)
HGB BLD-MCNC: 14.4 G/DL (ref 12.1–17)
IMM GRANULOCYTES # BLD AUTO: 0.02 K/UL (ref 0–0.04)
IMM GRANULOCYTES NFR BLD AUTO: 0.3 % (ref 0–0.5)
LYMPHOCYTES # BLD: 1.08 K/UL (ref 0.8–3.5)
LYMPHOCYTES NFR BLD: 16.9 % (ref 12–49)
MCH RBC QN AUTO: 29.4 PG (ref 26–34)
MCHC RBC AUTO-ENTMCNC: 33.2 G/DL (ref 30–36.5)
MCV RBC AUTO: 88.6 FL (ref 80–99)
MONOCYTES # BLD: 0.6 K/UL (ref 0–1)
MONOCYTES NFR BLD: 9.4 % (ref 5–13)
NEUTS SEG # BLD: 4.51 K/UL (ref 1.8–8)
NEUTS SEG NFR BLD: 70.4 % (ref 32–75)
NRBC # BLD: 0 K/UL (ref 0–0.01)
NRBC BLD-RTO: 0 PER 100 WBC
PLATELET # BLD AUTO: 170 K/UL (ref 150–400)
PMV BLD AUTO: 8.9 FL (ref 8.9–12.9)
POTASSIUM SERPL-SCNC: 4 MMOL/L (ref 3.5–5.1)
RBC # BLD AUTO: 4.9 M/UL (ref 4.1–5.7)
SODIUM SERPL-SCNC: 132 MMOL/L (ref 136–145)
WBC # BLD AUTO: 6.4 K/UL (ref 4.1–11.1)

## 2025-08-02 PROCEDURE — 2500000003 HC RX 250 WO HCPCS: Performed by: INTERNAL MEDICINE

## 2025-08-02 PROCEDURE — 6370000000 HC RX 637 (ALT 250 FOR IP)

## 2025-08-02 PROCEDURE — 94761 N-INVAS EAR/PLS OXIMETRY MLT: CPT

## 2025-08-02 PROCEDURE — 80048 BASIC METABOLIC PNL TOTAL CA: CPT

## 2025-08-02 PROCEDURE — 85025 COMPLETE CBC W/AUTO DIFF WBC: CPT

## 2025-08-02 PROCEDURE — 6370000000 HC RX 637 (ALT 250 FOR IP): Performed by: HOSPITALIST

## 2025-08-02 RX ORDER — LOPERAMIDE HYDROCHLORIDE 2 MG/1
2 CAPSULE ORAL 4 TIMES DAILY PRN
Status: DISCONTINUED | OUTPATIENT
Start: 2025-08-02 | End: 2025-08-02 | Stop reason: HOSPADM

## 2025-08-02 RX ORDER — LOPERAMIDE HYDROCHLORIDE 2 MG/1
2 CAPSULE ORAL 4 TIMES DAILY PRN
Qty: 60 CAPSULE | Refills: 0 | Status: SHIPPED | OUTPATIENT
Start: 2025-08-02 | End: 2025-08-12

## 2025-08-02 RX ADMIN — ALPRAZOLAM 0.5 MG: 0.5 TABLET ORAL at 08:35

## 2025-08-02 RX ADMIN — SODIUM CHLORIDE, PRESERVATIVE FREE 10 ML: 5 INJECTION INTRAVENOUS at 08:34

## 2025-08-02 RX ADMIN — LOPERAMIDE HYDROCHLORIDE 2 MG: 2 CAPSULE ORAL at 03:05

## 2025-08-02 RX ADMIN — ASPIRIN 81 MG: 81 TABLET, COATED ORAL at 08:34

## 2025-08-02 RX ADMIN — LOPERAMIDE HYDROCHLORIDE 2 MG: 2 CAPSULE ORAL at 11:30

## 2025-08-02 NOTE — PLAN OF CARE
Problem: Chronic Conditions and Co-morbidities  Goal: Patient's chronic conditions and co-morbidity symptoms are monitored and maintained or improved  8/1/2025 2233 by Addie Yates RN  Outcome: Progressing  Flowsheets (Taken 8/1/2025 2021)  Care Plan - Patient's Chronic Conditions and Co-Morbidity Symptoms are Monitored and Maintained or Improved:   Monitor and assess patient's chronic conditions and comorbid symptoms for stability, deterioration, or improvement   Collaborate with multidisciplinary team to address chronic and comorbid conditions and prevent exacerbation or deterioration  8/1/2025 1616 by Jimena Yu RN  Outcome: Progressing     Problem: Pain  Goal: Verbalizes/displays adequate comfort level or baseline comfort level  8/1/2025 2233 by Addie Yates RN  Outcome: Progressing  8/1/2025 1616 by Jimena Yu RN  Outcome: Progressing     Problem: Safety - Adult  Goal: Free from fall injury  8/1/2025 2233 by Addie Yates RN  Outcome: Progressing  8/1/2025 1616 by Jimena Yu RN  Outcome: Progressing

## 2025-08-02 NOTE — DISCHARGE SUMMARY
Hospitalist Discharge Summary     Patient ID:  Gilberto Noe Jr  [id number]  85 y.o.  1940    Admit date: 7/30/2025    Discharge date and time: 8/2/2025    Admission Diagnoses: Lightheadedness [R42]  Intractable diarrhea [R19.7]  Diarrhea, unspecified type [R19.7]    Discharge Diagnoses:    Principal Problem:    Diarrhea  Active Problems:    Hyperlipidemia    Anxiety    HTN (hypertension), benign    BPH (benign prostatic hyperplasia)    Arthritis    Chronic pain  Resolved Problems:    Acute ischemic stroke (HCC)         Hospital Course:   From admission H&P dated 7/30/2025: \"Mr. Hasmukh Teague is a 85 y.o.  male with a past medical history of CVA with left upper extremity weakness, coronary artery disease, hypertension, BPH, arthritis and anxiety who is admitted with intractable diarrhea and lightheadedness.  Mr. Hasmukh Teague has been having diarrhea for the past 3 months however he states that his diarrhea has recently worsened.  He states he is now having 7-8 episodes daily.  He denies any blood in his bowel movements.  He denies any associated abdominal pain or nausea vomiting.  He denies any fevers or chills.  He states anything that anything that he eats seems to go right through him.  He does state that he is having some dysphagia with liquids.  He has been seeing GI outpatient for this and is supposed to have endoscopies on September 4.  He denies any associated chest pain or dyspnea but states that he has had lightheadedness.  Of note, he also states that he had a syncopal episode about 8 weeks ago at which time he injured his right shoulder.  He states that he blacked out in the bathroom and cannot recall much of the event.  He states he has not seen his heart doctor since he had syncope and has not been worked up for this.  He denies any history of seizures.\"    Mr. Noe continues to have diarrhea during his stay here.  He had no further syncopal episodes.  GI was consulted and recommended colonoscopy.

## 2025-08-03 LAB
5-HIAA URINE: NORMAL MG/L
CREAT UR-MCNC: 81.1 MG/DL

## 2025-08-04 LAB — CGA SERPL-SCNC: 210.9 NG/ML (ref 0–101.8)

## 2025-08-05 ENCOUNTER — APPOINTMENT (OUTPATIENT)
Facility: HOSPITAL | Age: 85
DRG: 551 | End: 2025-08-05
Payer: MEDICARE

## 2025-08-05 ENCOUNTER — HOSPITAL ENCOUNTER (INPATIENT)
Facility: HOSPITAL | Age: 85
LOS: 1 days | Discharge: HOME | DRG: 551 | End: 2025-08-06
Attending: STUDENT IN AN ORGANIZED HEALTH CARE EDUCATION/TRAINING PROGRAM | Admitting: INTERNAL MEDICINE
Payer: MEDICARE

## 2025-08-05 DIAGNOSIS — R79.89 ELEVATED D-DIMER: Primary | ICD-10-CM

## 2025-08-05 DIAGNOSIS — R06.89 DYSPNEA AND RESPIRATORY ABNORMALITIES: ICD-10-CM

## 2025-08-05 DIAGNOSIS — R06.00 DYSPNEA AND RESPIRATORY ABNORMALITIES: ICD-10-CM

## 2025-08-05 DIAGNOSIS — R62.7 FAILURE TO THRIVE IN ADULT: ICD-10-CM

## 2025-08-05 DIAGNOSIS — R07.9 CHEST PAIN, UNSPECIFIED TYPE: ICD-10-CM

## 2025-08-05 PROBLEM — S43.101A CLOSED DISLOCATION OF RIGHT ACROMIOCLAVICULAR JOINT: Status: ACTIVE | Noted: 2025-08-05

## 2025-08-05 PROBLEM — M54.2 NECK PAIN: Status: ACTIVE | Noted: 2025-08-05

## 2025-08-05 PROBLEM — Z86.73 HISTORY OF CEREBROVASCULAR ACCIDENT: Status: ACTIVE | Noted: 2025-08-05

## 2025-08-05 PROBLEM — R06.09 DOE (DYSPNEA ON EXERTION): Status: ACTIVE | Noted: 2025-08-05

## 2025-08-05 LAB
5-HIAA URINE: NORMAL MG/L
ALBUMIN SERPL-MCNC: 4.3 G/DL (ref 3.5–5.2)
ALBUMIN/GLOB SERPL: 1.4 (ref 1.1–2.2)
ALP SERPL-CCNC: 90 U/L (ref 40–129)
ALT SERPL-CCNC: 27 U/L (ref 10–50)
ANION GAP SERPL CALC-SCNC: 10 MMOL/L (ref 2–14)
AST SERPL-CCNC: 26 U/L (ref 10–50)
BASOPHILS # BLD: 0.03 K/UL (ref 0–0.1)
BASOPHILS NFR BLD: 0.4 % (ref 0–1)
BILIRUB SERPL-MCNC: 0.4 MG/DL (ref 0–1.2)
BUN SERPL-MCNC: 18 MG/DL (ref 8–23)
BUN/CREAT SERPL: 16 (ref 12–20)
CALCIUM SERPL-MCNC: 10.4 MG/DL (ref 8.8–10.2)
CHLORIDE SERPL-SCNC: 99 MMOL/L (ref 98–107)
CO2 SERPL-SCNC: 31 MMOL/L (ref 20–29)
COMMENT:: NORMAL
CREAT SERPL-MCNC: 1.13 MG/DL (ref 0.7–1.2)
CREAT UR-MCNC: 81.1 MG/DL
D DIMER PPP FEU-MCNC: 1.09 MG/L FEU (ref 0–0.65)
DIFFERENTIAL METHOD BLD: NORMAL
EOSINOPHIL # BLD: 0.36 K/UL (ref 0–0.4)
EOSINOPHIL NFR BLD: 4.4 % (ref 0–7)
ERYTHROCYTE [DISTWIDTH] IN BLOOD BY AUTOMATED COUNT: 14 % (ref 11.5–14.5)
GLOBULIN SER CALC-MCNC: 3.1 G/DL (ref 2–4)
GLUCAGON SERPL-MCNC: 221 PG/ML (ref 13–159)
GLUCOSE SERPL-MCNC: 106 MG/DL (ref 65–100)
HCT VFR BLD AUTO: 47.1 % (ref 36.6–50.3)
HGB BLD-MCNC: 15.9 G/DL (ref 12.1–17)
IMM GRANULOCYTES # BLD AUTO: 0.02 K/UL (ref 0–0.04)
IMM GRANULOCYTES NFR BLD AUTO: 0.2 % (ref 0–0.5)
LYMPHOCYTES # BLD: 1.86 K/UL (ref 0.8–3.5)
LYMPHOCYTES NFR BLD: 23 % (ref 12–49)
MCH RBC QN AUTO: 30.1 PG (ref 26–34)
MCHC RBC AUTO-ENTMCNC: 33.8 G/DL (ref 30–36.5)
MCV RBC AUTO: 89 FL (ref 80–99)
MONOCYTES # BLD: 0.55 K/UL (ref 0–1)
MONOCYTES NFR BLD: 6.8 % (ref 5–13)
NEUTS SEG # BLD: 5.28 K/UL (ref 1.8–8)
NEUTS SEG NFR BLD: 65.2 % (ref 32–75)
NRBC # BLD: 0 K/UL (ref 0–0.01)
NRBC BLD-RTO: 0 PER 100 WBC
PLATELET # BLD AUTO: 191 K/UL (ref 150–400)
PMV BLD AUTO: 9.5 FL (ref 8.9–12.9)
POTASSIUM SERPL-SCNC: 4.1 MMOL/L (ref 3.5–5.1)
PROT SERPL-MCNC: 7.4 G/DL (ref 6.4–8.3)
RBC # BLD AUTO: 5.29 M/UL (ref 4.1–5.7)
SODIUM SERPL-SCNC: 140 MMOL/L (ref 136–145)
SPECIMEN HOLD: NORMAL
TROPONIN T SERPL HS-MCNC: 14.5 NG/L (ref 0–22)
WBC # BLD AUTO: 8.1 K/UL (ref 4.1–11.1)

## 2025-08-05 PROCEDURE — 85379 FIBRIN DEGRADATION QUANT: CPT

## 2025-08-05 PROCEDURE — 71046 X-RAY EXAM CHEST 2 VIEWS: CPT

## 2025-08-05 PROCEDURE — 80053 COMPREHEN METABOLIC PANEL: CPT

## 2025-08-05 PROCEDURE — 2500000003 HC RX 250 WO HCPCS: Performed by: INTERNAL MEDICINE

## 2025-08-05 PROCEDURE — 76705 ECHO EXAM OF ABDOMEN: CPT

## 2025-08-05 PROCEDURE — 6370000000 HC RX 637 (ALT 250 FOR IP): Performed by: INTERNAL MEDICINE

## 2025-08-05 PROCEDURE — 2060000000 HC ICU INTERMEDIATE R&B

## 2025-08-05 PROCEDURE — 36415 COLL VENOUS BLD VENIPUNCTURE: CPT

## 2025-08-05 PROCEDURE — 6360000002 HC RX W HCPCS: Performed by: INTERNAL MEDICINE

## 2025-08-05 PROCEDURE — 99285 EMERGENCY DEPT VISIT HI MDM: CPT

## 2025-08-05 PROCEDURE — 93005 ELECTROCARDIOGRAM TRACING: CPT | Performed by: STUDENT IN AN ORGANIZED HEALTH CARE EDUCATION/TRAINING PROGRAM

## 2025-08-05 PROCEDURE — 3430000000 HC RX DIAGNOSTIC RADIOPHARMACEUTICAL: Performed by: STUDENT IN AN ORGANIZED HEALTH CARE EDUCATION/TRAINING PROGRAM

## 2025-08-05 PROCEDURE — 85025 COMPLETE CBC W/AUTO DIFF WBC: CPT

## 2025-08-05 PROCEDURE — A9540 TC99M MAA: HCPCS | Performed by: STUDENT IN AN ORGANIZED HEALTH CARE EDUCATION/TRAINING PROGRAM

## 2025-08-05 PROCEDURE — 84484 ASSAY OF TROPONIN QUANT: CPT

## 2025-08-05 RX ORDER — SODIUM CHLORIDE 9 MG/ML
INJECTION, SOLUTION INTRAVENOUS PRN
Status: DISCONTINUED | OUTPATIENT
Start: 2025-08-05 | End: 2025-08-06 | Stop reason: HOSPADM

## 2025-08-05 RX ORDER — POTASSIUM CHLORIDE 7.45 MG/ML
10 INJECTION INTRAVENOUS PRN
Status: DISCONTINUED | OUTPATIENT
Start: 2025-08-05 | End: 2025-08-06 | Stop reason: HOSPADM

## 2025-08-05 RX ORDER — POTASSIUM CHLORIDE 750 MG/1
40 TABLET, EXTENDED RELEASE ORAL PRN
Status: DISCONTINUED | OUTPATIENT
Start: 2025-08-05 | End: 2025-08-06 | Stop reason: HOSPADM

## 2025-08-05 RX ORDER — ACETAMINOPHEN 325 MG/1
650 TABLET ORAL EVERY 6 HOURS PRN
Status: DISCONTINUED | OUTPATIENT
Start: 2025-08-05 | End: 2025-08-06 | Stop reason: HOSPADM

## 2025-08-05 RX ORDER — DONEPEZIL HYDROCHLORIDE 5 MG/1
5 TABLET, FILM COATED ORAL NIGHTLY
Status: DISCONTINUED | OUTPATIENT
Start: 2025-08-05 | End: 2025-08-06 | Stop reason: HOSPADM

## 2025-08-05 RX ORDER — DIPHENHYDRAMINE HCL 25 MG
50 CAPSULE ORAL ONCE
Status: COMPLETED | OUTPATIENT
Start: 2025-08-06 | End: 2025-08-06

## 2025-08-05 RX ORDER — SODIUM CHLORIDE 0.9 % (FLUSH) 0.9 %
5-40 SYRINGE (ML) INJECTION PRN
Status: DISCONTINUED | OUTPATIENT
Start: 2025-08-05 | End: 2025-08-06 | Stop reason: HOSPADM

## 2025-08-05 RX ORDER — ACETAMINOPHEN 650 MG/1
650 SUPPOSITORY RECTAL EVERY 6 HOURS PRN
Status: DISCONTINUED | OUTPATIENT
Start: 2025-08-05 | End: 2025-08-06 | Stop reason: HOSPADM

## 2025-08-05 RX ORDER — ATORVASTATIN CALCIUM 20 MG/1
40 TABLET, FILM COATED ORAL NIGHTLY
Status: DISCONTINUED | OUTPATIENT
Start: 2025-08-05 | End: 2025-08-06 | Stop reason: HOSPADM

## 2025-08-05 RX ORDER — ONDANSETRON 2 MG/ML
4 INJECTION INTRAMUSCULAR; INTRAVENOUS ONCE
Status: DISCONTINUED | OUTPATIENT
Start: 2025-08-05 | End: 2025-08-06 | Stop reason: HOSPADM

## 2025-08-05 RX ORDER — TRAZODONE HYDROCHLORIDE 50 MG/1
50 TABLET ORAL NIGHTLY
Status: DISCONTINUED | OUTPATIENT
Start: 2025-08-05 | End: 2025-08-06 | Stop reason: HOSPADM

## 2025-08-05 RX ORDER — ONDANSETRON 2 MG/ML
4 INJECTION INTRAMUSCULAR; INTRAVENOUS EVERY 6 HOURS PRN
Status: DISCONTINUED | OUTPATIENT
Start: 2025-08-05 | End: 2025-08-06 | Stop reason: HOSPADM

## 2025-08-05 RX ORDER — ENOXAPARIN SODIUM 100 MG/ML
1 INJECTION SUBCUTANEOUS 2 TIMES DAILY
Status: DISCONTINUED | OUTPATIENT
Start: 2025-08-05 | End: 2025-08-05

## 2025-08-05 RX ORDER — ENOXAPARIN SODIUM 100 MG/ML
40 INJECTION SUBCUTANEOUS DAILY
Status: DISCONTINUED | OUTPATIENT
Start: 2025-08-06 | End: 2025-08-06 | Stop reason: HOSPADM

## 2025-08-05 RX ORDER — POLYETHYLENE GLYCOL 3350 17 G/17G
17 POWDER, FOR SOLUTION ORAL DAILY PRN
Status: DISCONTINUED | OUTPATIENT
Start: 2025-08-05 | End: 2025-08-06 | Stop reason: HOSPADM

## 2025-08-05 RX ORDER — ALPRAZOLAM 0.25 MG
0.25 TABLET ORAL 3 TIMES DAILY PRN
Status: DISCONTINUED | OUTPATIENT
Start: 2025-08-05 | End: 2025-08-06 | Stop reason: HOSPADM

## 2025-08-05 RX ORDER — ASPIRIN 81 MG/1
81 TABLET ORAL DAILY
Status: DISCONTINUED | OUTPATIENT
Start: 2025-08-06 | End: 2025-08-06 | Stop reason: HOSPADM

## 2025-08-05 RX ORDER — PANTOPRAZOLE SODIUM 40 MG/1
40 TABLET, DELAYED RELEASE ORAL
Status: DISCONTINUED | OUTPATIENT
Start: 2025-08-06 | End: 2025-08-06 | Stop reason: HOSPADM

## 2025-08-05 RX ORDER — MORPHINE SULFATE 2 MG/ML
2 INJECTION, SOLUTION INTRAMUSCULAR; INTRAVENOUS EVERY 4 HOURS PRN
Refills: 0 | Status: DISCONTINUED | OUTPATIENT
Start: 2025-08-05 | End: 2025-08-06 | Stop reason: HOSPADM

## 2025-08-05 RX ORDER — MAGNESIUM SULFATE IN WATER 40 MG/ML
2000 INJECTION, SOLUTION INTRAVENOUS PRN
Status: DISCONTINUED | OUTPATIENT
Start: 2025-08-05 | End: 2025-08-06 | Stop reason: HOSPADM

## 2025-08-05 RX ORDER — SODIUM CHLORIDE 0.9 % (FLUSH) 0.9 %
5-40 SYRINGE (ML) INJECTION EVERY 12 HOURS SCHEDULED
Status: DISCONTINUED | OUTPATIENT
Start: 2025-08-05 | End: 2025-08-06 | Stop reason: HOSPADM

## 2025-08-05 RX ORDER — ONDANSETRON 4 MG/1
4 TABLET, ORALLY DISINTEGRATING ORAL EVERY 8 HOURS PRN
Status: DISCONTINUED | OUTPATIENT
Start: 2025-08-05 | End: 2025-08-06 | Stop reason: HOSPADM

## 2025-08-05 RX ADMIN — TRAZODONE HYDROCHLORIDE 50 MG: 50 TABLET ORAL at 23:10

## 2025-08-05 RX ADMIN — ATORVASTATIN CALCIUM 40 MG: 20 TABLET, FILM COATED ORAL at 23:10

## 2025-08-05 RX ADMIN — PREDNISONE 50 MG: 20 TABLET ORAL at 23:09

## 2025-08-05 RX ADMIN — KIT FOR THE PREPARATION OF TECHNETIUM TC 99M ALBUMIN AGGREGATED 4.4 MILLICURIE: 2.5 INJECTION, POWDER, FOR SOLUTION INTRAVENOUS at 22:53

## 2025-08-05 RX ADMIN — ENOXAPARIN SODIUM 60 MG: 100 INJECTION SUBCUTANEOUS at 23:10

## 2025-08-05 RX ADMIN — MORPHINE SULFATE 2 MG: 2 INJECTION, SOLUTION INTRAMUSCULAR; INTRAVENOUS at 23:54

## 2025-08-05 RX ADMIN — SODIUM CHLORIDE, PRESERVATIVE FREE 10 ML: 5 INJECTION INTRAVENOUS at 23:39

## 2025-08-05 ASSESSMENT — ENCOUNTER SYMPTOMS
VOMITING: 0
WHEEZING: 0
BLOOD IN STOOL: 0
NAUSEA: 1
ALLERGIC/IMMUNOLOGIC NEGATIVE: 1
SHORTNESS OF BREATH: 1
ABDOMINAL PAIN: 0
COUGH: 0
DIARRHEA: 0
CHOKING: 0
STRIDOR: 0
EYES NEGATIVE: 1

## 2025-08-05 ASSESSMENT — PAIN DESCRIPTION - ORIENTATION: ORIENTATION: RIGHT

## 2025-08-05 ASSESSMENT — PAIN - FUNCTIONAL ASSESSMENT: PAIN_FUNCTIONAL_ASSESSMENT: 0-10

## 2025-08-05 ASSESSMENT — PAIN SCALES - GENERAL
PAINLEVEL_OUTOF10: 7
PAINLEVEL_OUTOF10: 8

## 2025-08-05 ASSESSMENT — PAIN DESCRIPTION - LOCATION: LOCATION: SHOULDER

## 2025-08-06 ENCOUNTER — APPOINTMENT (OUTPATIENT)
Facility: HOSPITAL | Age: 85
DRG: 551 | End: 2025-08-06
Payer: MEDICARE

## 2025-08-06 VITALS
OXYGEN SATURATION: 98 % | HEIGHT: 67 IN | TEMPERATURE: 97.5 F | BODY MASS INDEX: 21.19 KG/M2 | SYSTOLIC BLOOD PRESSURE: 144 MMHG | RESPIRATION RATE: 16 BRPM | HEART RATE: 60 BPM | DIASTOLIC BLOOD PRESSURE: 69 MMHG | WEIGHT: 135 LBS

## 2025-08-06 PROBLEM — E43 SEVERE PROTEIN-CALORIE MALNUTRITION: Status: ACTIVE | Noted: 2025-08-06

## 2025-08-06 LAB
BASOPHILS # BLD: 0.02 K/UL (ref 0–0.1)
BASOPHILS NFR BLD: 0.2 % (ref 0–1)
DIFFERENTIAL METHOD BLD: ABNORMAL
EKG ATRIAL RATE: 59 BPM
EKG DIAGNOSIS: NORMAL
EKG Q-T INTERVAL: 416 MS
EKG QRS DURATION: 90 MS
EKG QTC CALCULATION (BAZETT): 416 MS
EKG R AXIS: 49 DEGREES
EKG T AXIS: 43 DEGREES
EKG VENTRICULAR RATE: 60 BPM
EOSINOPHIL # BLD: 0.08 K/UL (ref 0–0.4)
EOSINOPHIL NFR BLD: 1 % (ref 0–7)
ERYTHROCYTE [DISTWIDTH] IN BLOOD BY AUTOMATED COUNT: 13.8 % (ref 11.5–14.5)
HCT VFR BLD AUTO: 45.4 % (ref 36.6–50.3)
HGB BLD-MCNC: 15.2 G/DL (ref 12.1–17)
IMM GRANULOCYTES # BLD AUTO: 0.03 K/UL (ref 0–0.04)
IMM GRANULOCYTES NFR BLD AUTO: 0.4 % (ref 0–0.5)
LYMPHOCYTES # BLD: 0.77 K/UL (ref 0.8–3.5)
LYMPHOCYTES NFR BLD: 9.4 % (ref 12–49)
MCH RBC QN AUTO: 29.9 PG (ref 26–34)
MCHC RBC AUTO-ENTMCNC: 33.5 G/DL (ref 30–36.5)
MCV RBC AUTO: 89.2 FL (ref 80–99)
MONOCYTES # BLD: 0.26 K/UL (ref 0–1)
MONOCYTES NFR BLD: 3.2 % (ref 5–13)
NEUTS SEG # BLD: 7.04 K/UL (ref 1.8–8)
NEUTS SEG NFR BLD: 85.8 % (ref 32–75)
NRBC # BLD: 0 K/UL (ref 0–0.01)
NRBC BLD-RTO: 0 PER 100 WBC
NT PRO BNP: 169 PG/ML (ref 0–450)
PLATELET # BLD AUTO: 153 K/UL (ref 150–400)
PMV BLD AUTO: 9.3 FL (ref 8.9–12.9)
RBC # BLD AUTO: 5.09 M/UL (ref 4.1–5.7)
RBC MORPH BLD: ABNORMAL
WBC # BLD AUTO: 8.2 K/UL (ref 4.1–11.1)

## 2025-08-06 PROCEDURE — 94761 N-INVAS EAR/PLS OXIMETRY MLT: CPT

## 2025-08-06 PROCEDURE — 85025 COMPLETE CBC W/AUTO DIFF WBC: CPT

## 2025-08-06 PROCEDURE — 83880 ASSAY OF NATRIURETIC PEPTIDE: CPT

## 2025-08-06 PROCEDURE — 97116 GAIT TRAINING THERAPY: CPT

## 2025-08-06 PROCEDURE — 6370000000 HC RX 637 (ALT 250 FOR IP): Performed by: INTERNAL MEDICINE

## 2025-08-06 PROCEDURE — 71275 CT ANGIOGRAPHY CHEST: CPT

## 2025-08-06 PROCEDURE — 97161 PT EVAL LOW COMPLEX 20 MIN: CPT

## 2025-08-06 PROCEDURE — 6360000004 HC RX CONTRAST MEDICATION: Performed by: INTERNAL MEDICINE

## 2025-08-06 PROCEDURE — 97165 OT EVAL LOW COMPLEX 30 MIN: CPT

## 2025-08-06 PROCEDURE — 6370000000 HC RX 637 (ALT 250 FOR IP): Performed by: STUDENT IN AN ORGANIZED HEALTH CARE EDUCATION/TRAINING PROGRAM

## 2025-08-06 PROCEDURE — 72125 CT NECK SPINE W/O DYE: CPT

## 2025-08-06 PROCEDURE — 97530 THERAPEUTIC ACTIVITIES: CPT

## 2025-08-06 PROCEDURE — APPSS45 APP SPLIT SHARED TIME 31-45 MINUTES: Performed by: NURSE PRACTITIONER

## 2025-08-06 PROCEDURE — 93010 ELECTROCARDIOGRAM REPORT: CPT | Performed by: INTERNAL MEDICINE

## 2025-08-06 RX ORDER — IOPAMIDOL 755 MG/ML
100 INJECTION, SOLUTION INTRAVASCULAR
Status: COMPLETED | OUTPATIENT
Start: 2025-08-06 | End: 2025-08-06

## 2025-08-06 RX ORDER — PANTOPRAZOLE SODIUM 40 MG/1
40 TABLET, DELAYED RELEASE ORAL
Qty: 30 TABLET | Refills: 3 | Status: SHIPPED | OUTPATIENT
Start: 2025-08-07

## 2025-08-06 RX ORDER — CALCIUM CARBONATE 500 MG/1
500 TABLET, CHEWABLE ORAL 3 TIMES DAILY PRN
Status: DISCONTINUED | OUTPATIENT
Start: 2025-08-06 | End: 2025-08-06 | Stop reason: HOSPADM

## 2025-08-06 RX ADMIN — ANTACID TABLETS 500 MG: 500 TABLET, CHEWABLE ORAL at 08:41

## 2025-08-06 RX ADMIN — PREDNISONE 50 MG: 20 TABLET ORAL at 06:14

## 2025-08-06 RX ADMIN — PANTOPRAZOLE SODIUM 40 MG: 40 TABLET, DELAYED RELEASE ORAL at 06:15

## 2025-08-06 RX ADMIN — DIPHENHYDRAMINE HYDROCHLORIDE 50 MG: 25 CAPSULE ORAL at 11:15

## 2025-08-06 RX ADMIN — ASPIRIN 81 MG: 81 TABLET, COATED ORAL at 08:41

## 2025-08-06 RX ADMIN — ACETAMINOPHEN 650 MG: 325 TABLET ORAL at 03:04

## 2025-08-06 RX ADMIN — IOPAMIDOL 70 ML: 755 INJECTION, SOLUTION INTRAVENOUS at 12:30

## 2025-08-06 RX ADMIN — PREDNISONE 50 MG: 20 TABLET ORAL at 11:15

## 2025-08-06 ASSESSMENT — PAIN SCALES - GENERAL: PAINLEVEL_OUTOF10: 5

## 2025-08-20 NOTE — PROGRESS NOTES
Physician Progress Note      PATIENT:               RADHA SUTTON JR  CSN #:                  [id number]  :                       1940  ADMIT DATE:       2025 2:10 PM  DISCH DATE:        2025 12:28 PM  RESPONDING  PROVIDER #:        Dom Bautista MD          QUERY TEXT:    BPH is documented in the medical record Discharge Summary by Dom Bautista MD at 2025. Please specify the associated urinary conditions:    The clinical indicators include:  This is a 85 y.o. male who is admitted with intractable diarrhea and   lightheadedness.    BPH, HTN    -\" BPH (benign prostatic hyperplasia)- Continue home Cialis \" (H&P by Valerie Vincent DO at 2025)    -\" BPH -Continue home tadalafil \" (Internal Medicine Progress Notes by Dom Bautista MD at 2025)    Tadalafil (CIALIS) tablet 5 mg (EPIC MAR)    Thank youNaomi CDS  Options provided:  -- BPH with partial/complete urinary obstruction  -- Other - I will add my own diagnosis  -- Disagree - Not applicable / Not valid  -- Disagree - Clinically unable to determine / Unknown  -- Refer to Clinical Documentation Reviewer    PROVIDER RESPONSE TEXT:    This patient has BPH with partial/complete urinary obstruction.    Query created by: Naomi Das on 2025 6:13 AM      Electronically signed by:  Dom Bautista MD 2025 2:45 PM

## (undated) DEVICE — 1200 GUARD II KIT W/5MM TUBE W/O VAC TUBE: Brand: GUARDIAN

## (undated) DEVICE — ORCAPOD BUTTONS

## (undated) DEVICE — BLADE OPHTH MINI BEAV SHRP --

## (undated) DEVICE — GLOVE ORANGE PI 7 1/2   MSG9075

## (undated) DEVICE — SINGLE USE AIR/WATER, SUCTION AND BIOPSY VALVES SET: Brand: ORCAPOD™

## (undated) DEVICE — BLADE OPHTH GRN ROUNDED TIP 1 SIDE SHRP GRINDLESS MINI-BLDE

## (undated) DEVICE — WRAP COHESIVE W2INXL5YD TAN SELF ADH BNDG HND NON STERILE TEAR CARING

## (undated) DEVICE — IMPERVIOUS SURGICAL GOWN, LG: Brand: CONVERTORS

## (undated) DEVICE — ELECTRODE,RADIOTRANSLUCENT,FOAM,3PK: Brand: MEDLINE

## (undated) DEVICE — CANNULA CUSH AD W/ 14FT TBG

## (undated) DEVICE — DRESSING,GAUZE,XEROFORM,CURAD,1"X8",ST: Brand: CURAD

## (undated) DEVICE — 4.5 MM HELICUT STRAIGHT BURRS,                                    POWER/EP-1, SLATE, 5000 MAXIMUM RPM,                                    PACKAGED 6 PER BOX, STERILE: Brand: DYONICS HELICUT

## (undated) DEVICE — 4-PORT MANIFOLD: Brand: NEPTUNE 2

## (undated) DEVICE — SHOULDER SUSPENSION KIT 6 PER BOX

## (undated) DEVICE — SUTURE MCRYL SZ 4-0 L18IN ABSRB UD L16MM PC-3 3/8 CIR PRIM Y845G

## (undated) DEVICE — SOLUTION IRRIG 500ML 0.9% SOD CHLO USP POUR PLAS BTL

## (undated) DEVICE — 3M™ STERI-DRAPE™ U-DRAPE 1015: Brand: STERI-DRAPE™

## (undated) DEVICE — GLOVE SURG SZ 65 THK91MIL LTX FREE SYN POLYISOPRENE

## (undated) DEVICE — HAND-SFMCASU: Brand: MEDLINE INDUSTRIES, INC.

## (undated) DEVICE — SPONGE GZ W4XL4IN COT 12 PLY TYP VII WVN C FLD DSGN

## (undated) DEVICE — STERILE POLYISOPRENE POWDER-FREE SURGICAL GLOVES: Brand: PROTEXIS

## (undated) DEVICE — ZIMMER® STERILE DISPOSABLE TOURNIQUET CUFF WITH PROTECTIVE SLEEVE AND PLC, DUAL PORT, SINGLE BLADDER, 18 IN. (46 CM)

## (undated) DEVICE — DRAPE,EXTREMITY,89X128,STERILE: Brand: MEDLINE

## (undated) DEVICE — SET ADMIN 16ML TBNG L100IN 2 Y INJ SITE IV PIGGY BK DISP (ORDER IN MULIPLES OF 48)

## (undated) DEVICE — INFECTION CONTROL KIT SYS

## (undated) DEVICE — BLUNTFILL WITH FILTER: Brand: MONOJECT

## (undated) DEVICE — SNARE ENDOSCP DIA9MM SHTH DIA2.4MM CLD FOR POLYP EXACTO

## (undated) DEVICE — SUT ETHLN 4-0 18IN PS2 BLK --

## (undated) DEVICE — GLOVE ORTHO 7 1/2   MSG9475

## (undated) DEVICE — KIT COLON W/ 1.1OZ LUB AND 2 END

## (undated) DEVICE — BLUNTFILL: Brand: MONOJECT

## (undated) DEVICE — SYRINGE MED 5ML STD CLR PLAS LUERLOCK TIP N CTRL DISP

## (undated) DEVICE — SKIN PREP TRAY W/CHG: Brand: MEDLINE INDUSTRIES, INC.

## (undated) DEVICE — 4.5 MM INCISOR PLUS STRAIGHT                                    BLADES, POWER/EP-1, VIOLET, PACKAGED                                    6 PER BOX, STERILE

## (undated) DEVICE — SUT PROL 5-0 18IN P3 BLU --

## (undated) DEVICE — SUT PLN 5-0 18IN P3 YEL --

## (undated) DEVICE — IV STRT KT 3282] LSL INDUSTRIES INC]

## (undated) DEVICE — DRAPE,REIN 53X77,STERILE: Brand: MEDLINE

## (undated) DEVICE — GARMENT,MEDLINE,DVT,INT,CALF,MED, GEN2: Brand: MEDLINE

## (undated) DEVICE — SUTURE VCRL SZ 0 L27IN ABSRB UD L26MM CT-2 1/2 CIR J270H

## (undated) DEVICE — SUT PROL 4-0 18IN P3 BLU --

## (undated) DEVICE — DYONICS 25 INFLOW/OUTFLOW TUBE                                    SET, SINGLE SUCTION, 3 PER BOX

## (undated) DEVICE — SYRINGE MEDICAL 3ML CLEAR PLASTIC STANDARD NON CONTROL LUERLOCK TIP DISPOSABLE

## (undated) DEVICE — CATHETER IV 22GA L1IN OD0.8382-0.9144MM ID0.6096-0.6858MM 382523

## (undated) DEVICE — ROCKER SWITCH PENCIL BLADE ELECTRODE, HOLSTER: Brand: EDGE

## (undated) DEVICE — ARTHROSCOPY RICHMOND-LF: Brand: MEDLINE INDUSTRIES, INC.

## (undated) DEVICE — CHISEL VULCAN CURVED: Brand: CHISEL

## (undated) DEVICE — TAPE,CLOTH/SILK,CURAD,3"X10YD,LF,40/CS: Brand: CURAD

## (undated) DEVICE — SUTURE TIGERTAPE TIGERWIRE SZ 2-0 L30IN NONABSORBABLE AR72377T

## (undated) DEVICE — SOLUTION IRRIG 3000ML LAC R FLX CONT

## (undated) DEVICE — SUTURE VCRL SZ 2-0 L27IN ABSRB UD L26MM SH 1/2 CIR J417H

## (undated) DEVICE — (D)PREP SKN CHLRAPRP APPL 26ML -- CONVERT TO ITEM 371833

## (undated) DEVICE — SOL IRRIGATION INJ NACL 0.9% 500ML BTL

## (undated) DEVICE — HANDLE LT SNAP ON ULT DURABLE LENS FOR TRUMPF ALC DISPOSABLE

## (undated) DEVICE — SOLUTION IRRIG 1000ML STRL H2O USP PLAS POUR BTL

## (undated) DEVICE — SUTURE PROL SZ 5-0 L18IN NONABSORBABLE BLU L13MM P-3 3/8 8698G

## (undated) DEVICE — APPLICATOR BNDG 1MM ADH PREMIERPRO EXOFIN

## (undated) DEVICE — PAD,ABDOMINAL,5"X9",ST,LF,25/BX: Brand: MEDLINE INDUSTRIES, INC.

## (undated) DEVICE — BNDG ELAS HK LOOP 3X5YD NS -- MATRIX

## (undated) DEVICE — BITEBLOCK ENDOSCP 60FR MAXI WHT POLYETH STURDY W/ VELC WVN

## (undated) DEVICE — SOLUTION IRRIG 1000ML 0.9% SOD CHL USP POUR PLAS BTL

## (undated) DEVICE — SOLIDIFIER FLD 2OZ 1500CC N DISINF IN BTL DISP SAFESORB

## (undated) DEVICE — MAT SUCT W36XL48IN FLD CTRL DISP

## (undated) DEVICE — NDL SUT TAPR PT MAYO 0.5 CIR 5 --